# Patient Record
Sex: MALE | Race: WHITE | NOT HISPANIC OR LATINO | Employment: OTHER | URBAN - METROPOLITAN AREA
[De-identification: names, ages, dates, MRNs, and addresses within clinical notes are randomized per-mention and may not be internally consistent; named-entity substitution may affect disease eponyms.]

---

## 2021-04-23 ENCOUNTER — OFFICE VISIT (OUTPATIENT)
Dept: ORTHOPEDIC SURGERY | Facility: CLINIC | Age: 71
End: 2021-04-23

## 2021-04-23 VITALS
HEART RATE: 69 BPM | SYSTOLIC BLOOD PRESSURE: 170 MMHG | HEIGHT: 67 IN | DIASTOLIC BLOOD PRESSURE: 81 MMHG | WEIGHT: 160 LBS | BODY MASS INDEX: 25.11 KG/M2

## 2021-04-23 DIAGNOSIS — M75.81 TENDINITIS OF RIGHT ROTATOR CUFF: ICD-10-CM

## 2021-04-23 DIAGNOSIS — M75.51 SUBACROMIAL BURSITIS OF RIGHT SHOULDER JOINT: ICD-10-CM

## 2021-04-23 DIAGNOSIS — M25.511 RIGHT SHOULDER PAIN, UNSPECIFIED CHRONICITY: Primary | ICD-10-CM

## 2021-04-23 PROCEDURE — 73030 X-RAY EXAM OF SHOULDER: CPT | Performed by: ORTHOPAEDIC SURGERY

## 2021-04-23 PROCEDURE — 99203 OFFICE O/P NEW LOW 30 MIN: CPT | Performed by: ORTHOPAEDIC SURGERY

## 2021-04-23 RX ORDER — MELOXICAM 7.5 MG/1
7.5 TABLET ORAL DAILY
Qty: 30 TABLET | Refills: 5 | Status: SHIPPED | OUTPATIENT
Start: 2021-04-23 | End: 2021-06-16

## 2021-04-23 RX ORDER — ASPIRIN 81 MG/1
81 TABLET ORAL DAILY
COMMUNITY

## 2021-04-23 RX ORDER — TAMSULOSIN HYDROCHLORIDE 0.4 MG/1
CAPSULE ORAL
COMMUNITY
End: 2021-04-23

## 2021-04-23 RX ORDER — GABAPENTIN 300 MG/1
300 CAPSULE ORAL
COMMUNITY

## 2021-04-23 RX ORDER — HYDROCODONE BITARTRATE AND ACETAMINOPHEN 10; 325 MG/1; MG/1
TABLET ORAL EVERY 8 HOURS SCHEDULED
COMMUNITY
End: 2021-05-21
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
## 2021-04-29 ENCOUNTER — TELEPHONE (OUTPATIENT)
Dept: ORTHOPEDIC SURGERY | Facility: CLINIC | Age: 71
End: 2021-04-29

## 2021-04-29 NOTE — TELEPHONE ENCOUNTER
Caller: PATIENT    Relationship to patient: SELF    Best call back number: [phone]      Type of visit: MRI OF RIGHT SHOULDER      Additional notes: PT. STATES THAT HE HAS BEEN WAITING SINCE LAST Friday TO HAVE MRI OF RIGHT SHOULDER SCHEDULED, BUT HAS NOT HEARD ANYTHING.   PLEASE CALL TO ADVISE.

## 2021-04-29 NOTE — TELEPHONE ENCOUNTER
Called patient. Would like to schedule at [facility]. Transferred him to scheduling dept.

## 2021-05-03 ENCOUNTER — TELEPHONE (OUTPATIENT)
Dept: ORTHOPEDIC SURGERY | Facility: CLINIC | Age: 71
End: 2021-05-03

## 2021-05-03 NOTE — TELEPHONE ENCOUNTER
Caller: SHARON YANCEY    Relationship: SELF    Best call back number: [phone]    What orders are you requesting (i.e. lab or imaging): RT SHOULDER MRI    In what timeframe would the patient need to come in: 5-7-21    Where will you receive your lab/imaging services: POSSIBLY [facility]    Additional notes: PATIENT WOULD LIKE A CALL BACK TO DISCUSS HAVING RT SHOULDER MRI APPT. SET -UP AT [facility], PATIENT STATED THAT HE CAN GET IN THERE ON Friday 5-7-21. PATIENT STATED THAT IF DR. HARDWICK PREFERRED HIM TO GO TO [facility] 5-18-21 THAT HE WOULD KEEP THE APPT THERE.    [facility]: [phone]

## 2021-05-18 ENCOUNTER — HOSPITAL ENCOUNTER (OUTPATIENT)
Dept: MRI IMAGING | Facility: HOSPITAL | Age: 71
Discharge: HOME | End: 2021-05-18
Admitting: ORTHOPAEDIC SURGERY

## 2021-05-18 DIAGNOSIS — M75.81 TENDINITIS OF RIGHT ROTATOR CUFF: ICD-10-CM

## 2021-05-18 DIAGNOSIS — M25.511 RIGHT SHOULDER PAIN, UNSPECIFIED CHRONICITY: ICD-10-CM

## 2021-05-18 DIAGNOSIS — M75.51 SUBACROMIAL BURSITIS OF RIGHT SHOULDER JOINT: ICD-10-CM

## 2021-05-18 PROCEDURE — 73221 MRI JOINT UPR EXTREM W/O DYE: CPT

## 2021-05-21 ENCOUNTER — OFFICE VISIT (OUTPATIENT)
Dept: ORTHOPEDIC SURGERY | Facility: CLINIC | Age: 71
End: 2021-05-21

## 2021-05-21 VITALS — BODY MASS INDEX: 25.11 KG/M2 | HEIGHT: 67 IN | WEIGHT: 160 LBS

## 2021-05-21 DIAGNOSIS — M25.511 RIGHT SHOULDER PAIN, UNSPECIFIED CHRONICITY: Primary | ICD-10-CM

## 2021-05-21 DIAGNOSIS — S46.012D TRAUMATIC COMPLETE TEAR OF LEFT ROTATOR CUFF, SUBSEQUENT ENCOUNTER: ICD-10-CM

## 2021-05-21 PROCEDURE — 99213 OFFICE O/P EST LOW 20 MIN: CPT | Performed by: ORTHOPAEDIC SURGERY

## 2021-05-21 RX ORDER — HYDROCODONE BITARTRATE AND ACETAMINOPHEN 10; 325 MG/1; MG/1
TABLET ORAL 2 TIMES DAILY
COMMUNITY
End: 2021-06-25 | Stop reason: HOSPADM

## 2021-05-21 NOTE — PROGRESS NOTES
Subjective: Right shoulder pain     Patient ID: Mason Angel is a 70 y.o. male.    Chief Complaint:    History of Present Illness patient returns with results of the MRI was images report are personally reviewed shows a complete tear of the supraspinatus tendon of the right shoulder.  Is been in physical therapy for over 6 months which is not helping which is not unexpected.  Persistent having significant pain with limitations.  Patient also today wants to discuss persistent pain he is having his left side as result of a stroke sustained 5 years ago       Social History     Occupational History   • Not on file   Tobacco Use   • Smoking status: Never Smoker   • Smokeless tobacco: Never Used   Vaping Use   • Vaping Use: Never used   Substance and Sexual Activity   • Alcohol use: Yes   • Drug use: Never   • Sexual activity: Defer      Review of Systems   Constitutional: Negative for chills, diaphoresis, fever and unexpected weight change.   HENT: Negative for hearing loss, nosebleeds, sore throat and tinnitus.    Eyes: Negative for pain and visual disturbance.   Respiratory: Negative for cough, shortness of breath and wheezing.    Cardiovascular: Negative for chest pain and palpitations.   Gastrointestinal: Negative for abdominal pain, diarrhea, nausea and vomiting.   Endocrine: Negative for cold intolerance, heat intolerance and polydipsia.   Genitourinary: Negative for difficulty urinating, dysuria and hematuria.   Musculoskeletal: Positive for arthralgias.   Skin: Negative for rash and wound.   Allergic/Immunologic: Negative for environmental allergies.   Neurological: Negative for dizziness, syncope and numbness.   Hematological: Does not bruise/bleed easily.   Psychiatric/Behavioral: Negative for dysphoric mood and sleep disturbance. The patient is not nervous/anxious.    All other systems reviewed and are negative.        No past medical history on file.  No past surgical history on file.  Family History    Problem Relation Age of Onset   • Cancer Mother          Objective:  There were no vitals filed for this visit.      05/21/21  0953   Weight: 72.6 kg (160 lb)     Body mass index is 25.05 kg/m².        Ortho Exam   He is alert oriented x3.  Exam of the right upper extremity there is no motor or sensory deficit but there is pain and weakness with abduction extension at 90 degrees.  Positive Speed test.  Deltoid function is probably 3 out of 5 secondary to the pain.    Assessment:        1. Right shoulder pain, unspecified chronicity    2. Traumatic complete tear of left rotator cuff, subsequent encounter           Plan: Reviewed the results of the MRI with the patient.  Will refer to Dr. Montaño for surgical consultation for repair.  Also discussed with the patient at length residual pain in the left lower extremity as result of his stroke and inquired about possible injections.  He is on Lortab and gabapentin per his primary care physician to control the neurogenic pain and at this point I am not sure any injection to be of any help.  He does have an AFO for his left foot which she states he helps but if he wears it for long periods of time it causes some discomfort.  I did recommend that he contact his primary care physician regarding the ingested possibly the gabapentin to control the pain.  If he like to see a ankle specialist he has seen Dr. Schaefer in the past and I refer him back to Dr. Schaefer but are not sure what if anything else can be done for the ankle.  In any event he will see Dr. Montaño for surgical consultation regarding his right rotator cuff tear.  Answered all            Work Status:    XIAO query complete.    Orders:  No orders of the defined types were placed in this encounter.      Medications:  No orders of the defined types were placed in this encounter.      Followup:  Return if symptoms worsen or fail to improve.          Dictated utilizing Dragon dictation

## 2021-05-25 ENCOUNTER — OFFICE VISIT (OUTPATIENT)
Dept: ORTHOPEDIC SURGERY | Facility: CLINIC | Age: 71
End: 2021-05-25

## 2021-05-25 VITALS — HEIGHT: 67 IN | WEIGHT: 160.05 LBS | BODY MASS INDEX: 25.12 KG/M2

## 2021-05-25 DIAGNOSIS — M75.21 BICEPS TENDINITIS OF RIGHT UPPER EXTREMITY: ICD-10-CM

## 2021-05-25 DIAGNOSIS — M75.121 COMPLETE TEAR OF RIGHT ROTATOR CUFF, UNSPECIFIED WHETHER TRAUMATIC: Primary | ICD-10-CM

## 2021-05-25 DIAGNOSIS — M75.41 SUBACROMIAL IMPINGEMENT OF RIGHT SHOULDER: ICD-10-CM

## 2021-05-25 PROCEDURE — 99214 OFFICE O/P EST MOD 30 MIN: CPT | Performed by: ORTHOPAEDIC SURGERY
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
## 2021-05-26 RX ORDER — MELOXICAM 7.5 MG/1
15 TABLET ORAL ONCE
Status: CANCELLED | OUTPATIENT
Start: 2021-05-26 | End: 2021-05-26

## 2021-05-26 RX ORDER — PREGABALIN 150 MG/1
150 CAPSULE ORAL ONCE
Status: CANCELLED | OUTPATIENT
Start: 2021-05-26 | End: 2021-05-26

## 2021-05-26 RX ORDER — ACETAMINOPHEN 325 MG/1
1000 TABLET ORAL ONCE
Status: CANCELLED | OUTPATIENT
Start: 2021-05-26 | End: 2021-05-26

## 2021-06-01 ENCOUNTER — TRANSCRIBE ORDERS (OUTPATIENT)
Dept: ADMINISTRATIVE | Facility: HOSPITAL | Age: 71
End: 2021-06-01

## 2021-06-01 DIAGNOSIS — U07.1 COVID-19: Primary | ICD-10-CM

## 2021-06-01 PROBLEM — M75.41 SUBACROMIAL IMPINGEMENT OF RIGHT SHOULDER: Status: ACTIVE | Noted: 2021-06-01

## 2021-06-16 ENCOUNTER — PRE-ADMISSION TESTING (OUTPATIENT)
Dept: PREADMISSION TESTING | Facility: HOSPITAL | Age: 71
End: 2021-06-16

## 2021-06-16 VITALS
HEIGHT: 67 IN | DIASTOLIC BLOOD PRESSURE: 76 MMHG | WEIGHT: 160 LBS | BODY MASS INDEX: 25.11 KG/M2 | OXYGEN SATURATION: 96 % | RESPIRATION RATE: 16 BRPM | SYSTOLIC BLOOD PRESSURE: 150 MMHG | HEART RATE: 71 BPM

## 2021-06-16 DIAGNOSIS — M75.21 BICEPS TENDINITIS OF RIGHT UPPER EXTREMITY: ICD-10-CM

## 2021-06-16 DIAGNOSIS — M75.121 COMPLETE TEAR OF RIGHT ROTATOR CUFF, UNSPECIFIED WHETHER TRAUMATIC: ICD-10-CM

## 2021-06-16 DIAGNOSIS — M75.41 SUBACROMIAL IMPINGEMENT OF RIGHT SHOULDER: ICD-10-CM

## 2021-06-16 LAB
ANION GAP SERPL CALCULATED.3IONS-SCNC: 10.5 MMOL/L (ref 5–15)
APTT PPP: 25.8 SECONDS (ref 24.3–38.1)
BASOPHILS # BLD AUTO: 0.03 10*3/MM3 (ref 0–0.2)
BASOPHILS NFR BLD AUTO: 0.5 % (ref 0–1.5)
BUN SERPL-MCNC: 11 MG/DL (ref 8–23)
BUN/CREAT SERPL: 14.9 (ref 7–25)
CALCIUM SPEC-SCNC: 9.1 MG/DL (ref 8.6–10.5)
CHLORIDE SERPL-SCNC: 104 MMOL/L (ref 98–107)
CO2 SERPL-SCNC: 24.5 MMOL/L (ref 22–29)
CREAT SERPL-MCNC: 0.74 MG/DL (ref 0.76–1.27)
DEPRECATED RDW RBC AUTO: 46.9 FL (ref 37–54)
EOSINOPHIL # BLD AUTO: 0.21 10*3/MM3 (ref 0–0.4)
EOSINOPHIL NFR BLD AUTO: 3.2 % (ref 0.3–6.2)
ERYTHROCYTE [DISTWIDTH] IN BLOOD BY AUTOMATED COUNT: 12.3 % (ref 12.3–15.4)
GFR SERPL CREATININE-BSD FRML MDRD: 105 ML/MIN/1.73
GLUCOSE SERPL-MCNC: 115 MG/DL (ref 65–99)
HCT VFR BLD AUTO: 50.6 % (ref 37.5–51)
HGB BLD-MCNC: 16.5 G/DL (ref 13–17.7)
IMM GRANULOCYTES # BLD AUTO: 0.02 10*3/MM3 (ref 0–0.05)
IMM GRANULOCYTES NFR BLD AUTO: 0.3 % (ref 0–0.5)
INR PPP: 1.03 (ref 0.9–1.1)
LYMPHOCYTES # BLD AUTO: 1.27 10*3/MM3 (ref 0.7–3.1)
LYMPHOCYTES NFR BLD AUTO: 19.6 % (ref 19.6–45.3)
MCH RBC QN AUTO: 33.2 PG (ref 26.6–33)
MCHC RBC AUTO-ENTMCNC: 32.6 G/DL (ref 31.5–35.7)
MCV RBC AUTO: 101.8 FL (ref 79–97)
MONOCYTES # BLD AUTO: 0.58 10*3/MM3 (ref 0.1–0.9)
MONOCYTES NFR BLD AUTO: 9 % (ref 5–12)
NEUTROPHILS NFR BLD AUTO: 4.36 10*3/MM3 (ref 1.7–7)
NEUTROPHILS NFR BLD AUTO: 67.4 % (ref 42.7–76)
PLATELET # BLD AUTO: 198 10*3/MM3 (ref 140–450)
PMV BLD AUTO: 10 FL (ref 6–12)
POTASSIUM SERPL-SCNC: 4 MMOL/L (ref 3.5–5.2)
PROTHROMBIN TIME: 13.5 SECONDS (ref 12.1–15)
RBC # BLD AUTO: 4.97 10*6/MM3 (ref 4.14–5.8)
SODIUM SERPL-SCNC: 139 MMOL/L (ref 136–145)
WBC # BLD AUTO: 6.47 10*3/MM3 (ref 3.4–10.8)

## 2021-06-16 PROCEDURE — 85610 PROTHROMBIN TIME: CPT | Performed by: ORTHOPAEDIC SURGERY

## 2021-06-16 PROCEDURE — 85730 THROMBOPLASTIN TIME PARTIAL: CPT | Performed by: ORTHOPAEDIC SURGERY

## 2021-06-16 PROCEDURE — 93005 ELECTROCARDIOGRAM TRACING: CPT

## 2021-06-16 PROCEDURE — 85025 COMPLETE CBC W/AUTO DIFF WBC: CPT | Performed by: ORTHOPAEDIC SURGERY

## 2021-06-16 PROCEDURE — 93010 ELECTROCARDIOGRAM REPORT: CPT | Performed by: INTERNAL MEDICINE

## 2021-06-16 PROCEDURE — 36415 COLL VENOUS BLD VENIPUNCTURE: CPT

## 2021-06-16 PROCEDURE — 80048 BASIC METABOLIC PNL TOTAL CA: CPT | Performed by: ORTHOPAEDIC SURGERY

## 2021-06-16 RX ORDER — FEXOFENADINE HCL 180 MG/1
180 TABLET ORAL DAILY PRN
COMMUNITY

## 2021-06-16 RX ORDER — ATORVASTATIN CALCIUM 40 MG/1
40 TABLET, FILM COATED ORAL NIGHTLY
COMMUNITY

## 2021-06-16 NOTE — DISCHARGE INSTRUCTIONS
PRE-ADMISSION TESTING INSTRUCTIONS FOR ADULTS    Take these medications the morning of surgery with a small sip of water: nothing       No aspirin, advil, aleve, ibuprofen, naproxen, diet pills, decongestants, or herbal/vitamins for a week prior to surgery.    General Instructions:    • Do not eat solid food after midnight the night before surgery.  No gum, mints, or hard candy after midnight the night before surgery.  • You may drink clear liquids the day of surgery up until 2 hours before your arrival time.  • Clear liquids are liquids you can see through. Nothing RED in color.    Plain water    Sports drinks  Sodas     Gelatin (Jell-O)  Fruit juices without pulp such as white grape juice and apple juice  Popsicles that contain no fruit or yogurt  Tea or coffee (no cream or milk added)    • It is beneficial for you to have a clear drink that contains carbohydrates just before you leave your house and before your fasting time begins.  We suggest a 20 ounce bottle of Gatorade or Powerade for non-diabetic patients or a 20 ounce bottle of G2 or Powerade Zero for diabetic patients.     • Patients who avoid smoking, chewing tobacco and alcohol for 4 weeks prior to surgery have a reduced risk of post-operative complications.  If at all possible, quit smoking as many days before surgery as you can.    • Do not smoke, use chewing tobacco or drink alcohol the day of surgery    • Bring your C-PAP/ BI-PAP machine if you use one.  • Wear clean comfortable clothes and socks.  • Do not wear contact lenses, lotion, deodorant, or make-up.  Bring a case for your glasses if applicable. You may brush your teeth the morning of surgery.  • You may wear dentures/partials, do not put adhesive/glue on them.  • Bring crutches or walker if applicable.  • Leave all other jewelry and valuables at home.      Preventing a Surgical Site Infection:    • Shower the night before and on the morning of surgery using the chlorhexidine soap you were  given.  Use a clean washcloth with the soap.  Place clean sheets on your bed after showering the night before surgery. Do not use the CHG soap on your hair, face, or private areas. Wash your body gently for five (5) minutes. Do not scrub your skin.  Dry with a clean towel and dress in clean clothing.    • Do not shave the surgical area for 10 days-2 weeks prior to surgery  because the razor can irritate skin and make it easier to develop an infection.  • Make sure you, your family, and all healthcare providers clean their hands with soap and water or an alcohol based hand  before caring for you or your wound.      Day of surgery:    Your surgeon’s office will advise you of your arrival time for the day of surgery.    Upon arrival, a Pre-op nurse and Anesthesia provider will review your health history, obtain vital signs, and answer questions you may have.  The only belongings needed at this time will be your home medications and if applicable your C-PAP/BI-PAP machine.  If you are staying overnight your family can leave the rest of your belongings in the car and bring them to your room later.  A Pre-op nurse will start an IV and you may receive medication in preparation for surgery, including something to help you relax.  Your family will be able to see you in the Pre-op area.  While you are in surgery your family should notify the waiting room  if they leave the waiting room area and provide a contact phone number.    IF you have any questions, you can call the Pre-Admission Department at [phone] or your surgeon's office.  Notify your surgeon if  you become sick, have a fever, productive cough, or cannot be here the day of surgery    Please be aware that surgery does come with discomfort.  We want to make every effort to control your discomfort so please discuss any uncontrolled symptoms with your nurse.   Your doctor will most likely have prescribed pain medications.      If you are  going home after surgery, you will receive individualized written care instructions before being discharged.  A responsible adult (over the age of 18) must drive you to and from the hospital on the day of your surgery and stay with you for 24 hours after anesthesia.    If you are staying overnight following surgery, you will be transported to your hospital room following the recovery period.  [facility] has all private rooms.    You may receive a survey regarding the care you received. Your feedback is very important and will be used to collect the necessary data to help us to continue to provide excellent care.     Deductibles and co-payments are collected on the day of service. Please be prepared to pay the required co-pay, deductible or deposit on the day of service as defined by your plan.

## 2021-06-16 NOTE — PAT
Pt here for PAT visit.  Pre-op tests completed, chg soap given, and instructions reviewed.  Instructed clears until 2 hrs prior to arrival time, voiced understanding. Covid 6/23. Medical Clearance in Care Everywhere- Dr Sarkar. EDGAR instructions and handouts reviewed with pt

## 2021-06-17 ENCOUNTER — PREP FOR SURGERY (OUTPATIENT)
Dept: OTHER | Facility: HOSPITAL | Age: 71
End: 2021-06-17

## 2021-06-17 DIAGNOSIS — M75.121 COMPLETE TEAR OF RIGHT ROTATOR CUFF, UNSPECIFIED WHETHER TRAUMATIC: Primary | ICD-10-CM

## 2021-06-17 LAB — QT INTERVAL: 362 MS

## 2021-06-17 RX ORDER — CEFAZOLIN SODIUM 2 G/50ML
2 SOLUTION INTRAVENOUS ONCE
Status: CANCELLED | OUTPATIENT
Start: 2021-06-25

## 2021-06-23 ENCOUNTER — LAB (OUTPATIENT)
Dept: LAB | Facility: HOSPITAL | Age: 71
End: 2021-06-23

## 2021-06-23 DIAGNOSIS — U07.1 COVID-19: ICD-10-CM

## 2021-06-23 LAB — SARS-COV-2 RNA PNL SPEC NAA+PROBE: NOT DETECTED

## 2021-06-23 PROCEDURE — C9803 HOPD COVID-19 SPEC COLLECT: HCPCS

## 2021-06-23 PROCEDURE — 87635 SARS-COV-2 COVID-19 AMP PRB: CPT | Performed by: OBSTETRICS & GYNECOLOGY

## 2021-06-24 ENCOUNTER — ANESTHESIA EVENT (OUTPATIENT)
Dept: PERIOP | Facility: HOSPITAL | Age: 71
End: 2021-06-24

## 2021-06-25 ENCOUNTER — HOSPITAL ENCOUNTER (OUTPATIENT)
Facility: HOSPITAL | Age: 71
Setting detail: HOSPITAL OUTPATIENT SURGERY
Discharge: HOME | End: 2021-06-25
Attending: ORTHOPAEDIC SURGERY | Admitting: ORTHOPAEDIC SURGERY

## 2021-06-25 ENCOUNTER — ANESTHESIA (OUTPATIENT)
Dept: PERIOP | Facility: HOSPITAL | Age: 71
End: 2021-06-25

## 2021-06-25 VITALS
SYSTOLIC BLOOD PRESSURE: 126 MMHG | OXYGEN SATURATION: 99 % | TEMPERATURE: 97.3 F | HEIGHT: 67 IN | BODY MASS INDEX: 25.21 KG/M2 | DIASTOLIC BLOOD PRESSURE: 75 MMHG | RESPIRATION RATE: 15 BRPM | WEIGHT: 160.6 LBS | HEART RATE: 83 BPM

## 2021-06-25 DIAGNOSIS — M75.41 SUBACROMIAL IMPINGEMENT OF RIGHT SHOULDER: ICD-10-CM

## 2021-06-25 DIAGNOSIS — M75.21 BICEPS TENDINITIS OF RIGHT UPPER EXTREMITY: ICD-10-CM

## 2021-06-25 DIAGNOSIS — M75.121 COMPLETE TEAR OF RIGHT ROTATOR CUFF, UNSPECIFIED WHETHER TRAUMATIC: ICD-10-CM

## 2021-06-25 PROCEDURE — 23430 REPAIR BICEPS TENDON: CPT | Performed by: SPECIALIST/TECHNOLOGIST, OTHER

## 2021-06-25 PROCEDURE — 25010000002 PHENYLEPHRINE PER 1 ML: Performed by: NURSE ANESTHETIST, CERTIFIED REGISTERED

## 2021-06-25 PROCEDURE — C1713 ANCHOR/SCREW BN/BN,TIS/BN: HCPCS | Performed by: ORTHOPAEDIC SURGERY

## 2021-06-25 PROCEDURE — 29827 SHO ARTHRS SRG RT8TR CUF RPR: CPT | Performed by: ORTHOPAEDIC SURGERY

## 2021-06-25 PROCEDURE — 25010000002 DEXAMETHASONE PER 1 MG: Performed by: NURSE ANESTHETIST, CERTIFIED REGISTERED

## 2021-06-25 PROCEDURE — C1889 IMPLANT/INSERT DEVICE, NOC: HCPCS | Performed by: ORTHOPAEDIC SURGERY

## 2021-06-25 PROCEDURE — 25010000002 SUCCINYLCHOLINE PER 20 MG: Performed by: NURSE ANESTHETIST, CERTIFIED REGISTERED

## 2021-06-25 PROCEDURE — 23430 REPAIR BICEPS TENDON: CPT | Performed by: ORTHOPAEDIC SURGERY

## 2021-06-25 PROCEDURE — 25010000003 CEFAZOLIN SODIUM-DEXTROSE 2-3 GM-%(50ML) RECONSTITUTED SOLUTION: Performed by: ORTHOPAEDIC SURGERY

## 2021-06-25 PROCEDURE — 25010000002 PROPOFOL 10 MG/ML EMULSION: Performed by: NURSE ANESTHETIST, CERTIFIED REGISTERED

## 2021-06-25 PROCEDURE — 25010000002 ONDANSETRON PER 1 MG: Performed by: NURSE ANESTHETIST, CERTIFIED REGISTERED

## 2021-06-25 PROCEDURE — 29827 SHO ARTHRS SRG RT8TR CUF RPR: CPT | Performed by: SPECIALIST/TECHNOLOGIST, OTHER

## 2021-06-25 PROCEDURE — 25010000002 FENTANYL CITRATE (PF) 50 MCG/ML SOLUTION: Performed by: NURSE ANESTHETIST, CERTIFIED REGISTERED

## 2021-06-25 PROCEDURE — 25010000002 EPINEPHRINE PER 0.1 MG: Performed by: ORTHOPAEDIC SURGERY

## 2021-06-25 PROCEDURE — 76942 ECHO GUIDE FOR BIOPSY: CPT | Performed by: ORTHOPAEDIC SURGERY

## 2021-06-25 PROCEDURE — C1763 CONN TISS, NON-HUMAN: HCPCS | Performed by: ORTHOPAEDIC SURGERY

## 2021-06-25 DEVICE — BONE ANCHORS 3 WITH ARTHROSCOPIC DELIVERY SYSTEM ADVANCED
Type: IMPLANTABLE DEVICE | Site: SHOULDER | Status: FUNCTIONAL
Brand: BONE ANCHORS WITH ARTHROSCOPIC DELIVERY SYSTEM - ADVANCED

## 2021-06-25 DEVICE — 2.8MM Q-FIX ALL SUTURE ANCHOR
Type: IMPLANTABLE DEVICE | Site: SHOULDER | Status: FUNCTIONAL
Brand: Q-FIX

## 2021-06-25 DEVICE — IMPLANTABLE DEVICE
Type: IMPLANTABLE DEVICE | Site: SHOULDER | Status: FUNCTIONAL
Brand: BIOINDUCTIVE IMPLANT WITH ARTHROSCOPIC DELIVERY SYSTEM - MEDIUM

## 2021-06-25 DEVICE — HEALICOIL PK 5.5 MM SUTURE ANCHOR                                    WITH THREE ULTRABRAID NO.2 SUTURES                                    BLUE, BLUE-COBRAID, COBRAID-BLACK STERILE
Type: IMPLANTABLE DEVICE | Site: SHOULDER | Status: FUNCTIONAL
Brand: HEALICOIL

## 2021-06-25 DEVICE — ANCHR TNDN REGENETEN TISS/SFT EA/8: Type: IMPLANTABLE DEVICE | Site: SHOULDER | Status: FUNCTIONAL

## 2021-06-25 DEVICE — HEALICOIL KNOTLESS PK  NST
Type: IMPLANTABLE DEVICE | Site: SHOULDER | Status: FUNCTIONAL
Brand: HEALICOIL

## 2021-06-25 RX ORDER — FAMOTIDINE 10 MG/ML
20 INJECTION, SOLUTION INTRAVENOUS
Status: COMPLETED | OUTPATIENT
Start: 2021-06-25 | End: 2021-06-25

## 2021-06-25 RX ORDER — PROPOFOL 10 MG/ML
VIAL (ML) INTRAVENOUS AS NEEDED
Status: DISCONTINUED | OUTPATIENT
Start: 2021-06-25 | End: 2021-06-25 | Stop reason: SURG

## 2021-06-25 RX ORDER — MELOXICAM 7.5 MG/1
15 TABLET ORAL ONCE
Status: COMPLETED | OUTPATIENT
Start: 2021-06-25 | End: 2021-06-25

## 2021-06-25 RX ORDER — LIDOCAINE HYDROCHLORIDE 10 MG/ML
0.5 INJECTION, SOLUTION EPIDURAL; INFILTRATION; INTRACAUDAL; PERINEURAL ONCE AS NEEDED
Status: DISCONTINUED | OUTPATIENT
Start: 2021-06-25 | End: 2021-06-25 | Stop reason: HOSPADM

## 2021-06-25 RX ORDER — FENTANYL CITRATE 50 UG/ML
25 INJECTION, SOLUTION INTRAMUSCULAR; INTRAVENOUS
Status: DISCONTINUED | OUTPATIENT
Start: 2021-06-25 | End: 2021-06-25 | Stop reason: HOSPADM

## 2021-06-25 RX ORDER — SODIUM CHLORIDE 0.9 % (FLUSH) 0.9 %
10 SYRINGE (ML) INJECTION EVERY 12 HOURS SCHEDULED
Status: DISCONTINUED | OUTPATIENT
Start: 2021-06-25 | End: 2021-06-25 | Stop reason: HOSPADM

## 2021-06-25 RX ORDER — HYDROCODONE BITARTRATE AND ACETAMINOPHEN 7.5; 325 MG/1; MG/1
1 TABLET ORAL EVERY 4 HOURS PRN
Status: DISCONTINUED | OUTPATIENT
Start: 2021-06-25 | End: 2021-06-25 | Stop reason: HOSPADM

## 2021-06-25 RX ORDER — SENNA PLUS 8.6 MG/1
1 TABLET ORAL NIGHTLY
Qty: 20 TABLET | Refills: 0 | Status: SHIPPED | OUTPATIENT
Start: 2021-06-25 | End: 2021-09-30

## 2021-06-25 RX ORDER — SODIUM CHLORIDE, SODIUM LACTATE, POTASSIUM CHLORIDE, CALCIUM CHLORIDE 600; 310; 30; 20 MG/100ML; MG/100ML; MG/100ML; MG/100ML
9 INJECTION, SOLUTION INTRAVENOUS CONTINUOUS
Status: DISCONTINUED | OUTPATIENT
Start: 2021-06-25 | End: 2021-06-25 | Stop reason: HOSPADM

## 2021-06-25 RX ORDER — OXYCODONE HYDROCHLORIDE AND ACETAMINOPHEN 5; 325 MG/1; MG/1
1 TABLET ORAL EVERY 4 HOURS PRN
Qty: 42 TABLET | Refills: 0 | Status: SHIPPED | OUTPATIENT
Start: 2021-06-25 | End: 2021-07-02

## 2021-06-25 RX ORDER — SODIUM CHLORIDE 9 MG/ML
40 INJECTION, SOLUTION INTRAVENOUS AS NEEDED
Status: DISCONTINUED | OUTPATIENT
Start: 2021-06-25 | End: 2021-06-25 | Stop reason: HOSPADM

## 2021-06-25 RX ORDER — KETAMINE HYDROCHLORIDE 10 MG/ML
INJECTION INTRAMUSCULAR; INTRAVENOUS AS NEEDED
Status: DISCONTINUED | OUTPATIENT
Start: 2021-06-25 | End: 2021-06-25 | Stop reason: SURG

## 2021-06-25 RX ORDER — ONDANSETRON 2 MG/ML
4 INJECTION INTRAMUSCULAR; INTRAVENOUS ONCE AS NEEDED
Status: COMPLETED | OUTPATIENT
Start: 2021-06-25 | End: 2021-06-25

## 2021-06-25 RX ORDER — DEXAMETHASONE SODIUM PHOSPHATE 4 MG/ML
8 INJECTION, SOLUTION INTRA-ARTICULAR; INTRALESIONAL; INTRAMUSCULAR; INTRAVENOUS; SOFT TISSUE ONCE AS NEEDED
Status: COMPLETED | OUTPATIENT
Start: 2021-06-25 | End: 2021-06-25

## 2021-06-25 RX ORDER — LIDOCAINE HYDROCHLORIDE AND EPINEPHRINE 10; 10 MG/ML; UG/ML
INJECTION, SOLUTION INFILTRATION; PERINEURAL AS NEEDED
Status: DISCONTINUED | OUTPATIENT
Start: 2021-06-25 | End: 2021-06-25 | Stop reason: HOSPADM

## 2021-06-25 RX ORDER — ACETAMINOPHEN 500 MG
1000 TABLET ORAL ONCE
Status: COMPLETED | OUTPATIENT
Start: 2021-06-25 | End: 2021-06-25

## 2021-06-25 RX ORDER — BUPIVACAINE HYDROCHLORIDE 5 MG/ML
INJECTION, SOLUTION EPIDURAL; INTRACAUDAL
Status: COMPLETED | OUTPATIENT
Start: 2021-06-25 | End: 2021-06-25

## 2021-06-25 RX ORDER — CEFAZOLIN SODIUM 2 G/50ML
2 SOLUTION INTRAVENOUS ONCE
Status: COMPLETED | OUTPATIENT
Start: 2021-06-25 | End: 2021-06-25

## 2021-06-25 RX ORDER — ONDANSETRON 2 MG/ML
4 INJECTION INTRAMUSCULAR; INTRAVENOUS ONCE AS NEEDED
Status: DISCONTINUED | OUTPATIENT
Start: 2021-06-25 | End: 2021-06-25 | Stop reason: HOSPADM

## 2021-06-25 RX ORDER — ONDANSETRON 4 MG/1
4 TABLET, FILM COATED ORAL EVERY 8 HOURS PRN
Qty: 30 TABLET | Refills: 0 | Status: SHIPPED | OUTPATIENT
Start: 2021-06-25 | End: 2021-09-30

## 2021-06-25 RX ORDER — SODIUM CHLORIDE 0.9 % (FLUSH) 0.9 %
10 SYRINGE (ML) INJECTION AS NEEDED
Status: DISCONTINUED | OUTPATIENT
Start: 2021-06-25 | End: 2021-06-25 | Stop reason: HOSPADM

## 2021-06-25 RX ORDER — SODIUM CHLORIDE, SODIUM LACTATE, POTASSIUM CHLORIDE, CALCIUM CHLORIDE 600; 310; 30; 20 MG/100ML; MG/100ML; MG/100ML; MG/100ML
100 INJECTION, SOLUTION INTRAVENOUS CONTINUOUS
Status: DISCONTINUED | OUTPATIENT
Start: 2021-06-25 | End: 2021-06-25 | Stop reason: HOSPADM

## 2021-06-25 RX ORDER — MIDAZOLAM HYDROCHLORIDE 2 MG/2ML
0.5 INJECTION, SOLUTION INTRAMUSCULAR; INTRAVENOUS
Status: DISCONTINUED | OUTPATIENT
Start: 2021-06-25 | End: 2021-06-25 | Stop reason: HOSPADM

## 2021-06-25 RX ORDER — SUCCINYLCHOLINE CHLORIDE 20 MG/ML
INJECTION INTRAMUSCULAR; INTRAVENOUS AS NEEDED
Status: DISCONTINUED | OUTPATIENT
Start: 2021-06-25 | End: 2021-06-25 | Stop reason: SURG

## 2021-06-25 RX ORDER — EPHEDRINE SULFATE 50 MG/ML
INJECTION, SOLUTION INTRAVENOUS AS NEEDED
Status: DISCONTINUED | OUTPATIENT
Start: 2021-06-25 | End: 2021-06-25 | Stop reason: SURG

## 2021-06-25 RX ORDER — FENTANYL CITRATE 50 UG/ML
INJECTION, SOLUTION INTRAMUSCULAR; INTRAVENOUS AS NEEDED
Status: DISCONTINUED | OUTPATIENT
Start: 2021-06-25 | End: 2021-06-25 | Stop reason: SURG

## 2021-06-25 RX ORDER — DEXMEDETOMIDINE HYDROCHLORIDE 100 UG/ML
INJECTION, SOLUTION INTRAVENOUS AS NEEDED
Status: DISCONTINUED | OUTPATIENT
Start: 2021-06-25 | End: 2021-06-25 | Stop reason: SURG

## 2021-06-25 RX ORDER — HYDROCODONE BITARTRATE AND ACETAMINOPHEN 5; 325 MG/1; MG/1
1 TABLET ORAL ONCE AS NEEDED
Status: DISCONTINUED | OUTPATIENT
Start: 2021-06-25 | End: 2021-06-25 | Stop reason: HOSPADM

## 2021-06-25 RX ORDER — PREGABALIN 75 MG/1
150 CAPSULE ORAL ONCE
Status: COMPLETED | OUTPATIENT
Start: 2021-06-25 | End: 2021-06-25

## 2021-06-25 RX ADMIN — DEXAMETHASONE SODIUM PHOSPHATE 8 MG: 4 INJECTION, SOLUTION INTRAMUSCULAR; INTRAVENOUS at 09:44

## 2021-06-25 RX ADMIN — SUCCINYLCHOLINE CHLORIDE 100 MG: 20 INJECTION, SOLUTION INTRAMUSCULAR; INTRAVENOUS at 11:06

## 2021-06-25 RX ADMIN — KETAMINE HYDROCHLORIDE 10 MG: 10 INJECTION, SOLUTION INTRAMUSCULAR; INTRAVENOUS at 11:45

## 2021-06-25 RX ADMIN — EPHEDRINE SULFATE 5 MG: 50 INJECTION, SOLUTION INTRAVENOUS at 11:36

## 2021-06-25 RX ADMIN — BUPIVACAINE HYDROCHLORIDE 20 ML: 5 INJECTION, SOLUTION EPIDURAL; INTRACAUDAL; PERINEURAL at 10:20

## 2021-06-25 RX ADMIN — SODIUM CHLORIDE, POTASSIUM CHLORIDE, SODIUM LACTATE AND CALCIUM CHLORIDE 9 ML/HR: 600; 310; 30; 20 INJECTION, SOLUTION INTRAVENOUS at 07:44

## 2021-06-25 RX ADMIN — ONDANSETRON 4 MG: 2 INJECTION INTRAMUSCULAR; INTRAVENOUS at 09:44

## 2021-06-25 RX ADMIN — EPHEDRINE SULFATE 5 MG: 50 INJECTION, SOLUTION INTRAVENOUS at 11:17

## 2021-06-25 RX ADMIN — PHENYLEPHRINE HYDROCHLORIDE 50 MCG: 10 INJECTION, SOLUTION INTRAMUSCULAR; INTRAVENOUS; SUBCUTANEOUS at 11:46

## 2021-06-25 RX ADMIN — EPHEDRINE SULFATE 5 MG: 50 INJECTION, SOLUTION INTRAVENOUS at 11:46

## 2021-06-25 RX ADMIN — FAMOTIDINE 20 MG: 10 INJECTION INTRAVENOUS at 09:44

## 2021-06-25 RX ADMIN — PROPOFOL 150 MG: 10 INJECTION, EMULSION INTRAVENOUS at 11:05

## 2021-06-25 RX ADMIN — MELOXICAM 15 MG: 7.5 TABLET ORAL at 07:42

## 2021-06-25 RX ADMIN — PROPOFOL 50 MG: 10 INJECTION, EMULSION INTRAVENOUS at 11:06

## 2021-06-25 RX ADMIN — KETAMINE HYDROCHLORIDE 10 MG: 10 INJECTION, SOLUTION INTRAMUSCULAR; INTRAVENOUS at 12:06

## 2021-06-25 RX ADMIN — DEXMEDETOMIDINE 30 MCG: 100 INJECTION, SOLUTION, CONCENTRATE INTRAVENOUS at 10:20

## 2021-06-25 RX ADMIN — KETAMINE HYDROCHLORIDE 10 MG: 10 INJECTION, SOLUTION INTRAMUSCULAR; INTRAVENOUS at 11:28

## 2021-06-25 RX ADMIN — ACETAMINOPHEN 1000 MG: 500 TABLET, FILM COATED ORAL at 07:41

## 2021-06-25 RX ADMIN — PREGABALIN 150 MG: 75 CAPSULE ORAL at 07:42

## 2021-06-25 RX ADMIN — FENTANYL CITRATE 50 MCG: 50 INJECTION INTRAMUSCULAR; INTRAVENOUS at 11:04

## 2021-06-25 RX ADMIN — CEFAZOLIN SODIUM 2 G: 2 SOLUTION INTRAVENOUS at 11:17

## 2021-06-25 NOTE — ANESTHESIA PROCEDURE NOTES
Peripheral Block      Patient reassessed immediately prior to procedure    Patient location during procedure: pre-op  Start time: 6/25/2021 10:15 AM  Stop time: 6/25/2021 10:20 AM  Reason for block: at surgeon's request and post-op pain management  Performed by  CRNA: Sonja Bear CRNA  Preanesthetic Checklist  Completed: patient identified, IV checked, site marked, risks and benefits discussed, surgical consent, monitors and equipment checked, pre-op evaluation and timeout performed  Prep:  Pt Position: supine  Sterile barriers:cap, gloves, mask and washed/disinfected hands  Prep: ChloraPrep  Patient monitoring: blood pressure monitoring, continuous pulse oximetry and EKG  Procedure  Sedation:yes  Performed under: local infiltration  Guidance:ultrasound guided  ULTRASOUND INTERPRETATION. Using ultrasound guidance a 21 G gauge needle was placed in close proximity to the nerve, at which point, under ultrasound guidance anesthetic was injected in the area of the nerve and spread of the anesthesia was seen on ultrasound in close proximity thereto.  There were no abnormalities seen on ultrasound; a digital image was taken; and the patient tolerated the procedure with no complications. Images:still images obtained, printed/placed on chart  Loss of twitch: 0.5 mA  Laterality:right  Block Type:interscalene  Injection Technique:single-shot  Needle Type:echogenic  Needle Gauge:21 G  Resistance on Injection: none    Medications Used: bupivacaine PF (MARCAINE) injection 0.5%, 20 mL  Med admintered at 6/25/2021 10:20 AM      Medications  Comment:Lido for skin infiltration  30mcg precedex    Post Assessment  Injection Assessment: negative aspiration for heme, no paresthesia on injection and incremental injection  Patient Tolerance:comfortable throughout block  Complications:no

## 2021-06-25 NOTE — OP NOTE
Date of Operation:  6/25/2021     PREOPERATIVE DIAGNOSIS:  1. Right shoulder rotator cuff tear  2. Right shoulder biceps tendinopathy  3. Right shoulder subacromial bursitis    POSTOPERATIVE DIAGNOSIS:    1. Right shoulder rotator cuff tear  2. Right shoulder biceps tendinopathy  3. Right shoulder subacromial bursitis    PROCEDURE PERFORMED:   1. Right shoulder arthroscopic rotator cuff repair with bio inductive implant  2. Right shoulder open biceps tenodesis     SURGEON: Shalom Montaño MD     ASSISTANT:  Matthew Whitlock CSA-irrigation, retraction, closure, placement of dressings, held and positioned camera     ANESTHESIA: General endotracheal anesthesia with regional block.       ESTIMATED BLOOD LOSS:  minimal     URINE OUTPUT: Not recorded.       FLUIDS: Per anesthesia.       COMPLICATIONS: None.       SPECIMENS: None.       DRAINS: None.      IMPLANTS:  [employer] 2.8 mm Q fix anchor x1 for biceps tenodesis, 5.5 mm Healicoil anchor x1 and 5.0 mm knotless Healicoil anchor x1 for double row rotator cuff repair, medium [employer] bio inductive implant     ARTHROSCOPIC FINDINGS:   1.  Glenoid-grade 1/2 central chondral wear  2.  Humeral head-grade 1/2 chondral wear posterior superior humeral head  3.  Labrum-moderate degenerative fraying of superior, anterior, and posterior labrum  4.  Biceps tendon-significant intra-articular fraying as well as tenosynovitis in the groove portion of the biceps  5.  Rotator cuff-full-thickness crescent-shaped tear supraspinatus with retraction to the middle third of the humeral head  6.  Axillary pouch-free loose bodies  7.  Subacromial space-moderately thickened subacromial bursa     INDICATIONS FOR PROCEDURE: Patient is a pleasant 70 y.o. who has had significant limitation and use of right shoulder as well as associated pain with failure of conservative treatments. I discussed treatment options available to the patient and patient wished to proceed with surgical treatment. I  explained details of the procedure, as well as the risks, benefits, and alternatives as documented on history and physical, and the patient had all questions answered prior to signing the operative consent form. No guarantees were given in regard to results of the surgery.       DESCRIPTION OF PROCEDURE: The patient was seen, evaluated, and cleared for surgery by anesthesia. Admitted in the preoperative holding area. The operative site was marked, consent was reviewed, history and physical was updated, and preoperative labs were reviewed. A regional block was then placed per anesthesia. The patient was then taken to the operating room and placed in a supine position on a beachchair table. After successful intubation per anesthesia, facemask was placed securing head at this point time with the neck in normal anatomic position.  Patient was then elevated up into a seated position with neck maintained in normal anatomic alignment. All bony prominences were well-padded and patient was secured to the table with a waist strap. The right  upper extremity was then sterilely prepped and draped in a standard fashion.       A formal timeout was completed, including confirmation of History and Physical, operative consent, surgical site, patient identification number, and preoperative antibiotic administration.       Attention was then turned to creation of posterior portal with a 11 blade followed by insertion of blunt trocar and cannula.  Scope was inserted at this point time.  Anterior portal was then made in the rotator interval with spinal needle using outside in technique.  Cannula was then inserted over a trocar and diagnostic arthroscopic exam was carried out at this point in time with findings as noted above.     Attention was then turned to debridement of glenohumeral joint space including debridement of the superior, anterior, posterior labral fraying with combination of hand-held shaver and ablation wand.        Attention was then turned to release of the biceps tendon. Arthroscopic scissor was inserted through the anterior portal and used to release the biceps tendon at the superior labrum. The superior labrum was then debrided to a smooth stable edge with no residual prominence noted with use of a hand-held shaver as well as ablation wand at this time.     Attention was then turned to open biceps tenodesis.  4 cm longitudinal incision was made centered over the inferior border of the pectoralis major through skin only with 15 blade at this point time.  Once subcutaneous dissection was carried out to the inferior border the pectoralis major was bluntly elevated proximal lateral and retracted at this time.  Biceps tendon was identified at this point time and retrieved with the right angle.  Q fix guide was placed in the bicipital groove of the proximalmost portion identified under direct visualization.  Drill was passed in unicortical fashion followed by insertion of Q fix anchor which was secured into position with good stability on tension across the suture strands.  Biceps tendon was then whipstitched with a running locking stitch, using one suture strand from each pair.  The second strand was passed in simple fashion to the biceps tendon.  The musculotendinous junction was reapproximated to the inferior border of the pectoralis major.  Excess biceps tendon was resected and tenodesis was then completed with the tendon being tied down with 6 alternating half hitches ensuring good loop and knot security of the repair site.  Sutures were cut short at this point time.  Wound was then thoroughly irrigated with normal saline.      Attention was then returned to the subacromial space where the scope was inserted through the posterior portal, cannula inserted through the anterior portal and subacromial space was evaluated at this point in time.  Debridement of subacromial bursitis was completed with shaver as well as ablation  angie at this point time    Attention was then turned to rotator cuff repair.  Shaver was used to debride the greater tuberosity insertion site create a bleeding bony bed to enhance biologic healing potential.  5.5 mm Healicoil anchor x1 was placed at the articular margin in the central portion of the tear site.  Sutures were then passed using a first pass device in horizontal mattress fashion.  Suture pairs were then tied sequentially with a sliding Doran knot followed by 4 alternating half hitches ensuring good loop and knot security.  Suture strands were then brought to a lateral row 5.0 mm knotless Healicoil anchor, tap was first inserted for a  hole.  Sutures were passed through the eyelet of the anchor which was then impacted into position followed by tensioning and final screw insertion.  Sutures were locked into position at this point time, insertion handle removed, and sutures were then cut short at the anchor site.  Repair was assessed at this point time under arthroscopic visualization and noted to be stable on full passive range of motion.    Given the poor quality rotator cuff tissue, we elected to proceed with bio inductive implant for supplemental fixation.  [employer]en medium sized bio inductive implant was then inserted with insertion device from the lateral portal and laid over top of the rotator cuff repair site.  Soft tissue staples were placed anterior, central, and posterior along the medial margin as well as along the anterior central margin to secure the graft.  Insertion device was removed at this point time and bone staples were placed x 2 laterally.  2 additional soft tissue staples were used to secure the anterior and posterior margins of the graft to the underlying cuff tissue.  Graft was noted to be stable on arthroscopic exam with motion and acceptable position of the graft.    Fluid was evacuated with suction and arthroscopic instruments were removed at this point time.      Attention was then turned to closure the wounds with biceps tenodesis site being closed with 3-0 Vicryl for subcutaneous closure in inverted fashion followed by skin closure with 3-0 Monocryl and Steri-Strips.  Portal sites were closed with 3-0 nylon in interrupted fashion.  Wounds were dressed with Xeroform gauze, 4 x 4, Tegaderm, ABD pad, Medipore tape and patient was placed in a sling with abduction pillow to the left side.     At the end of the procedure, all lap, needle, and sponge counts were correct x2. The patient had brisk capillary refill to all digits of the left upper extremity. Compartments were soft and easily compressible at the end of the procedure.       DISPOSITION: The patient was extubated per anesthesia and taken to the recovery room in stable condition. Will follow up in office in 1 week for wound check. Results discussed immediately after procedure with family and all questions were answered at that time.       REHAB:  Patient will be placed on standard rotator cuff repair protocol, sling x4 weeks, start physical therapy at week 3.

## 2021-06-25 NOTE — BRIEF OP NOTE
SHOULDER ARTHROSCOPY WITH ROTATOR CUFF REPAIR, BICEPS TENDONESIS SUBPECTORALIS MINI OPEN REPAIR  Progress Note    Mason Angel  6/25/2021    Pre-op Diagnosis:   Complete tear of right rotator cuff, unspecified whether traumatic [M75.121]  Biceps tendinitis of right upper extremity [M75.21]  Subacromial impingement of right shoulder [M75.41]       Post-Op Diagnosis Codes:     * Complete tear of right rotator cuff, unspecified whether traumatic [M75.121]     * Biceps tendinitis of right upper extremity [M75.21]     * Subacromial impingement of right shoulder [M75.41]    Procedure/CPT® Codes:        Procedure(s):  RIGHT SHOULDER ARTHROSCOPY WITH ROTATOR CUFF REPAIR  BICEPS TENDONESIS     Surgeon(s):  Shalom Montaño MD    Anesthesia: General with Block    Staff:   Circulator: Sonja Jensen RN; Gely Garcia RN  Scrub Person: Rosa Davison  Assistant: Matthew Rose CSA  Assistant: Matthew Rose CSA      Estimated Blood Loss: minimal    Urine Voided: * No values recorded between 6/25/2021 10:54 AM and 6/25/2021  1:08 PM *    Specimens:                None          Drains: * No LDAs found *    Findings: see op report    Complications: none    Assistant: Matthew Rose CSA  was responsible for performing the following activities: Retraction, irrigation, held and positioned camera, closure, placement of dressing and their skilled assistance was necessary for the success of this case.    Shalom Montaño MD     Date: 6/25/2021  Time: 14:18 EDT

## 2021-06-25 NOTE — ANESTHESIA POSTPROCEDURE EVALUATION
Patient: Mason Angel    Procedure Summary     Date: 06/25/21 Room / Location:  [facility] /  [facility]    Anesthesia Start: 1054 Anesthesia Stop: 1311    Procedures:       SHOULDER ARTHROSCOPY WITH ROTATOR CUFF REPAIR (Right Shoulder)      BICEPS TENDONESIS SUBPECTORALIS MINI OPEN REPAIR (Right Arm Upper) Diagnosis:       Complete tear of right rotator cuff, unspecified whether traumatic      Biceps tendinitis of right upper extremity      Subacromial impingement of right shoulder      (Complete tear of right rotator cuff, unspecified whether traumatic [M75.121])      (Biceps tendinitis of right upper extremity [M75.21])      (Subacromial impingement of right shoulder [M75.41])    Surgeons: Shalom Montaño MD Provider: Sonja Bear CRNA    Anesthesia Type: general with block ASA Status: 2          Anesthesia Type: general with block    Vitals  Vitals Value Taken Time   /72 06/25/21 1344   Temp 97.3 °F (36.3 °C) 06/25/21 1315   Pulse 89 06/25/21 1345   Resp 18 06/25/21 1344   SpO2 92 % 06/25/21 1345   Vitals shown include unvalidated device data.        Post Anesthesia Care and Evaluation    Patient location during evaluation: PHASE II  Patient participation: complete - patient participated  Level of consciousness: awake  Pain score: 0  Pain management: adequate  Airway patency: patent  Anesthetic complications: No anesthetic complications  PONV Status: none  Cardiovascular status: acceptable  Respiratory status: acceptable  Hydration status: acceptable

## 2021-06-25 NOTE — ANESTHESIA PREPROCEDURE EVALUATION
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 No longer follows with neurology ) residual symptoms,     GI/Hepatic/Renal/Endo    (+)  GERD well controlled,      Musculoskeletal     (+) chronic pain (left foot and ankle. can fall because ankle will buckle.),   Abdominal    Substance History - negative use     OB/GYN negative ob/gyn ROS         Other - negative ROS       ROS/Med Hx Other: 0600 gatorade  0530 1c black coffee    ECG 12 Lead  Order: [id number]  Status:  Final result   Visible to patient:  No (not released) Next appt:  07/02/2021 at 09:15 AM in Orthopedic Surgery ([facility])  Component   Ref Range & Units 6/16/21 1010  QT Interval   ms 362     Narrative & Impression      HEART RATE= 75  bpm  RR Interval= 796  ms  WA Interval= 158  ms  P Horizontal Axis= 35  deg  P Front Axis= 39  deg  QRSD Interval= 89  ms  QT Interval= 362  ms  QRS Axis= -11  deg  T Wave Axis= 43  deg  - NORMAL ECG -  Sinus rhythm  NO PRIOR TRACING AVAILABLE FOR COMPARISON  Electronically Signed By: Paty Moralez ([facility]) 17-Jun-2021 17:54:21  Date and Time of Study: 2021-06-16 10:10:57    Specimen Collected: 06/16/21 10:10                          Anesthesia Plan    ASA 2     general with block       Anesthetic plan, all risks, benefits, and alternatives have been provided, discussed and informed consent has been obtained with: patient and spouse/significant other.  Use of blood products discussed with patient and  spouse/significant other .   Plan discussed with CRNA.       - - -
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
## 2021-07-02 ENCOUNTER — OFFICE VISIT (OUTPATIENT)
Dept: ORTHOPEDIC SURGERY | Facility: CLINIC | Age: 71
End: 2021-07-02

## 2021-07-02 VITALS — WEIGHT: 160.5 LBS | HEIGHT: 67 IN | BODY MASS INDEX: 25.19 KG/M2

## 2021-07-02 DIAGNOSIS — M75.21 BICEPS TENDINITIS OF RIGHT UPPER EXTREMITY: ICD-10-CM

## 2021-07-02 DIAGNOSIS — M75.121 COMPLETE TEAR OF RIGHT ROTATOR CUFF, UNSPECIFIED WHETHER TRAUMATIC: ICD-10-CM

## 2021-07-02 DIAGNOSIS — M75.41 SUBACROMIAL IMPINGEMENT OF RIGHT SHOULDER: ICD-10-CM

## 2021-07-02 DIAGNOSIS — Z98.890 STATUS POST ARTHROSCOPY OF SHOULDER: Primary | ICD-10-CM

## 2021-07-02 PROCEDURE — 99024 POSTOP FOLLOW-UP VISIT: CPT | Performed by: NURSE PRACTITIONER

## 2021-07-02 RX ORDER — OXYCODONE HYDROCHLORIDE AND ACETAMINOPHEN 5; 325 MG/1; MG/1
TABLET ORAL
Qty: 36 TABLET | Refills: 0 | Status: SHIPPED | OUTPATIENT
Start: 2021-07-02 | End: 2021-07-16

## 2021-07-02 NOTE — PROGRESS NOTES
CC: F/u s/p right shoulder arthroscopic rotator cuff repair with bio inductive implant, open biceps tenodesis DOS 2021    Interval History: Patient returns to clinic stating pain is doing fairly well, has been using sling as instructed, denies any numbness or tingling over right arm. No fevers, chills, or sweats, and no drainage from incisions noted.    Exam:   Right shoulder- incisions clean, dry, sutures in place   Positive sensation all distributions right hand and proximal lateral aspect arm   Cap refill < 3 seconds, radial pulse 2+   Positive deltoid firing   Flex/extend fingers/thumb/wrist with 4+/5 strength, positive thumbs up, okay sign, cross finger adduction and abduction against resistance     Impression: s/p right shoulder arthroscopic rotator cuff repair with bio inductive implant, open biceps tenodesis     Plan:  1. D/c sutures today and replace with steri-strips- may shower, no submerging wounds x 4 weeks  2. F/u in 3 wks with Dr. Montaño.   3. Will start PT at 3 wk isadora, utilizing standard rotator cuff repair protocol. Continue use of sling x4 weeks total. Work on finger and wrist ROM. May do gentle elbow ROM 2x/day while out of sling for showering or changing clothes.   4. All questions answered.      New Medications Ordered This Visit   Medications   • oxyCODONE-acetaminophen (PERCOCET) 5-325 MG per tablet     Si-2 tabs every 4-6 hours, prn moderate-severe pain     Dispense:  36 tablet     Refill:  0       Orders Placed This Encounter   Procedures   • Ambulatory Referral to Physical Therapy POST OP     Referral Priority:   Routine     Referral Type:   Physical Therapy     Referral Reason:   Specialty Services Required     Requested Specialty:   Physical Therapy     Number of Visits Requested:   1

## 2021-07-16 ENCOUNTER — TELEPHONE (OUTPATIENT)
Dept: ORTHOPEDIC SURGERY | Facility: CLINIC | Age: 71
End: 2021-07-16

## 2021-07-16 NOTE — TELEPHONE ENCOUNTER
Caller: SHARON YANCEY    Relationship: SELF    Best call back number: [phone]    What orders are you requesting (i.e. lab or imaging): REFERRAL ORDER      Additional notes: PT CALLED IN ABOUT REF ORDER TO PHYSICAL THERAPY, [facility] PHYSICAL THERAPY STATED THEY RECEIVED NO ORDER TO THEIR OFFICE, SHOWING REFERRAL IN PT CHART CREATED 7.2.21 - PHYSICAL THERAPY FAX#[phone] , PLEASE CONTACT PT WITH CONFIRMATION THAT THE REFERRAL HAS BEEN RESENT

## 2021-07-16 NOTE — TELEPHONE ENCOUNTER
Patient calling was curious if he should still be in pain 3 weeks from surgery he is s/p right shoulder arthroscopic rotator cuff repair with bio inductive implant, open biceps tenodesis DOS 06/25/2021- he reports that his pain management put him back on his long term maintenance medication of Hydrocodone 10-325mg because he felt it helps better than the oxycodone he was given at post op. He is doing his ROM exercises as instructed, pain is tolerable he was just concerned that it was still present. Explained to patient that this could be just post operative pain and he should proceed with PT this week and fwp with Dr. Montaño on 07.22.2021, he did ask about driving however he was advised due to his sling he should defer this until see by Dr. Montaño. Also advised he could call the office line [phone] if he has any further questions or concerns.    Do you have any further recommendations?

## 2021-07-22 ENCOUNTER — OFFICE VISIT (OUTPATIENT)
Dept: ORTHOPEDIC SURGERY | Facility: CLINIC | Age: 71
End: 2021-07-22

## 2021-07-22 VITALS — BODY MASS INDEX: 25.19 KG/M2 | HEIGHT: 67 IN | WEIGHT: 160.5 LBS

## 2021-07-22 DIAGNOSIS — Z98.890 STATUS POST ARTHROSCOPY OF SHOULDER: Primary | ICD-10-CM

## 2021-07-22 PROCEDURE — 99024 POSTOP FOLLOW-UP VISIT: CPT | Performed by: ORTHOPAEDIC SURGERY

## 2021-07-22 RX ORDER — MELOXICAM 7.5 MG/1
7.5 TABLET ORAL DAILY
Qty: 30 TABLET | Refills: 0 | Status: SHIPPED | OUTPATIENT
Start: 2021-07-22 | End: 2021-09-30

## 2021-07-22 RX ORDER — METHYLPREDNISOLONE 4 MG/1
TABLET ORAL
Qty: 1 EACH | Refills: 0 | Status: SHIPPED | OUTPATIENT
Start: 2021-07-22 | End: 2021-08-19

## 2021-07-22 NOTE — PROGRESS NOTES
CC: F/u s/p right shoulder rotator cuff repair and biceps tenodesis  DOS 6/25/2021    Interval History: Patient returns to clinic stating pain is doing fairly well, has been using sling as instructed, denies any numbness or tingling over right arm. No fevers, chills, or sweats, and no drainage from incisions noted. He has started into physical therapy. He reports significant pain. He started physical therapy on Monday 07/19/2021 and has had 2 visits so far. He is scheduled to attend physical therapy twice a week. He presents without his sling today. The patient ran out of oxycodone and has been taking leftover hydrocodone from a prior prescription until he receives his refill. He states the oxycodone works as well as the Percocet. He also takes gabapentin and aspirin.    Exam:   Right shoulder- incisions healing well   Tolerates FF- 100 degrees passively,   ER- 10 degrees   Tolerates gentle pendulum exercises in a 30-degree arc   Positive sensation all distributions right hand and proximal lateral aspect arm, positive deltoid firing   Cap refill < 3 seconds, radial pulse 2+   Positive deltoid firing in all 3 components.   Flex/extend fingers/thumb/wrist with 4+/5 strength, positive thumbs up, okay sign, cross finger adduction and abduction against resistance    REHAB:  Patient will be placed on standard rotator cuff repair protocol, sling x4 weeks, start physical therapy at week 3.     Impression: s/p right shoulder rotator cuff repair and biceps tenodesis     Plan:  1. Continue PT for work on ROM and progressing into strengthening per protocol  2. Discontinue sling  3. Instructed on passive ROM exercises to be done multiple times daily at home  4. We prescribed a Medrol Dosepak and meloxicam. He is to complete the Medrol Dosepak prior to starting the meloxicam.   5. F/u in 4 weeks to evaluate motion and progress with PT  6. All questions answered      New Medications Ordered This Visit   Medications   •  methylPREDNISolone (MEDROL) 4 MG dose pack     Sig: Take as directed on package instructions.     Dispense:  1 each     Refill:  0   • meloxicam (MOBIC) 7.5 MG tablet     Sig: Take 1 tablet by mouth Daily.     Dispense:  30 tablet     Refill:  0       No orders of the defined types were placed in this encounter.    Scribed for Shalom Montaño MD by Opal Emerson.  07/22/21   10:33 EDT    I have personally performed the services described in this document as scribed by the above individual, and it is both accurate and complete.  Shalom Montaño MD  7/22/2021  17:37 EDT

## 2021-08-19 ENCOUNTER — OFFICE VISIT (OUTPATIENT)
Dept: ORTHOPEDIC SURGERY | Facility: CLINIC | Age: 71
End: 2021-08-19

## 2021-08-19 VITALS — HEIGHT: 67 IN | WEIGHT: 160 LBS | BODY MASS INDEX: 25.11 KG/M2

## 2021-08-19 DIAGNOSIS — M75.121 COMPLETE TEAR OF RIGHT ROTATOR CUFF, UNSPECIFIED WHETHER TRAUMATIC: ICD-10-CM

## 2021-08-19 DIAGNOSIS — M75.21 BICEPS TENDINITIS OF RIGHT UPPER EXTREMITY: ICD-10-CM

## 2021-08-19 DIAGNOSIS — Z98.890 STATUS POST ARTHROSCOPY OF SHOULDER: Primary | ICD-10-CM

## 2021-08-19 PROCEDURE — 99024 POSTOP FOLLOW-UP VISIT: CPT | Performed by: ORTHOPAEDIC SURGERY

## 2021-08-19 NOTE — PROGRESS NOTES
CC: F/u s/p right shoulder rotator cuff repair and biceps tenodesis  DOS 6/25/2021    Interval History: Patient returns to clinic stating he has some soreness, denies any numbness or tingling over right arm. No fevers, chills, or sweats, and no drainage from incisions noted. He has started into physical therapy in which he experiences soreness. He notes his soreness is present for a couple of days following therapy. The patient localizes his pain mostly laterally and posterior in location. He describes soreness with reaching overhead for a cabinet door. The patient attends therapy twice per week. The patient states the oral steroid, prescribed at the last visit, was beneficial. He denies having any significant side effects with this medication.     Exam:    Right shoulder- incisions well-healed    Tenderness  located lateral and posterior               FF-  4/5    Active- 175 degrees               ER-  4/5    Active- to 30               IR  - to L4                 Belly press test- 4+/5               Positive sensation, 2+ radial pulse.     Positive sensation all distributions right hand and proximal lateral aspect arm, positive deltoid firing   Cap refill < 3 seconds, radial pulse 2+   Positive deltoid firing   Flex/extend fingers/thumb/wrist with 4+/5 strength, positive thumbs up, okay sign, cross finger adduction and abduction against resistance     Impression: s/p right shoulder rotator cuff repair and biceps tenodesis     Plan:  1. Continue PT for work on ROM and progressing into strengthening per protocol  2. I advised him to take it slow with lifting and raising his upper extremity.  3. The patient is pleased with his progress in physical therapy. I would like to see how the next 2 weeks of physical therapy goes, if he does not have much in the way of progress, I will prescribe a steroid.  4. I advised the patient he is able to lift 2 to 4 pounds, nothing more at this time.  5. I will follow up with the  patient in 6 weeks.       No orders of the defined types were placed in this encounter.      No orders of the defined types were placed in this encounter.      Scribed for Shalom Montaño MD by Sonja Cerda  8/19/2021  13:20 EDT

## 2021-09-01 ENCOUNTER — TELEPHONE (OUTPATIENT)
Dept: ORTHOPEDIC SURGERY | Facility: CLINIC | Age: 71
End: 2021-09-01

## 2021-09-01 RX ORDER — PREDNISONE 10 MG/1
TABLET ORAL
Qty: 39 TABLET | Refills: 0 | Status: SHIPPED | OUTPATIENT
Start: 2021-09-01 | End: 2021-09-30

## 2021-09-01 NOTE — TELEPHONE ENCOUNTER
Provider: DR DAN    Caller: SHARON YANCEY    Relationship to Patient: SELF    Pharmacy: [facility] ON [address]    Phone Number: [phone]    Reason for Call: PT HAD SHOULDER SURGERY W/ DR DAN IN June, SAID THAT DR DAN TOLD HIM IF HE WAS STILL HAVING PAIN THAT HE COULD PRESCRIBE A 12-DAY SUPPLY OF PREDNISONE AND A STRONGER DOSE. PT WOULD LIKE THAT RX SENT IN, SAID THAT HIS PHYSICAL THERAPIST ALSO THINKS IT WOULD BE HELPFUL. PLEASE CALL HIM BACK AT THE NUMBER ABOVE.

## 2021-09-30 ENCOUNTER — OFFICE VISIT (OUTPATIENT)
Dept: ORTHOPEDIC SURGERY | Facility: CLINIC | Age: 71
End: 2021-09-30

## 2021-09-30 VITALS — WEIGHT: 160 LBS | BODY MASS INDEX: 25.11 KG/M2 | HEIGHT: 67 IN

## 2021-09-30 DIAGNOSIS — M75.41 SUBACROMIAL IMPINGEMENT OF RIGHT SHOULDER: ICD-10-CM

## 2021-09-30 DIAGNOSIS — Z98.890 STATUS POST ARTHROSCOPY OF SHOULDER: Primary | ICD-10-CM

## 2021-09-30 PROCEDURE — 20610 DRAIN/INJ JOINT/BURSA W/O US: CPT | Performed by: ORTHOPAEDIC SURGERY

## 2021-09-30 RX ADMIN — LIDOCAINE HYDROCHLORIDE 4 ML: 10 INJECTION, SOLUTION EPIDURAL; INFILTRATION; INTRACAUDAL; PERINEURAL at 10:38

## 2021-09-30 RX ADMIN — TRIAMCINOLONE ACETONIDE 80 MG: 40 INJECTION, SUSPENSION INTRA-ARTICULAR; INTRAMUSCULAR at 10:38

## 2021-10-01 ENCOUNTER — TELEPHONE (OUTPATIENT)
Dept: ORTHOPEDIC SURGERY | Facility: CLINIC | Age: 71
End: 2021-10-01

## 2021-10-01 NOTE — TELEPHONE ENCOUNTER
Provider: DR. DAN  Caller: SHARON YANCEY  Relationship to Patient: SELF     Phone Number: [phone]  Reason for Call: PT FOR RT SHOULDER  When was the patient last seen: 9-30-21    PATIENT WOULD LIKE A CALL BACK TO DISCUSS ANTHEM DENIAL FOR RT SHOULDER PHYSICAL THERAPY. PATIENT ALSO PROVIDED ANTHEM INFORMATION BELOW.    REF # [id number]  MEMBER # [id number]
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
## 2021-10-07 ENCOUNTER — TREATMENT (OUTPATIENT)
Dept: PHYSICAL THERAPY | Facility: CLINIC | Age: 71
End: 2021-10-07

## 2021-10-07 DIAGNOSIS — R29.3 POOR POSTURE: ICD-10-CM

## 2021-10-07 DIAGNOSIS — M62.89 MUSCLE TIGHTNESS: ICD-10-CM

## 2021-10-07 DIAGNOSIS — M25.511 ACUTE PAIN OF RIGHT SHOULDER: Primary | ICD-10-CM

## 2021-10-07 DIAGNOSIS — Z98.890 S/P RIGHT ROTATOR CUFF REPAIR: ICD-10-CM

## 2021-10-07 DIAGNOSIS — M25.619 LIMITED RANGE OF MOTION (ROM) OF SHOULDER: ICD-10-CM

## 2021-10-07 PROCEDURE — 97110 THERAPEUTIC EXERCISES: CPT | Performed by: PHYSICAL THERAPIST

## 2021-10-07 PROCEDURE — 97162 PT EVAL MOD COMPLEX 30 MIN: CPT | Performed by: PHYSICAL THERAPIST

## 2021-10-07 PROCEDURE — 97035 APP MDLTY 1+ULTRASOUND EA 15: CPT | Performed by: PHYSICAL THERAPIST

## 2021-10-07 NOTE — PATIENT INSTRUCTIONS
Access Code: [email or web address]  URL: [email or web address]  Date: 10/07/2021  Prepared by: Rosalinda Darden    Exercises  Supine Shoulder Flexion Extension Full Range AROM - 1 x daily - 7 x weekly - 10 reps - 5 hold  Sidelying Shoulder Abduction Palm Forward - 1 x daily - 7 x weekly - 10 reps - 5 hold  Sidelying Shoulder ER with Towel and Dumbbell - 1 x daily - 7 x weekly - 10 reps - 5 hold  Shoulder External Rotation with Anchored Resistance - 1 x daily - 7 x weekly - 10 reps  Shoulder Internal Rotation with Resistance - 1 x daily - 7 x weekly - 10 reps  Standing Row with Anchored Resistance - 1 x daily - 7 x weekly - 10 reps  Standing Shoulder Internal Rotation Stretch with Towel - 1 x daily - 7 x weekly - 5 reps - 10 hold  Doorway Pec Stretch at 60 Elevation - 1 x daily - 7 x weekly - 5 reps - 10 hold  Seated Shoulder Flexion AAROM with Pulley Behind - 1 x daily - 7 x weekly  Seated Shoulder Abduction AAROM with Pulley Behind - 1 x daily - 7 x weekly  Seated Upper Trapezius Stretch - 1 x daily - 7 x weekly - 3 sets - 10 hold  Seated Levator Scapulae Stretch - 1 x daily - 7 x weekly - 3 sets - 10 hold

## 2021-10-20 ENCOUNTER — OFFICE VISIT (OUTPATIENT)
Dept: ORTHOPEDIC SURGERY | Facility: CLINIC | Age: 71
End: 2021-10-20

## 2021-10-20 VITALS
DIASTOLIC BLOOD PRESSURE: 82 MMHG | BODY MASS INDEX: 24.33 KG/M2 | SYSTOLIC BLOOD PRESSURE: 145 MMHG | HEIGHT: 67 IN | RESPIRATION RATE: 16 BRPM | HEART RATE: 73 BPM | WEIGHT: 155 LBS

## 2021-10-20 DIAGNOSIS — Z98.890 STATUS POST ARTHROSCOPY OF SHOULDER: ICD-10-CM

## 2021-10-20 DIAGNOSIS — M75.41 SUBACROMIAL IMPINGEMENT OF RIGHT SHOULDER: ICD-10-CM

## 2021-10-20 DIAGNOSIS — M25.562 LEFT KNEE PAIN, UNSPECIFIED CHRONICITY: Primary | ICD-10-CM

## 2021-10-20 DIAGNOSIS — M94.262 CHONDROMALACIA OF KNEE, LEFT: ICD-10-CM

## 2021-10-20 DIAGNOSIS — M17.12 PRIMARY OSTEOARTHRITIS OF LEFT KNEE: ICD-10-CM

## 2021-10-20 PROCEDURE — 73562 X-RAY EXAM OF KNEE 3: CPT | Performed by: ORTHOPAEDIC SURGERY

## 2021-10-20 PROCEDURE — 20610 DRAIN/INJ JOINT/BURSA W/O US: CPT | Performed by: ORTHOPAEDIC SURGERY

## 2021-10-20 PROCEDURE — 99214 OFFICE O/P EST MOD 30 MIN: CPT | Performed by: ORTHOPAEDIC SURGERY

## 2021-10-20 RX ADMIN — LIDOCAINE HYDROCHLORIDE 8 ML: 10 INJECTION, SOLUTION EPIDURAL; INFILTRATION; INTRACAUDAL; PERINEURAL at 08:55

## 2021-10-20 RX ADMIN — LIDOCAINE HYDROCHLORIDE 4 ML: 10 INJECTION, SOLUTION EPIDURAL; INFILTRATION; INTRACAUDAL; PERINEURAL at 08:54

## 2021-10-20 RX ADMIN — TRIAMCINOLONE ACETONIDE 80 MG: 40 INJECTION, SUSPENSION INTRA-ARTICULAR; INTRAMUSCULAR at 08:55

## 2021-10-20 RX ADMIN — TRIAMCINOLONE ACETONIDE 80 MG: 40 INJECTION, SUSPENSION INTRA-ARTICULAR; INTRAMUSCULAR at 08:54

## 2021-10-20 NOTE — PROGRESS NOTES
Subjective:     Patient ID: Mason Angel is a 70 y.o. male.    Chief Complaint:  Left knee pain, new issue  F/u s/p right shoulder rotator cuff repair and biceps tenodesis  DOS 6/25/2021  Right shoulder subacromial injection-9/30/2021  History of Present Illness  Mason Angel returns to the clinic today for follow-up status post right shoulder rotator cuff repair, biceps tenodesis, and evaluation of left knee pain, new issue. He received right shoulder subacromial injection treatment on 09/30/2021 that provided moderate relief of symptoms for approximately 1 week. He reports the majority of his residual pain is localized to the posterolateral aspect of the shoulder. His current pain level is a 6 out of 10 and reports his occasional peak pain level a 10 out of 10. The patient was seen once by Rosalinda Strauss PT, at [facility] physical therapy on 10/07/2021 and was informed that he would not be able to proceed with further treatment due to insurance disapproval.      He also reports to this clinic today for evaluation of left knee pain, with no specific injury,  that has been persistent since approximately 2014 after experiencing a stroke. The patient has experienced a few fall incidents that he believes are associated with weakness of his ankle. His pain is rated an 8 out of 10 that is localized primarily to the anterior aspect of the knee that he describes as achy in nature, accompanied with occasional sensations of popping.  He confirms mild tingling and numbness within the ankle and foot. The patient also reports weakness of the hamstring with occasional episodes of instability sensations. He has been provided with a carbon fiber brace for additional support but reports extreme discomfort with prolonged wear.         Social History     Occupational History   • Not on file   Tobacco Use   • Smoking status: Never Smoker   • Smokeless tobacco: Never Used   Vaping Use   • Vaping Use: Never used   Substance  "and Sexual Activity   • Alcohol use: Yes     Comment: a few a week   • Drug use: Never   • Sexual activity: Defer      Past Medical History:   Diagnosis Date   • GERD (gastroesophageal reflux disease)    • Hyperlipidemia    • Stroke (HCC)     3/2014     Past Surgical History:   Procedure Laterality Date   • APPENDECTOMY     • BICEPS TENDONESIS SUBPECTORALIS REPAIR Right 6/25/2021    Procedure: BICEPS TENDONESIS SUBPECTORALIS MINI OPEN REPAIR;  Surgeon: Shalom Montaño MD;  Location:  [facility];  Service: Orthopedics;  Laterality: Right;   • FOOT SURGERY Left     x2   • LAPAROSCOPIC CHOLECYSTECTOMY     • SHOULDER ARTHROSCOPY W/ ROTATOR CUFF REPAIR Right 6/25/2021    Procedure: SHOULDER ARTHROSCOPY WITH ROTATOR CUFF REPAIR;  Surgeon: Shalom Montaño MD;  Location:  [facility];  Service: Orthopedics;  Laterality: Right;  RIGHT SHOULDER ARTHROSCOPY WITH ROTATOR CUFF REPAIR       Family History   Problem Relation Age of Onset   • Cancer Mother          Review of Systems        Objective:  Vitals:    10/20/21 0801   BP: 145/82   Pulse: 73   Resp: 16   Weight: 70.3 kg (155 lb)   Height: 170.2 cm (67\")         10/20/21  0801   Weight: 70.3 kg (155 lb)     Body mass index is 24.28 kg/m².  Physical Exam    Vital signs reviewed.   General: No acute distress, alert and oriented  Eyes: conjunctiva clear; pupils equally round and reactive  ENT: external ears and nose atraumatic; oropharynx clear  CV: no peripheral edema  Resp: normal respiratory effort  Skin: no rashes or wounds; normal turgor  Psych: mood and affect appropriate; recent and remote memory intact          Ortho Exam     Exam:                            Right shoulder- incisions well-healed              Tenderness located primarily within the posterior and lateral aspect of the shoulder                  FF- Active- 155 degrees              Passive- 165 degrees              Strength- 4/5              ER- Active- 40              Strength- 4+/5              IR  " Strength- 4+/5                 Empty can test- Minimally positive.               Neer's sign- Positive.               Cuenca- Positive.                Positive sensation all distributions right hand and proximal lateral aspect arm, positive deltoid firing              Cap refill < 3 seconds, radial pulse 2+              Positive deltoid firing              Flex/extend fingers/thumb/wrist with 4+/5 strength, positive thumbs up, okay sign, cross finger adduction and abduction against resistance                Left Knee-   Active  degrees  4/5 on flexion  4/5 on extension  Maximal tenderness on the medial and lateral patellar facet.   No medial or lateral joint line pain  Emma's exam- Negative  Effusion- Mild    Grade 1A [place]  Anterior drawer- Negative   Posterior drawer- Negative   Stable opening on varus and valgus stress at 0 and 30  Active patellar compression test- Positive          Imaging:  Left Knee X-Ray  Indication: Pain    AP, Lateral, and [place] views    Findings:  No fracture  No bony lesion  Normal soft tissues  Moderate medial compartment joint space narrowing with minimal reactive osteophyte formation appreciated, moderate varus alignment appreciated    No prior studies were available for comparison.    Assessment:        1. Left knee pain, unspecified chronicity    2. Status post arthroscopy of shoulder    3. Subacromial impingement of right shoulder    4. Chondromalacia of knee, left    5. Primary osteoarthritis of left knee           Plan:  Large Joint Arthrocentesis: R subacromial bursa  Date/Time: 10/20/2021 8:54 AM  Consent given by: patient  Site marked: site marked  Timeout: Immediately prior to procedure a time out was called to verify the correct patient, procedure, equipment, support staff and site/side marked as required   Supporting Documentation  Indications: pain   Procedure Details  Location: shoulder - R subacromial bursa  Preparation: Patient was prepped and draped in  the usual sterile fashion  Needle size: 22 G  Approach: posterior  Medications administered: 4 mL lidocaine PF 1% 1 %; 80 mg triamcinolone acetonide 40 MG/ML  Patient tolerance: patient tolerated the procedure well with no immediate complications    Large Joint Arthrocentesis: L knee  Date/Time: 10/20/2021 8:55 AM  Consent given by: patient  Site marked: site marked  Timeout: Immediately prior to procedure a time out was called to verify the correct patient, procedure, equipment, support staff and site/side marked as required   Supporting Documentation  Indications: pain   Procedure Details  Location: knee - L knee  Preparation: Patient was prepped and draped in the usual sterile fashion  Needle size: 22 G  Approach: anterolateral  Medications administered: 8 mL lidocaine PF 1% 1 %; 80 mg triamcinolone acetonide 40 MG/ML  Patient tolerance: patient tolerated the procedure well with no immediate complications            Plan:  1. Discussed treatment options at length with patient at today's visit. We will proceed with injections to the right posterior subacromial shoulder given his limited improvement last time. The patient is also interested in proceeding with left knee injections at this time.   2. Patient would like to proceed with cortisone injection today to the right shoulder subacromial space from a posterior apporach. Recommended limited use of affected extremity for the next 24 hours to only essential activites other than work on general active and passive motion. Recommended supplementing with ice and soft tissue massage. Discussed with patient that they should see results in 5-7 days, if no improvement in 5-6 weeks I have asked them to call the office to review other options. Patient should call office immediately if they notice redness, warmth, fevers, chills, or residual numbness or tingling for greater than 6 hours after injection.     3. Patient would like to proceed with cortisone injection today to  the left knee. Recommended limited use of affected extremity for the next 24 hours to only essential activites other than work on general active and passive motion. Recommended supplementing with ice and soft tissue massage. Discussed with patient that they should see results in 5-7 days, if no improvement in 5-6 weeks I have asked them to call the office to review other options. Patient should call office immediately if they notice redness, warmth, fevers, chills, or residual numbness or tingling for greater than 6 hours after injection.   4. The patient will continue to work on at home exercises and strengthening as tolerated.    5.  I will plan to follow up with the patient in 6 weeks to check progress of both his right shoulder and left knee. If he is still not receiving a good response with the injection to the shoulder, then we may talk about MRI option at that time. In regard to his left knee, if he is not provided significant relief with cortisone injection treatment then we will proceed with looking into gel injections.   6. Follow up: 6 weeks     Mason Stanley was in agreement with plan and had all questions answered.     Orders:  Orders Placed This Encounter   Procedures   • Large Joint Arthrocentesis: R subacromial bursa   • Large Joint Arthrocentesis: L knee   • XR Knee 3 View Left       Medications:  No orders of the defined types were placed in this encounter.      Followup:  Return in about 6 weeks (around 12/1/2021).    Diagnoses and all orders for this visit:    1. Left knee pain, unspecified chronicity (Primary)  -     XR Knee 3 View Left    2. Status post arthroscopy of shoulder    3. Subacromial impingement of right shoulder    4. Chondromalacia of knee, left    5. Primary osteoarthritis of left knee    Other orders  -     Large Joint Arthrocentesis: R subacromial bursa  -     Large Joint Arthrocentesis: L knee          Dictated utilizing Dragon dictation   Transcribed from ambient dictation  for Shalom Montaño MD by Viv Lopez.  10/20/21   11:19 EDT    I have personally performed the services described in this document as transcribed by the above individual, and it is both accurate and complete.  Shalom Montaño MD  10/27/2021  22:38 EDT

## 2021-10-21 RX ORDER — LIDOCAINE HYDROCHLORIDE 10 MG/ML
4 INJECTION, SOLUTION EPIDURAL; INFILTRATION; INTRACAUDAL; PERINEURAL
Status: COMPLETED | OUTPATIENT
Start: 2021-09-30 | End: 2021-09-30

## 2021-10-21 RX ORDER — TRIAMCINOLONE ACETONIDE 40 MG/ML
80 INJECTION, SUSPENSION INTRA-ARTICULAR; INTRAMUSCULAR
Status: COMPLETED | OUTPATIENT
Start: 2021-09-30 | End: 2021-09-30

## 2021-10-27 RX ORDER — TRIAMCINOLONE ACETONIDE 40 MG/ML
80 INJECTION, SUSPENSION INTRA-ARTICULAR; INTRAMUSCULAR
Status: COMPLETED | OUTPATIENT
Start: 2021-10-20 | End: 2021-10-20

## 2021-10-27 RX ORDER — LIDOCAINE HYDROCHLORIDE 10 MG/ML
8 INJECTION, SOLUTION EPIDURAL; INFILTRATION; INTRACAUDAL; PERINEURAL
Status: COMPLETED | OUTPATIENT
Start: 2021-10-20 | End: 2021-10-20

## 2021-10-27 RX ORDER — LIDOCAINE HYDROCHLORIDE 10 MG/ML
4 INJECTION, SOLUTION EPIDURAL; INFILTRATION; INTRACAUDAL; PERINEURAL
Status: COMPLETED | OUTPATIENT
Start: 2021-10-20 | End: 2021-10-20

## 2021-12-02 ENCOUNTER — OFFICE VISIT (OUTPATIENT)
Dept: ORTHOPEDIC SURGERY | Facility: CLINIC | Age: 71
End: 2021-12-02

## 2021-12-02 VITALS — BODY MASS INDEX: 24.33 KG/M2 | HEIGHT: 67 IN | WEIGHT: 155 LBS

## 2021-12-02 DIAGNOSIS — R29.898 SHOULDER WEAKNESS: ICD-10-CM

## 2021-12-02 DIAGNOSIS — Z98.890 STATUS POST ARTHROSCOPY OF SHOULDER: Primary | ICD-10-CM

## 2021-12-02 DIAGNOSIS — M17.12 PRIMARY OSTEOARTHRITIS OF LEFT KNEE: ICD-10-CM

## 2021-12-02 DIAGNOSIS — M75.41 SUBACROMIAL IMPINGEMENT OF RIGHT SHOULDER: ICD-10-CM

## 2021-12-02 PROCEDURE — 99213 OFFICE O/P EST LOW 20 MIN: CPT | Performed by: ORTHOPAEDIC SURGERY

## 2021-12-02 RX ORDER — CLOBETASOL PROPIONATE 0.46 MG/ML
SOLUTION TOPICAL AS NEEDED
COMMUNITY
Start: 2021-11-05

## 2021-12-02 RX ORDER — TRIAMCINOLONE ACETONIDE 1 MG/G
1 CREAM TOPICAL AS NEEDED
COMMUNITY
Start: 2021-11-05

## 2021-12-02 NOTE — PROGRESS NOTES
Subjective:     Patient ID: Mason Angel is a 70 y.o. male.    Chief Complaint:  F/u s/p right shoulder rotator cuff repair and biceps tenodesis  DOS 6/25/2021  Right shoulder subacromial injection-10/20/2021    Follow-up left knee pain, DJD  Last injection-left knee-cortisone-10/20/2021  History of Present Illness  Mason Angel returns to clinic today for evaluation of right shoulder pain and left knee pain. He complains of pain primarily posterolateral, occasionally sharp, and up to a 7 to 8 out of 10 pain level. Activities that make it worse are reaching overhead, lifting, pushing, and pulling. He also notes sleeping on his right side, which he tries to avoid, worsens his right shoulder pain. The patient has had 2 previous right subacromial cortisone injections.     He notes his left knee still hurts, and states he only had relief from the cortisone injection for 3 to 4  days.  The patient is agreeable to trying the gel viscosupplementation to help relieve his pain. He has tenderness in the medial and patellofemoral compartment, worse with ambulation. The patient rates his pain as an 8 to 9 out of 10. He does have some associated swelling.   Social History     Occupational History   • Not on file   Tobacco Use   • Smoking status: Never Smoker   • Smokeless tobacco: Never Used   Vaping Use   • Vaping Use: Never used   Substance and Sexual Activity   • Alcohol use: Yes     Comment: a few a week   • Drug use: Never   • Sexual activity: Defer      Past Medical History:   Diagnosis Date   • GERD (gastroesophageal reflux disease)    • Hyperlipidemia    • Stroke (HCC)     3/2014     Past Surgical History:   Procedure Laterality Date   • APPENDECTOMY     • BICEPS TENDONESIS SUBPECTORALIS REPAIR Right 6/25/2021    Procedure: BICEPS TENDONESIS SUBPECTORALIS MINI OPEN REPAIR;  Surgeon: Shalom Montaño MD;  [facility];  Service: Orthopedics;  Laterality: Right;   • FOOT SURGERY Left     x2   •  "LAPAROSCOPIC CHOLECYSTECTOMY     • SHOULDER ARTHROSCOPY W/ ROTATOR CUFF REPAIR Right 6/25/2021    Procedure: SHOULDER ARTHROSCOPY WITH ROTATOR CUFF REPAIR;  Surgeon: Shalom Montaño MD;  Location: [facility];  Service: Orthopedics;  Laterality: Right;  RIGHT SHOULDER ARTHROSCOPY WITH ROTATOR CUFF REPAIR       Family History   Problem Relation Age of Onset   • Cancer Mother          Review of Systems   Musculoskeletal: Positive for arthralgias and myalgias.           Objective:  Vitals:    12/02/21 1007   Weight: 70.3 kg (155 lb)   Height: 170.2 cm (67\")         12/02/21  1007   Weight: 70.3 kg (155 lb)     Body mass index is 24.28 kg/m².  General: No acute distress.  Resp: normal respiratory effort  Skin: no rashes or wounds; normal turgor  Psych: mood and affect appropriate; recent and remote memory intact          Ortho Exam     Right shoulder-  Maximal tenderness is located posterolateral subacromial space  Moderate pain into the posterior scapula  FF-   Active- to 160   Strength- 4-/5  ER-      Active- to 50  Strength- 4+/5    Belly Press-  4+/5  Empty can test- Minimally positive  Neer's sign- Positive  Cuenca- Positive  Cross arm test- Negative  Tenderness over AC joint- Negative     Left Knee-  ROM 2 to 125 degrees  Strength 4+/5    Maximal tenderness medial and patellofemoral compartment  Moderate tenderness medial joint line  Stable opening on varus and valgus stress at 0 and 30  Active patellar compression test- Positive  Emma- Moderately positive     Positive sensation light touch all distributions symmetric to contralateral side  2+ dorsalis pedis pulse    Imaging:  None today  Assessment:        1. Status post arthroscopy of shoulder    2. Subacromial impingement of right shoulder    3. Primary osteoarthritis of left knee    4. Shoulder weakness           Plan:          1. Discussed treatment options at length with patient at today's visit.    2. At this point in time, he is having persistent " and significant residual pain to his right posterolateral shoulder with associated weakness, particularly on forward flexion. He has failed greater than 6 weeks of physical therapy and no additional therapy would be approved by his insurance company. He has failed two subacromial injections and notes persistent pain with weakness. At this point in time, an MR arthrogram is warranted to evaluate for recurrent rotator cuff tear or failure of healing from prior repair.  3. In regards to his left knee, he does have moderate osteoarthritis and has failed intra-articular cortisone injection, activity modification, over-the-counter anti-inflammatory medications. We will submit for viscosupplement injection for the left knee at this time, and will be in touch with him to follow up for that pending approval from insurance. All questions answered today.      Mason Angel was in agreement with plan and had all questions answered.     Orders:  Orders Placed This Encounter   Procedures   • MRI shoulder right arthrogram   • FL Arthrogram Shoulder Right   • Visco Treatment       Medications:  No orders of the defined types were placed in this encounter.      Followup:  No follow-ups on file.    Diagnoses and all orders for this visit:    1. Status post arthroscopy of shoulder (Primary)  -     Cancel: FL Contrast Injection CT / MRI; Future  -     MRI shoulder right arthrogram; Future  -     FL Arthrogram Shoulder Right; Future    2. Subacromial impingement of right shoulder  -     Cancel: FL Contrast Injection CT / MRI; Future  -     MRI shoulder right arthrogram; Future  -     FL Arthrogram Shoulder Right; Future    3. Primary osteoarthritis of left knee  -     Visco Treatment; Future    4. Shoulder weakness  -     Cancel: [place] Contrast Injection CT / MRI; Future  -     MRI shoulder right arthrogram; Future  -     FL Arthrogram Shoulder Right; Future      Transcribed from ambient dictation for Shalom Montaño MD by Fide  Charles.  12/02/21   13:51 EST    Patient verbalized consent to the visit recording.  I have personally performed the services described in this document as transcribed by the above individual, and it is both accurate and complete.  Shalom Montaño MD  12/15/2021  22:24 EST        Dictated utilizing Dragon dictation

## 2021-12-29 ENCOUNTER — HOSPITAL ENCOUNTER (OUTPATIENT)
Dept: GENERAL RADIOLOGY | Facility: HOSPITAL | Age: 71
End: 2021-12-29

## 2021-12-29 ENCOUNTER — HOSPITAL ENCOUNTER (OUTPATIENT)
Dept: MRI IMAGING | Facility: HOSPITAL | Age: 71
End: 2021-12-29

## 2022-01-26 ENCOUNTER — CLINICAL SUPPORT (OUTPATIENT)
Dept: ORTHOPEDIC SURGERY | Facility: CLINIC | Age: 72
End: 2022-01-26

## 2022-01-26 VITALS — BODY MASS INDEX: 24.33 KG/M2 | HEIGHT: 67 IN | WEIGHT: 155 LBS

## 2022-01-26 DIAGNOSIS — M17.12 PRIMARY OSTEOARTHRITIS OF LEFT KNEE: Primary | ICD-10-CM

## 2022-01-26 PROCEDURE — 20610 DRAIN/INJ JOINT/BURSA W/O US: CPT | Performed by: NURSE PRACTITIONER

## 2022-01-26 NOTE — PROGRESS NOTES
Large Joint Arthrocentesis: L knee  Date/Time: 1/26/2022 1:05 PM  Consent given by: patient  Site marked: site marked  Timeout: Immediately prior to procedure a time out was called to verify the correct patient, procedure, equipment, support staff and site/side marked as required   Supporting Documentation  Indications: pain   Procedure Details  Location: knee - L knee  Preparation: Patient was prepped and draped in the usual sterile fashion  Needle gauge: 21-1-1/2.  Approach: superior  Medications administered: 60 mg Sodium Hyaluronate 60 MG/3ML  Patient tolerance: patient tolerated the procedure well with no immediate complications      Patient presents to clinic today for left knee viscosupplement injections.  This is the single injection of the series.  I explained details of injections as well as risks, benefits and alternatives with the patient today, had all questions answered, wished to proceed with injections. Patient was instructed to watch for signs or symptoms of infection including redness, swelling, warmth to the touch, or significant increased pain and to contact our office immediately if any of these issues were noted.

## 2022-02-02 ENCOUNTER — HOSPITAL ENCOUNTER (OUTPATIENT)
Dept: MRI IMAGING | Facility: HOSPITAL | Age: 72
Discharge: HOME | End: 2022-02-02

## 2022-02-02 ENCOUNTER — HOSPITAL ENCOUNTER (OUTPATIENT)
Dept: GENERAL RADIOLOGY | Facility: HOSPITAL | Age: 72
Discharge: HOME | End: 2022-02-02

## 2022-02-02 DIAGNOSIS — M75.41 SUBACROMIAL IMPINGEMENT OF RIGHT SHOULDER: ICD-10-CM

## 2022-02-02 DIAGNOSIS — R29.898 SHOULDER WEAKNESS: ICD-10-CM

## 2022-02-02 DIAGNOSIS — Z98.890 STATUS POST ARTHROSCOPY OF SHOULDER: ICD-10-CM

## 2022-02-02 PROCEDURE — 0 GADOBENATE DIMEGLUMINE 529 MG/ML SOLUTION: Performed by: ORTHOPAEDIC SURGERY

## 2022-02-02 PROCEDURE — 0 LIDOCAINE 1 % SOLUTION: Performed by: ORTHOPAEDIC SURGERY

## 2022-02-02 PROCEDURE — 73040 CONTRAST X-RAY OF SHOULDER: CPT

## 2022-02-02 PROCEDURE — A9577 INJ MULTIHANCE: HCPCS | Performed by: ORTHOPAEDIC SURGERY

## 2022-02-02 PROCEDURE — 25010000002 IOPAMIDOL 61 % SOLUTION: Performed by: ORTHOPAEDIC SURGERY

## 2022-02-02 PROCEDURE — 73222 MRI JOINT UPR EXTREM W/DYE: CPT

## 2022-02-02 RX ORDER — LIDOCAINE HYDROCHLORIDE 10 MG/ML
2 INJECTION, SOLUTION INFILTRATION; PERINEURAL ONCE
Status: COMPLETED | OUTPATIENT
Start: 2022-02-02 | End: 2022-02-02

## 2022-02-02 RX ADMIN — LIDOCAINE HYDROCHLORIDE 2 ML: 10 INJECTION, SOLUTION INFILTRATION; PERINEURAL at 14:36

## 2022-02-02 RX ADMIN — GADOBENATE DIMEGLUMINE 1 ML: 529 INJECTION, SOLUTION INTRAVENOUS at 15:08

## 2022-02-02 RX ADMIN — IOPAMIDOL 2 ML: 612 INJECTION, SOLUTION INTRAVENOUS at 14:36

## 2022-02-07 ENCOUNTER — OFFICE VISIT (OUTPATIENT)
Dept: ORTHOPEDIC SURGERY | Facility: CLINIC | Age: 72
End: 2022-02-07

## 2022-02-07 VITALS — WEIGHT: 155 LBS | HEIGHT: 67 IN | BODY MASS INDEX: 24.33 KG/M2

## 2022-02-07 DIAGNOSIS — M19.019 AC JOINT ARTHROPATHY: ICD-10-CM

## 2022-02-07 DIAGNOSIS — M75.21 BICEPS TENDINITIS OF RIGHT UPPER EXTREMITY: ICD-10-CM

## 2022-02-07 DIAGNOSIS — M75.121 COMPLETE TEAR OF RIGHT ROTATOR CUFF, UNSPECIFIED WHETHER TRAUMATIC: ICD-10-CM

## 2022-02-07 DIAGNOSIS — M25.511 RIGHT SHOULDER PAIN, UNSPECIFIED CHRONICITY: ICD-10-CM

## 2022-02-07 DIAGNOSIS — Z98.890 STATUS POST ARTHROSCOPY OF SHOULDER: Primary | ICD-10-CM

## 2022-02-07 PROCEDURE — 20610 DRAIN/INJ JOINT/BURSA W/O US: CPT | Performed by: ORTHOPAEDIC SURGERY

## 2022-02-07 PROCEDURE — 99214 OFFICE O/P EST MOD 30 MIN: CPT | Performed by: ORTHOPAEDIC SURGERY

## 2022-02-07 RX ADMIN — LIDOCAINE HYDROCHLORIDE 4 ML: 10 INJECTION, SOLUTION EPIDURAL; INFILTRATION; INTRACAUDAL; PERINEURAL at 11:47

## 2022-02-07 RX ADMIN — TRIAMCINOLONE ACETONIDE 80 MG: 40 INJECTION, SUSPENSION INTRA-ARTICULAR; INTRAMUSCULAR at 11:47

## 2022-02-07 NOTE — PROGRESS NOTES
The patient has consented to being recorded using [employer].  Subjective:     Patient ID: Mason Angel is a 71 y.o. male.    Chief Complaint:  F/u s/p right shoulder rotator cuff repair and biceps tenodesis  DOS 6/25/2021  Right shoulder subacromial injection-10/20/2021    Follow-up MRI results, right shoulder  History of Present Illness  Mason Angel returns to clinic today for evaluation of right shoulder pain.    The patient reports that he has been experiencing constant pain in the right shoulder for the last 1 to 2 months. He describes the pain as sharp and stabbing in nature, exacerbated with motion crossbody. He localized primarily to the anterior and lateral aspect of his right shoulder. He is experiencing superior pain primarily with overhead activities, exacerbated with lifting and resistance. The patient rates his pain at this point in time as a 7 to 9 out of 10 at it's worst, he is having constant aching pain exacerbated at night. He's noted a mild improvement with continued home exercises and prior physical therapy. He has noted some moderate improvement with prior subacromial injection back in 10/2021.    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    Of note, he notes minimal improvement with recent viscosupplementaion injection 2 weeks ago, performed by Raysa.        Social History     Occupational History   • Not on file   Tobacco Use   • Smoking status: Never Smoker   • Smokeless tobacco: Never Used   Vaping Use   • Vaping Use: Never used   Substance and Sexual Activity   • Alcohol use: Yes     Comment: a few a week   • Drug use: Never   • Sexual activity: Defer      Past Medical History:   Diagnosis Date   • GERD (gastroesophageal reflux disease)    • Hyperlipidemia    • Stroke ([facility])     3/2014     Past Surgical History:   Procedure Laterality Date   • APPENDECTOMY     • BICEPS TENDONESIS SUBPECTORALIS  "REPAIR Right 6/25/2021    Procedure: BICEPS TENDONESIS SUBPECTORALIS MINI OPEN REPAIR;  Surgeon: Shalom Montaño MD;  Location:  [facility];  Service: Orthopedics;  Laterality: Right;   • FOOT SURGERY Left     x2   • LAPAROSCOPIC CHOLECYSTECTOMY     • SHOULDER ARTHROSCOPY W/ ROTATOR CUFF REPAIR Right 6/25/2021    Procedure: SHOULDER ARTHROSCOPY WITH ROTATOR CUFF REPAIR;  Surgeon: Shalom Montaño MD;  Location:  [facility];  Service: Orthopedics;  Laterality: Right;  RIGHT SHOULDER ARTHROSCOPY WITH ROTATOR CUFF REPAIR       Family History   Problem Relation Age of Onset   • Cancer Mother          Review of Systems   Constitutional: Negative for chills, diaphoresis, fever and unexpected weight change.   HENT: Negative for hearing loss, nosebleeds, sore throat and tinnitus.    Eyes: Negative for pain and visual disturbance.   Respiratory: Negative for cough, shortness of breath and wheezing.    Cardiovascular: Negative for chest pain and palpitations.   Gastrointestinal: Negative for abdominal pain, diarrhea, nausea and vomiting.   Endocrine: Negative for cold intolerance, heat intolerance and polydipsia.   Genitourinary: Negative for difficulty urinating, dysuria and hematuria.   Musculoskeletal: Positive for arthralgias and myalgias. Negative for joint swelling.   Skin: Negative for rash and wound.   Allergic/Immunologic: Negative for environmental allergies.   Neurological: Negative for dizziness, syncope and numbness.   Hematological: Does not bruise/bleed easily.   Psychiatric/Behavioral: Negative for dysphoric mood and sleep disturbance. The patient is not nervous/anxious.            Objective:  Vitals:    02/07/22 1058   Weight: 70.3 kg (155 lb)   Height: 170.2 cm (67\")         02/07/22  1058   Weight: 70.3 kg (155 lb)     Body mass index is 24.28 kg/m².  Physical Exam    Vital signs reviewed.   General: No acute distress, alert and oriented  Eyes: conjunctiva clear; pupils equally round and reactive  ENT: " external ears and nose atraumatic; oropharynx clear  CV: no peripheral edema  Resp: normal respiratory effort  Skin: no rashes or wounds; normal turgor  Psych: mood and affect appropriate; recent and remote memory intact          Ortho Exam       Right shoulder-  Maximal tenderness is located primarily to the lateral subacromial space  Mild pain into the posterior scapula  FF-       Active- to 155  Strength- 4-/5  ER-      Active- to 45  Strength- 4+/5     Belly Press-  4+/5  Empty can test-moderately positive  Neer's sign- Positive  Cuenca- Positive  Cross arm test- Negative  Tenderness over AC joint- Negative     Imaging:  MR arthrogram right shoulder  IMPRESSION:  1. Evidence of previous rotator cuff repair and biceps tendon descends since the prior exam.  2. Recurrent tear in the supraspinatus tendon as described above. There is approximately 2.3 cm of tendon retraction, and there are mild atrophic changes in the muscle.  3. Status post biceps q.d. basis since the prior exam. Blunting and attenuation the superior labrum may be postsurgical.  4. Lateral downsloping of the acromion and degenerative changes in the acromioclavicular joint. Spurring along the inferior margin of the acromioclavicular joint appears reduced from the prior exam and this may be secondary to interval surgery.    Review results from MR arthrogram right shoulder with findings as noted above including review of imaging indicate recurrent tear of the supraspinatus with 2 cm of retraction and mild atrophy noted on sagittal imaging of less than 50% appreciated, moderate degenerative change in the AC joint.  Mild reactive osteophyte formation appreciated.    Assessment:        1. Status post arthroscopy of shoulder    2. Complete tear of right rotator cuff, unspecified whether traumatic    3. Right shoulder pain, unspecified chronicity    4. Biceps tendinitis of right upper extremity    5. AC joint arthropathy           Plan:  Large Joint  Arthrocentesis: R subacromial bursa  Date/Time: 2/7/2022 11:47 AM  Consent given by: patient  Site marked: site marked  Timeout: Immediately prior to procedure a time out was called to verify the correct patient, procedure, equipment, support staff and site/side marked as required   Supporting Documentation  Indications: pain   Procedure Details  Location: shoulder - R subacromial bursa  Preparation: Patient was prepped and draped in the usual sterile fashion  Needle size: 22 G  Approach: lateral  Medications administered: 4 mL lidocaine PF 1% 1 %; 80 mg triamcinolone acetonide 40 MG/ML  Patient tolerance: patient tolerated the procedure well with no immediate complications                1. Discussed treatment options at length with patient at today's visit. I reviewed with him option for revision rotator cuff repair surgery, reverse shoulder arthroplasty, and subacromial injection.   2. He declines any surgical treatment at this point in time. I advised and he did understand the risk for further delayed treatment and repeat injections on ability to try to get this to heal if he did decide to proceed with rotator cuff repair revision in the future.   3. He would like to proceed with subacromial injection today.   4. Patient would like to proceed with cortisone injection today to the right shoulder subacromial space.. Recommended limited use of affected extremity for the next 24 hours to only essential activites other than work on general active and passive motion. Recommended supplementing with ice and soft tissue massage. Discussed with patient that they should see results in 5-7 days, if no improvement in 5-6 weeks I have asked them to call the office to review other options. Patient should call office immediately if they notice redness, warmth, fevers, chills, or residual numbness or tingling for greater than 6 hours after injection.   5. Continue working home exercises.  6. Follow up: as-needed based on results  from this injection. I advised him it could take 4 to 6 weeks for this to show progress and we would proceed from there.       Mason Angel was in agreement with plan and had all questions answered.     Orders:  Orders Placed This Encounter   Procedures   • Large Joint Arthrocentesis: R subacromial bursa       Medications:  No orders of the defined types were placed in this encounter.      Followup:  Return if symptoms worsen or fail to improve.    Diagnoses and all orders for this visit:    1. Status post arthroscopy of shoulder (Primary)    2. Complete tear of right rotator cuff, unspecified whether traumatic    3. Right shoulder pain, unspecified chronicity    4. Biceps tendinitis of right upper extremity    5. AC joint arthropathy    Other orders  -     Large Joint Arthrocentesis: R subacromial bursa          Dictated utilizing Dragon dictation     Transcribed from ambient dictation for Shalom Montaño MD by Senthil Vallaadres.  02/07/22   14:56 EST    Patient verbalized consent to the visit recording.  I have personally performed the services described in this document as transcribed by the above individual, and it is both accurate and complete.  Shalom Montaño MD  2/14/2022  21:23 EST

## 2022-02-14 RX ORDER — TRIAMCINOLONE ACETONIDE 40 MG/ML
80 INJECTION, SUSPENSION INTRA-ARTICULAR; INTRAMUSCULAR
Status: COMPLETED | OUTPATIENT
Start: 2022-02-07 | End: 2022-02-07

## 2022-02-14 RX ORDER — LIDOCAINE HYDROCHLORIDE 10 MG/ML
4 INJECTION, SOLUTION EPIDURAL; INFILTRATION; INTRACAUDAL; PERINEURAL
Status: COMPLETED | OUTPATIENT
Start: 2022-02-07 | End: 2022-02-07

## 2022-04-27 ENCOUNTER — OFFICE VISIT (OUTPATIENT)
Dept: ORTHOPEDIC SURGERY | Facility: CLINIC | Age: 72
End: 2022-04-27

## 2022-04-27 VITALS — BODY MASS INDEX: 24.33 KG/M2 | HEIGHT: 67 IN | WEIGHT: 155 LBS

## 2022-04-27 DIAGNOSIS — M19.011 PRIMARY OSTEOARTHRITIS OF RIGHT SHOULDER: ICD-10-CM

## 2022-04-27 DIAGNOSIS — Z98.890 STATUS POST ARTHROSCOPY OF SHOULDER: Primary | ICD-10-CM

## 2022-04-27 DIAGNOSIS — M75.121 COMPLETE TEAR OF RIGHT ROTATOR CUFF, UNSPECIFIED WHETHER TRAUMATIC: ICD-10-CM

## 2022-04-27 DIAGNOSIS — M25.511 RIGHT SHOULDER PAIN, UNSPECIFIED CHRONICITY: ICD-10-CM

## 2022-04-27 PROCEDURE — 99213 OFFICE O/P EST LOW 20 MIN: CPT | Performed by: ORTHOPAEDIC SURGERY

## 2022-04-28 ENCOUNTER — TELEPHONE (OUTPATIENT)
Dept: ORTHOPEDIC SURGERY | Facility: CLINIC | Age: 72
End: 2022-04-28

## 2022-04-28 NOTE — TELEPHONE ENCOUNTER
Caller: Mason Angel    Relationship: Self    Best call back number: [phone]    What form or medical record are you requesting: OFFICE NOTES AND IMAGING     Who is requesting this form or medical record from you: DR DANIEL NORIEGA     How would you like to receive the form or medical records (pick-up, mail, fax): FAX  If fax, what is the fax number: [phone]      Additional notes: PATIENT STATED DR DAN ADVISED HIM TO GET A SECOND OPINION.  PATIENT WANTS TO SEE DR DANIEL NORIEGA FOR [facility].  DR NORIEGA NEEDS RECORDS FAXED TO HIM

## 2022-05-02 ENCOUNTER — PROCEDURE VISIT (OUTPATIENT)
Dept: SPORTS MEDICINE | Facility: CLINIC | Age: 72
End: 2022-05-02

## 2022-05-02 VITALS
OXYGEN SATURATION: 97 % | DIASTOLIC BLOOD PRESSURE: 70 MMHG | RESPIRATION RATE: 16 BRPM | TEMPERATURE: 95.4 F | HEART RATE: 78 BPM | WEIGHT: 155 LBS | HEIGHT: 67 IN | BODY MASS INDEX: 24.33 KG/M2 | SYSTOLIC BLOOD PRESSURE: 130 MMHG

## 2022-05-02 DIAGNOSIS — M19.011 PRIMARY OSTEOARTHRITIS, RIGHT SHOULDER: ICD-10-CM

## 2022-05-02 DIAGNOSIS — M25.511 CHRONIC RIGHT SHOULDER PAIN: Primary | ICD-10-CM

## 2022-05-02 DIAGNOSIS — G89.29 CHRONIC RIGHT SHOULDER PAIN: Primary | ICD-10-CM

## 2022-05-02 PROCEDURE — 20611 DRAIN/INJ JOINT/BURSA W/US: CPT | Performed by: FAMILY MEDICINE

## 2022-05-02 RX ORDER — TRIAMCINOLONE ACETONIDE 40 MG/ML
80 INJECTION, SUSPENSION INTRA-ARTICULAR; INTRAMUSCULAR
Status: DISCONTINUED | OUTPATIENT
Start: 2022-05-02 | End: 2022-05-02 | Stop reason: HOSPADM

## 2022-05-02 RX ADMIN — TRIAMCINOLONE ACETONIDE 80 MG: 40 INJECTION, SUSPENSION INTRA-ARTICULAR; INTRAMUSCULAR at 08:56

## 2022-05-02 NOTE — PROGRESS NOTES
"- Large Joint Arthrocentesis: R glenohumeral on 5/2/2022 8:56 AM  Indications: pain  Details: 22 G (3.5\") needle, ultrasound-guided posterior approach  Medications: 80 mg triamcinolone acetonide 40 MG/ML  Outcome: tolerated well, no immediate complications  Procedure, treatment alternatives, risks and benefits explained, specific risks discussed. Consent was given by the patient. Immediately prior to procedure a time out was called to verify the correct patient, procedure, equipment, support staff and site/side marked as required. Patient was prepped and draped in the usual sterile fashion.           "

## 2022-06-15 ENCOUNTER — OFFICE VISIT (OUTPATIENT)
Dept: ORTHOPEDIC SURGERY | Facility: CLINIC | Age: 72
End: 2022-06-15

## 2022-06-15 VITALS — HEIGHT: 67 IN | BODY MASS INDEX: 24.33 KG/M2 | WEIGHT: 155 LBS

## 2022-06-15 DIAGNOSIS — M75.121 COMPLETE TEAR OF RIGHT ROTATOR CUFF, UNSPECIFIED WHETHER TRAUMATIC: ICD-10-CM

## 2022-06-15 DIAGNOSIS — M17.12 PRIMARY OSTEOARTHRITIS OF LEFT KNEE: ICD-10-CM

## 2022-06-15 DIAGNOSIS — Z98.890 STATUS POST ARTHROSCOPY OF SHOULDER: Primary | ICD-10-CM

## 2022-06-15 PROCEDURE — 20610 DRAIN/INJ JOINT/BURSA W/O US: CPT | Performed by: ORTHOPAEDIC SURGERY

## 2022-06-15 PROCEDURE — 99213 OFFICE O/P EST LOW 20 MIN: CPT | Performed by: ORTHOPAEDIC SURGERY

## 2022-06-15 RX ADMIN — TRIAMCINOLONE ACETONIDE 80 MG: 40 INJECTION, SUSPENSION INTRA-ARTICULAR; INTRAMUSCULAR at 11:54

## 2022-06-15 RX ADMIN — LIDOCAINE HYDROCHLORIDE 8 ML: 10 INJECTION, SOLUTION EPIDURAL; INFILTRATION; INTRACAUDAL; PERINEURAL at 11:54

## 2022-07-04 RX ORDER — LIDOCAINE HYDROCHLORIDE 10 MG/ML
8 INJECTION, SOLUTION EPIDURAL; INFILTRATION; INTRACAUDAL; PERINEURAL
Status: COMPLETED | OUTPATIENT
Start: 2022-06-15 | End: 2022-06-15

## 2022-07-04 RX ORDER — TRIAMCINOLONE ACETONIDE 40 MG/ML
80 INJECTION, SUSPENSION INTRA-ARTICULAR; INTRAMUSCULAR
Status: COMPLETED | OUTPATIENT
Start: 2022-06-15 | End: 2022-06-15

## 2022-07-25 ENCOUNTER — OFFICE VISIT (OUTPATIENT)
Dept: ORTHOPEDIC SURGERY | Facility: CLINIC | Age: 72
End: 2022-07-25

## 2022-07-25 VITALS — HEIGHT: 67 IN | BODY MASS INDEX: 24.33 KG/M2 | WEIGHT: 155 LBS

## 2022-07-25 DIAGNOSIS — M75.121 COMPLETE TEAR OF RIGHT ROTATOR CUFF, UNSPECIFIED WHETHER TRAUMATIC: ICD-10-CM

## 2022-07-25 DIAGNOSIS — M17.12 PRIMARY OSTEOARTHRITIS OF LEFT KNEE: ICD-10-CM

## 2022-07-25 DIAGNOSIS — M19.011 PRIMARY OSTEOARTHRITIS OF RIGHT SHOULDER: ICD-10-CM

## 2022-07-25 DIAGNOSIS — Z98.890 STATUS POST ARTHROSCOPY OF SHOULDER: Primary | ICD-10-CM

## 2022-07-25 PROCEDURE — 99214 OFFICE O/P EST MOD 30 MIN: CPT | Performed by: ORTHOPAEDIC SURGERY

## 2022-07-25 NOTE — PROGRESS NOTES
Subjective:     Patient ID: Mason Angel is a 71 y.o. male.    Chief Complaint:  Follow-up right shoulder pain, status post right shoulder rotator cuff repair and biceps tenodesis-6/25/2021  Recurrent supraspinatus tear with 2 cm retraction and atrophy  Plan last visit-continue home exercises, observation status post ultrasound-guided injection    History of Present Illness  Mason Angel returns to clinic today for evaluation of right shoulder pain.    The patient complains of persistent pain and weakness to his neck and right shoulder. He has been experiencing worsening pain for approximately 2 weeks. He rates his pain as a 10 out of 10. He localizes his pain to the anterolateral aspect of his right shoulder, without radiation down his right upper extremity. He denies numbness or tingling. He had an ultrasound-guided injection performed on 05/02/2022.    The patient denies a history of heart issues, blood clots, or diabetes.     He has a history of a nerve block.     The patient received a cortisone injection to his left knee in 06/2022, by LUANA Chung, with significant improvement.     Social History     Occupational History   • Not on file   Tobacco Use   • Smoking status: Never Smoker   • Smokeless tobacco: Never Used   Vaping Use   • Vaping Use: Never used   Substance and Sexual Activity   • Alcohol use: Yes     Comment: a few a week   • Drug use: Never   • Sexual activity: Defer      Past Medical History:   Diagnosis Date   • GERD (gastroesophageal reflux disease)    • Hyperlipidemia    • Stroke (HCC)     3/2014     Past Surgical History:   Procedure Laterality Date   • APPENDECTOMY     • BICEPS TENDONESIS SUBPECTORALIS REPAIR Right 6/25/2021    Procedure: BICEPS TENDONESIS SUBPECTORALIS MINI OPEN REPAIR;  Surgeon: Shalom Montaño MD;  Location: [facility];  Service: Orthopedics;  Laterality: Right;   • FOOT SURGERY Left     x2   • LAPAROSCOPIC CHOLECYSTECTOMY     • SHOULDER ARTHROSCOPY W/  "ROTATOR CUFF REPAIR Right 6/25/2021    Procedure: SHOULDER ARTHROSCOPY WITH ROTATOR CUFF REPAIR;  Surgeon: Shalom Montaño MD;  [facility];  Service: Orthopedics;  Laterality: Right;  RIGHT SHOULDER ARTHROSCOPY WITH ROTATOR CUFF REPAIR       Family History   Problem Relation Age of Onset   • Cancer Mother          Review of Systems        Objective:  Vitals:    07/25/22 1113   Weight: 70.3 kg (155 lb)   Height: 170.2 cm (67.01\")         07/25/22  1113   Weight: 70.3 kg (155 lb)     Body mass index is 24.27 kg/m².  Physical Exam    Vital signs reviewed.   General: No acute distress, alert and oriented  Eyes: conjunctiva clear; pupils equally round and reactive  ENT: external ears and nose atraumatic; oropharynx clear  CV: no peripheral edema  Resp: normal respiratory effort  Skin: no rashes or wounds; normal turgor  Psych: mood and affect appropriate; recent and remote memory intact          Ortho Exam     Right shoulder-  All prior incisions well-healed.  FF-Active-150 degrees  Passive-160 degrees  Strength-4-/5  ER-Active-45 degrees  Strength-4/5  Belly press test strength-4+/5  Empty can test-Positive  Neer sign-Positive  Cuenca-Positive  Cross arm test-Positive  Tenderness over AC joint-Positive    Imaging:  None today  Assessment:        1. Status post arthroscopy of shoulder    2. Complete tear of right rotator cuff, unspecified whether traumatic    3. Primary osteoarthritis of right shoulder    4. Primary osteoarthritis of left knee           Plan:          1. Discussed treatment options at length with patient at today's visit.   2. At this point in time, given his persistence of pain and weakness to his right shoulder, we discussed options for injection, which has only given him short term limited relief, attempted revision rotator cuff repair, and reverse shoulder arthroplasty. After reviewing risks, benefits, alternatives to each of these options, he wished to proceed with right shoulder " scope, revision rotator cuff repair, and all associated procedures.  3. Plan will be for right shoulder arthroscopy, rotator cuff debridement versus repair, subacromial decompression, right distal clavicle excision and all associated procedures.  I reviewed risks benefits and alternatives the procedure with risks including not limited to neurovascular damage, bleeding, infection, chronic pain, re-tear rotator cuff, failure of healing rotator cuff, loss of motion, weakness, stiffness, instability, biceps sag, DVT, pulmonary embolus, death, stroke, complex regional pain syndrome, myocardial infarction, need for additional procedures. He understood all these had all questions answered.  Patient verbally consented to proceed with surgery.  No guarantees were given regarding results of surgery.  We will have patient medically optimized by primary care physician and proceed with surgery at next available date.  4. The patient denies a history of heart issues or diabetes.  5. I did order a viscosupplementation injection for the left knee given his end stage arthritis and prior significant improvement with this.      Mason Angel was in agreement with plan and had all questions answered.     Orders:  Orders Placed This Encounter   Procedures   • Visco Treatment       Medications:  No orders of the defined types were placed in this encounter.      Followup:  No follow-ups on file.    Diagnoses and all orders for this visit:    1. Status post arthroscopy of shoulder (Primary)    2. Complete tear of right rotator cuff, unspecified whether traumatic    3. Primary osteoarthritis of right shoulder    4. Primary osteoarthritis of left knee  -     Visco Treatment; Future          Dictated utilizing Dragon dictation     Transcribed from ambient dictation for Shalom Montaño MD by RENÉE JOSHI.  07/25/22   14:22 EDT    Patient verbalized consent to the visit recording.  I have personally performed the services described in  this document as transcribed by the above individual, and it is both accurate and complete.  Shalom Montaño MD  7/27/2022  19:13 EDT

## 2022-07-27 RX ORDER — MELOXICAM 7.5 MG/1
15 TABLET ORAL ONCE
Status: CANCELLED | OUTPATIENT
Start: 2022-07-27 | End: 2022-07-27

## 2022-07-27 RX ORDER — PREGABALIN 150 MG/1
150 CAPSULE ORAL ONCE
Status: CANCELLED | OUTPATIENT
Start: 2022-07-27 | End: 2022-07-27

## 2022-07-27 RX ORDER — ACETAMINOPHEN 325 MG/1
1000 TABLET ORAL ONCE
Status: CANCELLED | OUTPATIENT
Start: 2022-07-27 | End: 2022-07-27

## 2022-08-25 ENCOUNTER — PRE-ADMISSION TESTING (OUTPATIENT)
Dept: PREADMISSION TESTING | Facility: HOSPITAL | Age: 72
End: 2022-08-25

## 2022-08-25 VITALS
HEIGHT: 67 IN | HEART RATE: 75 BPM | SYSTOLIC BLOOD PRESSURE: 150 MMHG | RESPIRATION RATE: 16 BRPM | BODY MASS INDEX: 24.34 KG/M2 | OXYGEN SATURATION: 97 % | DIASTOLIC BLOOD PRESSURE: 83 MMHG | WEIGHT: 155.1 LBS

## 2022-08-25 DIAGNOSIS — M75.121 COMPLETE TEAR OF RIGHT ROTATOR CUFF, UNSPECIFIED WHETHER TRAUMATIC: ICD-10-CM

## 2022-08-25 LAB
ANION GAP SERPL CALCULATED.3IONS-SCNC: 9.4 MMOL/L (ref 5–15)
APTT PPP: 26.3 SECONDS (ref 24.3–38.1)
BASOPHILS # BLD AUTO: 0.03 10*3/MM3 (ref 0–0.2)
BASOPHILS NFR BLD AUTO: 0.4 % (ref 0–1.5)
BUN SERPL-MCNC: 16 MG/DL (ref 8–23)
BUN/CREAT SERPL: 21.3 (ref 7–25)
CALCIUM SPEC-SCNC: 8.8 MG/DL (ref 8.6–10.5)
CHLORIDE SERPL-SCNC: 105 MMOL/L (ref 98–107)
CO2 SERPL-SCNC: 25.6 MMOL/L (ref 22–29)
CREAT SERPL-MCNC: 0.75 MG/DL (ref 0.76–1.27)
DEPRECATED RDW RBC AUTO: 45.7 FL (ref 37–54)
EGFRCR SERPLBLD CKD-EPI 2021: 97.7 ML/MIN/1.73
EOSINOPHIL # BLD AUTO: 0.29 10*3/MM3 (ref 0–0.4)
EOSINOPHIL NFR BLD AUTO: 3.5 % (ref 0.3–6.2)
ERYTHROCYTE [DISTWIDTH] IN BLOOD BY AUTOMATED COUNT: 12.4 % (ref 12.3–15.4)
GLUCOSE SERPL-MCNC: 103 MG/DL (ref 65–99)
HBA1C MFR BLD: 5.5 % (ref 4.8–5.6)
HCT VFR BLD AUTO: 47.3 % (ref 37.5–51)
HGB BLD-MCNC: 16 G/DL (ref 13–17.7)
IMM GRANULOCYTES # BLD AUTO: 0.04 10*3/MM3 (ref 0–0.05)
IMM GRANULOCYTES NFR BLD AUTO: 0.5 % (ref 0–0.5)
INR PPP: 0.94 (ref 0.9–1.1)
LYMPHOCYTES # BLD AUTO: 2.07 10*3/MM3 (ref 0.7–3.1)
LYMPHOCYTES NFR BLD AUTO: 25.1 % (ref 19.6–45.3)
MCH RBC QN AUTO: 33.7 PG (ref 26.6–33)
MCHC RBC AUTO-ENTMCNC: 33.8 G/DL (ref 31.5–35.7)
MCV RBC AUTO: 99.6 FL (ref 79–97)
MONOCYTES # BLD AUTO: 0.65 10*3/MM3 (ref 0.1–0.9)
MONOCYTES NFR BLD AUTO: 7.9 % (ref 5–12)
NEUTROPHILS NFR BLD AUTO: 5.17 10*3/MM3 (ref 1.7–7)
NEUTROPHILS NFR BLD AUTO: 62.6 % (ref 42.7–76)
NRBC BLD AUTO-RTO: 0 /100 WBC (ref 0–0.2)
PLATELET # BLD AUTO: 231 10*3/MM3 (ref 140–450)
PMV BLD AUTO: 9.6 FL (ref 6–12)
POTASSIUM SERPL-SCNC: 3.9 MMOL/L (ref 3.5–5.2)
PROTHROMBIN TIME: 12.6 SECONDS (ref 12.1–15)
QT INTERVAL: 364 MS
RBC # BLD AUTO: 4.75 10*6/MM3 (ref 4.14–5.8)
SODIUM SERPL-SCNC: 140 MMOL/L (ref 136–145)
WBC NRBC COR # BLD: 8.25 10*3/MM3 (ref 3.4–10.8)

## 2022-08-25 PROCEDURE — 85610 PROTHROMBIN TIME: CPT | Performed by: ORTHOPAEDIC SURGERY

## 2022-08-25 PROCEDURE — 83036 HEMOGLOBIN GLYCOSYLATED A1C: CPT | Performed by: ORTHOPAEDIC SURGERY

## 2022-08-25 PROCEDURE — 93010 ELECTROCARDIOGRAM REPORT: CPT | Performed by: INTERNAL MEDICINE

## 2022-08-25 PROCEDURE — 80048 BASIC METABOLIC PNL TOTAL CA: CPT | Performed by: ORTHOPAEDIC SURGERY

## 2022-08-25 PROCEDURE — 93005 ELECTROCARDIOGRAM TRACING: CPT

## 2022-08-25 PROCEDURE — 36415 COLL VENOUS BLD VENIPUNCTURE: CPT

## 2022-08-25 PROCEDURE — 85025 COMPLETE CBC W/AUTO DIFF WBC: CPT | Performed by: ORTHOPAEDIC SURGERY

## 2022-08-25 PROCEDURE — 85730 THROMBOPLASTIN TIME PARTIAL: CPT | Performed by: ORTHOPAEDIC SURGERY

## 2022-08-25 RX ORDER — HYDROCODONE BITARTRATE AND ACETAMINOPHEN 10; 325 MG/1; MG/1
1 TABLET ORAL EVERY 8 HOURS PRN
COMMUNITY
End: 2022-09-09 | Stop reason: HOSPADM

## 2022-08-25 NOTE — PAT
Pt here for PAT visit.  Pre-op tests completed, chg soap given, and instructions reviewed.  Instructed clears until 2 hrs prior to arrival time, voiced understanding. Seeing PCP for medical clearance.

## 2022-08-25 NOTE — DISCHARGE INSTRUCTIONS
PRE-ADMISSION TESTING INSTRUCTIONS FOR ADULTS    Take these medications the morning of surgery with a small sip of water:  ok to take hydrocodone if needed      Do not take any insulin or diabetes medications the morning of surgery.      No aspirin, advil, aleve, ibuprofen, naproxen, diet pills, decongestants, or herbal/vitamins for a week prior to surgery.   Tylenol/Acetaminophen is okay to take if needed.    General Instructions:    DO NOT EAT SOLID FOOD AFTER MIDNIGHT THE NIGHT BEFORE SURGERY. No gum, mints, or hard candy after midnight the night before surgery.  You may drink clear liquids the day of surgery up until 2 hours before your arrival time.  Clear liquids are liquids you can see through. Nothing RED in color.    Plain water    Sports drinks  Sodas     Gelatin (Jell-O)  Fruit juices without pulp such as white grape juice and apple juice  Popsicles that contain no fruit or yogurt  Tea or coffee (no cream or milk added)    It is beneficial for you to have a clear drink that contains carbohydrates just before you leave your house and before your fasting time begins.  We suggest a 20 ounce bottle of Gatorade or Powerade for non-diabetic patients or a 20 ounce bottle of G2 or Powerade Zero for diabetic patients.     Patients who avoid smoking, chewing tobacco and alcohol for 4 weeks prior to surgery have a reduced risk of post-operative complications.  If at all possible, quit smoking as many days before surgery as you can.    Do not smoke, use chewing tobacco or drink alcohol the day of surgery    Bring your C-PAP/ BI-PAP machine if you use one.  Wear clean comfortable clothes and socks.  Do not wear contact lenses, lotion, deodorant, or make-up.  Bring a case for your glasses if applicable. You may brush your teeth the morning of surgery.  You may wear dentures/partials, do not put adhesive/glue on them.  Leave all other jewelry and valuables at home.      Preventing a Surgical Site Infection:    Shower  the night before and on the morning of surgery using the chlorhexidine soap you were given.  Use a clean washcloth with the soap.  Place clean sheets on your bed after showering the night before surgery. Do not use the CHG soap on your hair, face, or private areas. Wash your body gently for five (5) minutes. Do not scrub your skin.  Dry with a clean towel and dress in clean clothing.  Do not shave the surgical area for 10 days-2 weeks prior to surgery  because the razor can irritate skin and make it easier to develop an infection.  Make sure you, your family, and all healthcare providers clean their hands with soap and water or an alcohol based hand  before caring for you or your wound.      Day of surgery:    Your surgeon’s office will advise you of your arrival time for the day of surgery.    Upon arrival, a Pre-op nurse and Anesthesia provider will review your health history, obtain vital signs, and answer questions you may have.  The only belongings needed at this time will be your home medications and if applicable your C-PAP/BI-PAP machine.  If you are staying overnight your family can leave the rest of your belongings in the car and bring them to your room later.  A Pre-op nurse will start an IV and you may receive medication in preparation for surgery, including something to help you relax.  Your family will be able to see you in the Pre-op area.  While you are in surgery your family should notify the waiting room  if they leave the waiting room area and provide a contact phone number.    IF you have any questions, you can call the Pre-Admission Department at [phone] or your surgeon's office.  Notify your surgeon if  you become sick, have a fever, productive cough, or cannot be here the day of surgery    Please be aware that surgery does come with discomfort.  We want to make every effort to control your discomfort so please discuss any uncontrolled symptoms with your nurse.    Your doctor will most likely have prescribed pain medications.      If you are going home after surgery, you will receive individualized written care instructions before being discharged.  A responsible adult (over the age of 18) must drive you to and from the hospital on the day of your surgery and stay with you for 24 hours after anesthesia.    If you are staying overnight following surgery, you will be transported to your hospital room following the recovery period.  [facility] has all private rooms.    You may receive a survey regarding the care you received. Your feedback is very important and will be used to collect the necessary data to help us to continue to provide excellent care.     Deductibles and co-payments are collected on the day of service. Please be prepared to pay the required co-pay, deductible or deposit on the day of service as defined by your plan.

## 2022-09-07 ENCOUNTER — APPOINTMENT (OUTPATIENT)
Dept: LAB | Facility: HOSPITAL | Age: 72
End: 2022-09-07

## 2022-09-08 ENCOUNTER — ANESTHESIA EVENT (OUTPATIENT)
Dept: PERIOP | Facility: HOSPITAL | Age: 72
End: 2022-09-08

## 2022-09-09 ENCOUNTER — HOSPITAL ENCOUNTER (OUTPATIENT)
Facility: HOSPITAL | Age: 72
Setting detail: HOSPITAL OUTPATIENT SURGERY
Discharge: HOME | End: 2022-09-09
Attending: ORTHOPAEDIC SURGERY | Admitting: ORTHOPAEDIC SURGERY

## 2022-09-09 ENCOUNTER — ANESTHESIA (OUTPATIENT)
Dept: PERIOP | Facility: HOSPITAL | Age: 72
End: 2022-09-09

## 2022-09-09 VITALS
TEMPERATURE: 97.8 F | BODY MASS INDEX: 23.84 KG/M2 | WEIGHT: 152.2 LBS | SYSTOLIC BLOOD PRESSURE: 119 MMHG | HEART RATE: 71 BPM | RESPIRATION RATE: 16 BRPM | DIASTOLIC BLOOD PRESSURE: 72 MMHG | OXYGEN SATURATION: 93 %

## 2022-09-09 DIAGNOSIS — M75.121 COMPLETE TEAR OF RIGHT ROTATOR CUFF, UNSPECIFIED WHETHER TRAUMATIC: ICD-10-CM

## 2022-09-09 PROCEDURE — C1713 ANCHOR/SCREW BN/BN,TIS/BN: HCPCS | Performed by: ORTHOPAEDIC SURGERY

## 2022-09-09 PROCEDURE — 29827 SHO ARTHRS SRG RT8TR CUF RPR: CPT | Performed by: ORTHOPAEDIC SURGERY

## 2022-09-09 PROCEDURE — 76942 ECHO GUIDE FOR BIOPSY: CPT | Performed by: ORTHOPAEDIC SURGERY

## 2022-09-09 PROCEDURE — 29824 SHO ARTHRS SRG DSTL CLAVICLC: CPT | Performed by: ORTHOPAEDIC SURGERY

## 2022-09-09 PROCEDURE — 29823 SHO ARTHRS SRG XTNSV DBRDMT: CPT | Performed by: SPECIALIST/TECHNOLOGIST, OTHER

## 2022-09-09 PROCEDURE — 25010000002 PROPOFOL 200 MG/20ML EMULSION: Performed by: NURSE ANESTHETIST, CERTIFIED REGISTERED

## 2022-09-09 PROCEDURE — 29827 SHO ARTHRS SRG RT8TR CUF RPR: CPT | Performed by: SPECIALIST/TECHNOLOGIST, OTHER

## 2022-09-09 PROCEDURE — 25010000002 MIDAZOLAM PER 1MG: Performed by: NURSE ANESTHETIST, CERTIFIED REGISTERED

## 2022-09-09 PROCEDURE — 0 CEFAZOLIN SODIUM-DEXTROSE 2-3 GM-%(50ML) RECONSTITUTED SOLUTION: Performed by: ORTHOPAEDIC SURGERY

## 2022-09-09 PROCEDURE — C1763 CONN TISS, NON-HUMAN: HCPCS | Performed by: ORTHOPAEDIC SURGERY

## 2022-09-09 PROCEDURE — 25010000002 DEXAMETHASONE PER 1 MG: Performed by: NURSE ANESTHETIST, CERTIFIED REGISTERED

## 2022-09-09 PROCEDURE — 25010000002 ONDANSETRON PER 1 MG: Performed by: NURSE ANESTHETIST, CERTIFIED REGISTERED

## 2022-09-09 PROCEDURE — 25010000002 EPINEPHRINE PER 0.1 MG: Performed by: ORTHOPAEDIC SURGERY

## 2022-09-09 PROCEDURE — 29824 SHO ARTHRS SRG DSTL CLAVICLC: CPT | Performed by: SPECIALIST/TECHNOLOGIST, OTHER

## 2022-09-09 PROCEDURE — 29823 SHO ARTHRS SRG XTNSV DBRDMT: CPT | Performed by: ORTHOPAEDIC SURGERY

## 2022-09-09 DEVICE — HEALICOIL PK 5.5 MM SUTURE ANCHOR                                    WITH THREE ULTRABRAID NO.2 SUTURES                                    BLUE, BLUE-COBRAID, COBRAID-BLACK STERILE
Type: IMPLANTABLE DEVICE | Site: SHOULDER | Status: FUNCTIONAL
Brand: HEALICOIL

## 2022-09-09 DEVICE — ANCHR TNDN REGENETEN TISS/SFT EA/8: Type: IMPLANTABLE DEVICE | Site: SHOULDER | Status: FUNCTIONAL

## 2022-09-09 DEVICE — ULTRATAPE 2MM BLUE 38 PKG OF 6: Type: IMPLANTABLE DEVICE | Site: SHOULDER | Status: FUNCTIONAL

## 2022-09-09 DEVICE — ULTRATAPE SUTURE COBRAID BLUE, 6                                    PER BOX
Type: IMPLANTABLE DEVICE | Site: SHOULDER | Status: FUNCTIONAL
Brand: ULTRATAPE

## 2022-09-09 RX ORDER — ONDANSETRON 2 MG/ML
4 INJECTION INTRAMUSCULAR; INTRAVENOUS ONCE AS NEEDED
Status: DISCONTINUED | OUTPATIENT
Start: 2022-09-09 | End: 2022-09-09 | Stop reason: HOSPADM

## 2022-09-09 RX ORDER — SODIUM CHLORIDE 0.9 % (FLUSH) 0.9 %
10 SYRINGE (ML) INJECTION AS NEEDED
Status: DISCONTINUED | OUTPATIENT
Start: 2022-09-09 | End: 2022-09-09 | Stop reason: HOSPADM

## 2022-09-09 RX ORDER — PREGABALIN 75 MG/1
150 CAPSULE ORAL ONCE
Status: COMPLETED | OUTPATIENT
Start: 2022-09-09 | End: 2022-09-09

## 2022-09-09 RX ORDER — DEXMEDETOMIDINE HYDROCHLORIDE 100 UG/ML
INJECTION, SOLUTION INTRAVENOUS AS NEEDED
Status: DISCONTINUED | OUTPATIENT
Start: 2022-09-09 | End: 2022-09-09 | Stop reason: SURG

## 2022-09-09 RX ORDER — SODIUM CHLORIDE, SODIUM LACTATE, POTASSIUM CHLORIDE, CALCIUM CHLORIDE 600; 310; 30; 20 MG/100ML; MG/100ML; MG/100ML; MG/100ML
9 INJECTION, SOLUTION INTRAVENOUS CONTINUOUS PRN
Status: DISCONTINUED | OUTPATIENT
Start: 2022-09-09 | End: 2022-09-09 | Stop reason: HOSPADM

## 2022-09-09 RX ORDER — CEFAZOLIN SODIUM 2 G/50ML
2 SOLUTION INTRAVENOUS ONCE
Status: COMPLETED | OUTPATIENT
Start: 2022-09-09 | End: 2022-09-09

## 2022-09-09 RX ORDER — OXYCODONE HYDROCHLORIDE AND ACETAMINOPHEN 5; 325 MG/1; MG/1
1 TABLET ORAL EVERY 4 HOURS PRN
Qty: 42 TABLET | Refills: 0 | Status: SHIPPED | OUTPATIENT
Start: 2022-09-09 | End: 2022-10-10 | Stop reason: SDUPTHER

## 2022-09-09 RX ORDER — SODIUM CHLORIDE 0.9 % (FLUSH) 0.9 %
10 SYRINGE (ML) INJECTION EVERY 12 HOURS SCHEDULED
Status: DISCONTINUED | OUTPATIENT
Start: 2022-09-09 | End: 2022-09-09 | Stop reason: HOSPADM

## 2022-09-09 RX ORDER — FAMOTIDINE 10 MG/ML
20 INJECTION, SOLUTION INTRAVENOUS
Status: COMPLETED | OUTPATIENT
Start: 2022-09-09 | End: 2022-09-09

## 2022-09-09 RX ORDER — LIDOCAINE HYDROCHLORIDE 20 MG/ML
INJECTION, SOLUTION INFILTRATION; PERINEURAL AS NEEDED
Status: DISCONTINUED | OUTPATIENT
Start: 2022-09-09 | End: 2022-09-09 | Stop reason: SURG

## 2022-09-09 RX ORDER — LIDOCAINE HYDROCHLORIDE AND EPINEPHRINE BITARTRATE 20; .01 MG/ML; MG/ML
INJECTION, SOLUTION SUBCUTANEOUS AS NEEDED
Status: DISCONTINUED | OUTPATIENT
Start: 2022-09-09 | End: 2022-09-09 | Stop reason: HOSPADM

## 2022-09-09 RX ORDER — MAGNESIUM HYDROXIDE 1200 MG/15ML
LIQUID ORAL AS NEEDED
Status: DISCONTINUED | OUTPATIENT
Start: 2022-09-09 | End: 2022-09-09 | Stop reason: HOSPADM

## 2022-09-09 RX ORDER — SODIUM CHLORIDE, SODIUM LACTATE, POTASSIUM CHLORIDE, CALCIUM CHLORIDE 600; 310; 30; 20 MG/100ML; MG/100ML; MG/100ML; MG/100ML
100 INJECTION, SOLUTION INTRAVENOUS CONTINUOUS
Status: DISCONTINUED | OUTPATIENT
Start: 2022-09-09 | End: 2022-09-09 | Stop reason: HOSPADM

## 2022-09-09 RX ORDER — BUPIVACAINE HYDROCHLORIDE 5 MG/ML
INJECTION, SOLUTION EPIDURAL; INTRACAUDAL
Status: COMPLETED | OUTPATIENT
Start: 2022-09-09 | End: 2022-09-09

## 2022-09-09 RX ORDER — MELOXICAM 7.5 MG/1
15 TABLET ORAL ONCE
Status: COMPLETED | OUTPATIENT
Start: 2022-09-09 | End: 2022-09-09

## 2022-09-09 RX ORDER — MIDAZOLAM HYDROCHLORIDE 2 MG/2ML
0.5 INJECTION, SOLUTION INTRAMUSCULAR; INTRAVENOUS
Status: DISCONTINUED | OUTPATIENT
Start: 2022-09-09 | End: 2022-09-09 | Stop reason: HOSPADM

## 2022-09-09 RX ORDER — ACETAMINOPHEN 500 MG
1000 TABLET ORAL ONCE
Status: COMPLETED | OUTPATIENT
Start: 2022-09-09 | End: 2022-09-09

## 2022-09-09 RX ORDER — SENNA PLUS 8.6 MG/1
1 TABLET ORAL NIGHTLY
Qty: 20 TABLET | Refills: 0 | Status: SHIPPED | OUTPATIENT
Start: 2022-09-09 | End: 2022-09-16

## 2022-09-09 RX ORDER — ONDANSETRON 4 MG/1
4 TABLET, FILM COATED ORAL EVERY 8 HOURS PRN
Qty: 30 TABLET | Refills: 0 | Status: SHIPPED | OUTPATIENT
Start: 2022-09-09 | End: 2022-09-16

## 2022-09-09 RX ORDER — SODIUM CHLORIDE 9 MG/ML
40 INJECTION, SOLUTION INTRAVENOUS AS NEEDED
Status: DISCONTINUED | OUTPATIENT
Start: 2022-09-09 | End: 2022-09-09 | Stop reason: HOSPADM

## 2022-09-09 RX ORDER — ONDANSETRON 2 MG/ML
4 INJECTION INTRAMUSCULAR; INTRAVENOUS ONCE AS NEEDED
Status: COMPLETED | OUTPATIENT
Start: 2022-09-09 | End: 2022-09-09

## 2022-09-09 RX ORDER — DEXAMETHASONE SODIUM PHOSPHATE 4 MG/ML
4 INJECTION, SOLUTION INTRA-ARTICULAR; INTRALESIONAL; INTRAMUSCULAR; INTRAVENOUS; SOFT TISSUE ONCE AS NEEDED
Status: COMPLETED | OUTPATIENT
Start: 2022-09-09 | End: 2022-09-09

## 2022-09-09 RX ORDER — PROPOFOL 10 MG/ML
INJECTION, EMULSION INTRAVENOUS AS NEEDED
Status: DISCONTINUED | OUTPATIENT
Start: 2022-09-09 | End: 2022-09-09 | Stop reason: SURG

## 2022-09-09 RX ORDER — LIDOCAINE HYDROCHLORIDE 10 MG/ML
0.5 INJECTION, SOLUTION EPIDURAL; INFILTRATION; INTRACAUDAL; PERINEURAL ONCE AS NEEDED
Status: DISCONTINUED | OUTPATIENT
Start: 2022-09-09 | End: 2022-09-09 | Stop reason: HOSPADM

## 2022-09-09 RX ADMIN — LIDOCAINE HYDROCHLORIDE 1 MG: 20 INJECTION, SOLUTION INFILTRATION; PERINEURAL at 08:54

## 2022-09-09 RX ADMIN — BUPIVACAINE HYDROCHLORIDE 15 ML: 5 INJECTION, SOLUTION EPIDURAL; INTRACAUDAL; PERINEURAL at 08:54

## 2022-09-09 RX ADMIN — PREGABALIN 150 MG: 75 CAPSULE ORAL at 07:42

## 2022-09-09 RX ADMIN — ACETAMINOPHEN 1000 MG: 500 TABLET, FILM COATED ORAL at 07:42

## 2022-09-09 RX ADMIN — MELOXICAM 15 MG: 7.5 TABLET ORAL at 07:42

## 2022-09-09 RX ADMIN — ONDANSETRON 4 MG: 2 INJECTION INTRAMUSCULAR; INTRAVENOUS at 07:42

## 2022-09-09 RX ADMIN — SODIUM CHLORIDE, POTASSIUM CHLORIDE, SODIUM LACTATE AND CALCIUM CHLORIDE 9 ML/HR: 600; 310; 30; 20 INJECTION, SOLUTION INTRAVENOUS at 07:43

## 2022-09-09 RX ADMIN — DEXMEDETOMIDINE 20 MCG: 100 INJECTION, SOLUTION, CONCENTRATE INTRAVENOUS at 08:54

## 2022-09-09 RX ADMIN — DEXAMETHASONE SODIUM PHOSPHATE 4 MG: 4 INJECTION, SOLUTION INTRAMUSCULAR; INTRAVENOUS at 07:43

## 2022-09-09 RX ADMIN — CEFAZOLIN SODIUM 2 G: 2 SOLUTION INTRAVENOUS at 10:28

## 2022-09-09 RX ADMIN — FAMOTIDINE 20 MG: 10 INJECTION, SOLUTION INTRAVENOUS at 07:43

## 2022-09-09 RX ADMIN — PROPOFOL 200 MG: 10 INJECTION, EMULSION INTRAVENOUS at 10:18

## 2022-09-09 RX ADMIN — LIDOCAINE HYDROCHLORIDE 100 MG: 20 INJECTION, SOLUTION INFILTRATION; PERINEURAL at 10:13

## 2022-09-09 RX ADMIN — MIDAZOLAM HYDROCHLORIDE 0.5 MG: 1 INJECTION, SOLUTION INTRAMUSCULAR; INTRAVENOUS at 08:48

## 2022-09-09 NOTE — BRIEF OP NOTE
SHOULDER ARTHROSCOPY WITH ROTATOR CUFF REPAIR COMPLEX  Progress Note    Mason Angel  9/9/2022    Pre-op Diagnosis:   Complete tear of right rotator cuff, unspecified whether traumatic [M75.121]       Post-Op Diagnosis Codes:     * Complete tear of right rotator cuff, unspecified whether traumatic [M75.121]    Procedure/CPT® Codes:         Procedure(s):  SHOULDER ARTHROSCOPY WITH ROTATOR CUFF,  distal clavicle excision    Surgeon(s):  Shalom Montaño MD    Anesthesia: General with Block    Staff:   Circulator: Ellen Robert RN; Anita Villalba RN  Scrub Person: Evonne Patten  Assistant: Dennis Sagastume CSA  Other: Raysa Hernández  Assistant: Dennis Sagastume CSA      Estimated Blood Loss: minimal    Urine Voided: * No values recorded between 9/9/2022 10:00 AM and 9/9/2022 12:20 PM *    Specimens:                None          Drains: * No LDAs found *    Findings: rotator cuff tear, AC DJD        Complications: none    Assistant: Dennis Sagastume CSA  was responsible for performing the following activities: Retraction, Irrigation and Closing and their skilled assistance was necessary for the success of this case.    Shalom Montaño MD     Date: 9/9/2022  Time: 12:25 EDT

## 2022-09-09 NOTE — ANESTHESIA PREPROCEDURE EVALUATION
Anesthesia Evaluation     Patient summary reviewed and Nursing notes reviewed   no history of anesthetic complications:  NPO Solid Status: > 8 hours  NPO Liquid Status: > 4 hours           Airway   Mallampati: II  TM distance: >3 FB  Neck ROM: full  No difficulty expected  Dental      Pulmonary - negative pulmonary ROS    breath sounds clear to auscultation  Cardiovascular   Exercise tolerance: good (4-7 METS)    ECG reviewed  PT is on anticoagulation therapy  Rhythm: regular  Rate: normal    (+) hyperlipidemia,     ROS comment: Narrative & Impression      HEART RATE= 79  bpm  RR Interval= 760  ms  IA Interval= 152  ms  P Horizontal Axis= 19  deg  P Front Axis= 34  deg  QRSD Interval= 91  ms  QT Interval= 364  ms  QRS Axis= -15  deg  T Wave Axis= 23  deg  - OTHERWISE NORMAL ECG -  Sinus rhythm  Borderline left axis deviation  When compared with ECG of 16-Jun-2021 10:10:57,  No significant change  Electronically Signed By: Blake Saldivar ([facility]) 25-Aug-2022 12:21:26  Date and Time of Study: 2022-08-25 09:17:50    Specimen Collected: 08/25/22 09:17 Last Resulted: 08/25/22 12:21            Neuro/Psych  (+) CVA (2014, left sided weakness ) residual symptoms, weakness (left side ), numbness (left foot ),    GI/Hepatic/Renal/Endo    (+)  GERD (pt states prn meds based on what he eats ),      Musculoskeletal     Abdominal    Substance History   (+) alcohol use (1-2 beers per day),      OB/GYN          Other   arthritis,                      Anesthesia Plan    ASA 2     general with block     intravenous induction     Anesthetic plan, risks, benefits, and alternatives have been provided, discussed and informed consent has been obtained with: patient.    Use of blood products discussed with patient  Consented to blood products.       CODE STATUS:

## 2022-09-09 NOTE — ANESTHESIA POSTPROCEDURE EVALUATION
Patient: Mason Angel    Procedure Summary     Date: 09/09/22 Room / Location:  [facility] 2 / [facility]    Anesthesia Start: 1001 Anesthesia Stop: 1227    Procedure: SHOULDER ARTHROSCOPY WITH ROTATOR CUFF,  distal clavicle excision (Right Shoulder) Diagnosis:       Complete tear of right rotator cuff, unspecified whether traumatic      (Complete tear of right rotator cuff, unspecified whether traumatic [M75.121])    Surgeons: Shalom Montaño MD Provider: Renetta Camara CRNA    Anesthesia Type: general with block ASA Status: 2          Anesthesia Type: general with block    Vitals  Vitals Value Taken Time   /70 09/09/22 1310   Temp 98 °F (36.7 °C) 09/09/22 1223   Pulse 80 09/09/22 1311   Resp 20 09/09/22 1310   SpO2 92 % 09/09/22 1311   Vitals shown include unvalidated device data.        Post Anesthesia Care and Evaluation      Comments: Discharged prior to anesthesia evaluation

## 2022-09-09 NOTE — ANESTHESIA PROCEDURE NOTES
Airway  Urgency: elective    Date/Time: 9/9/2022 10:19 AM  End Time:9/9/2022 10:19 AM  Airway not difficult    General Information and Staff    Patient location during procedure: OR  CRNA/CAA: Renetta Camara CRNA    Indications and Patient Condition    Preoxygenated: yes  MILS maintained throughout  Mask difficulty assessment: 0 - not attempted    Final Airway Details  Final airway type: supraglottic airway      Successful airway: unique  Size 4    Number of attempts at approach: 1  Assessment: lips, teeth, and gum same as pre-op and atraumatic intubation

## 2022-09-09 NOTE — ANESTHESIA PROCEDURE NOTES
Peripheral Block      Patient reassessed immediately prior to procedure    Patient location during procedure: pre-op  Stop time: 9/9/2022 8:54 AM  Reason for block: procedure for pain, at surgeon's request and post-op pain management  Performed by  JOSELIN/CAA: Renetta Camara CRNA  Preanesthetic Checklist  Completed: patient identified, IV checked, site marked, risks and benefits discussed, surgical consent, monitors and equipment checked, pre-op evaluation and timeout performed  Prep:  Pt Position: supine  Sterile barriers:cap, gloves, mask and washed/disinfected hands  Prep: ChloraPrep  Patient monitoring: blood pressure monitoring, continuous pulse oximetry and EKG  Procedure    Sedation: yes  Performed under: local infiltration  Guidance:ultrasound guided    ULTRASOUND INTERPRETATION. Using ultrasound guidance a gauge needle was placed in close proximity to the brachial plexus nerve, at which point, under ultrasound guidance anesthetic was injected in the area of the nerve and spread of the anesthesia was seen on ultrasound in close proximity thereto.  There were no abnormalities seen on ultrasound; a digital image was taken; and the patient tolerated the procedure with no complications. Images:still images obtained, printed/placed on chart    Laterality:right  Block Type:interscalene  Injection Technique:single-shot  Needle Type:echogenic  Needle Gauge:21 G  Resistance on Injection: none    Medications Used: bupivacaine PF (MARCAINE) injection 0.5%, 15 mL  Med administered at 9/9/2022 8:54 AM      Medications  Comment:precedex 20mcg added to block    Post Assessment  Injection Assessment: negative aspiration for heme, no paresthesia on injection and incremental injection  Patient Tolerance:comfortable throughout block  Complications:no  Additional Notes  Lidocaine 2% 1ml skin infiltration

## 2022-09-09 NOTE — H&P
History & Physical       Patient: Mason Angel    YOB: 1950    Medical Record Number: [id number]    Surgeon:  Dr. Shalom Montaño    Chief Complaints: Right shoulder rotator cuff recurrent tear, AC joint arthropathy      History of Present Illness: 71 y.o. male presents with recurrent right shoulder rotator cuff tear and AC joint arthropathy. Onset of symptoms was approximately 1 year ago and has been progressively worsening despite more conservative treatment measures.  Symptoms are associated with ability to move, lift, push, pull, and perform activities of daily living with affected extremity. Symptoms are aggravated by overhead motion, lifting, and pushing as well as ROM necessary for activities of daily living.   Symptoms improve with rest, ice and elevation only minimally.      Allergies:   Allergies   Allergen Reactions   • Pollen Extract Itching       Medications:   Home Medications:  No current facility-administered medications on file prior to encounter.     Current Outpatient Medications on File Prior to Encounter   Medication Sig   • aspirin 81 MG EC tablet Take 81 mg by mouth Daily.   • atorvastatin (LIPITOR) 40 MG tablet Take 40 mg by mouth Every Night.   • clobetasol (TEMOVATE) 0.05 % external solution Apply  topically to the appropriate area as directed As Needed (psoriasis).   • fexofenadine (ALLEGRA) 180 MG tablet Take 180 mg by mouth Daily As Needed (allergies).   • gabapentin (NEURONTIN) 300 MG capsule Take 300 mg by mouth every night at bedtime.   • triamcinolone (KENALOG) 0.1 % cream Apply 1 application topically to the appropriate area as directed As Needed (psoriasis).     Current Medications:  Scheduled Meds:acetaminophen, 1,000 mg, Oral, Once  ceFAZolin, 2 g, Intravenous, Once  famotidine, 20 mg, Intravenous, 60 Min Pre-Op  meloxicam, 15 mg, Oral, Once  pregabalin, 150 mg, Oral, Once  sodium chloride, 10 mL, Intravenous, Q12H      Continuous Infusions:lactated ringers,  9 mL/hr      PRN Meds:.dexamethasone  •  lactated ringers  •  lidocaine PF 1%  •  midazolam  •  ondansetron  •  sodium chloride  •  sodium chloride    I have reviewed the patient's medical history in detail and updated the computerized patient record.  Review and summarization of old records include:    Past Medical History:   Diagnosis Date   • GERD (gastroesophageal reflux disease)    • Hyperlipidemia    • Stroke (HCC) 03/2014    affected left side, limited strength left arm        Past Surgical History:   Procedure Laterality Date   • APPENDECTOMY     • BICEPS TENDONESIS SUBPECTORALIS REPAIR Right 06/25/2021    Procedure: BICEPS TENDONESIS SUBPECTORALIS MINI OPEN REPAIR;  Surgeon: Shalom Montaño MD;  Location: [facility] OR;  Service: Orthopedics;  Laterality: Right;   • FOOT SURGERY Left     x2 crush injury w/nerve damage   • LAPAROSCOPIC CHOLECYSTECTOMY     • NASAL SEPTUM SURGERY     • SHOULDER ARTHROSCOPY W/ ROTATOR CUFF REPAIR Right 06/25/2021    Procedure: SHOULDER ARTHROSCOPY WITH ROTATOR CUFF REPAIR;  Surgeon: Shalom Montaño MD;  Location:  [facility] OR;  Service: Orthopedics;  Laterality: Right;  RIGHT SHOULDER ARTHROSCOPY WITH ROTATOR CUFF REPAIR        Social History     Occupational History   • Not on file   Tobacco Use   • Smoking status: Never Smoker   • Smokeless tobacco: Never Used   Vaping Use   • Vaping Use: Never used   Substance and Sexual Activity   • Alcohol use: Yes     Comment: 3-4 sometimes a week   • Drug use: Never   • Sexual activity: Defer      Social History     Social History Narrative   • Not on file        Family History   Problem Relation Age of Onset   • Ovarian cancer Mother    • COPD Father    • Malig Hyperthermia Neg Hx        ROS: 14 point review of systems was performed and was negative except for documented findings in HPI and today's encounter.     Allergies:   Allergies   Allergen Reactions   • Pollen Extract Itching     Constitutional:  Denies fever, shaking or chills    Eyes:  Denies change in visual acuity   HENT:  Denies nasal congestion or sore throat   Respiratory:  Denies cough or shortness of breath   Cardiovascular:  Denies chest pain or severe LE edema   GI:  Denies abdominal pain, nausea, vomiting, bloody stools or diarrhea   Musculoskeletal:  Denies numbness tingling or loss of motor function except as outlined above in history of present illness.  : Denies painful urination or hematuria  Integument:  Denies rash, lesion or ulceration   Neurologic:  Denies headache or focal weakness  Endocrine:  Denies lymphadenopathy  Psych:  Denies confusion or change in mental status   Hem:  Denies active bleeding    Physical Exam: 71 y.o. male  There is no height or weight on file to calculate BMI.  There were no vitals filed for this visit.  Vital signs reviewed.   General Appearance:    Alert, cooperative, in no acute distress                  Eyes: conjunctiva clear  ENT: external ears and nose atraumatic  CV: no peripheral edema  Resp: normal respiratory effort  Skin: no rashes or wounds; normal turgor  Psych: mood and affect appropriate  Lymph: no nodes appreciated  Neuro: gross sensation intact  Vascular:  Palpable peripheral pulse in noted extremity  Musculoskeletal Extremities: Right shoulder-  All prior incisions well-healed.  FF-Active-150 degrees  Passive-160 degrees  Strength-4-/5  ER-Active-45 degrees  Strength-4/5  Belly press test strength-4+/5  Empty can test-Positive  Neer sign-Positive  Cuenca-Positive  Cross arm test-Positive  Tenderness over AC joint-Positive       Debilities/Disabilities Identified: None      Diagnostic Tests:  No visits with results within 2 Week(s) from this visit.   Latest known visit with results is:   Pre-Admission Testing on 08/25/2022   Component Date Value Ref Range Status   • PTT 08/25/2022 26.3  24.3 - 38.1 seconds Final   • Protime 08/25/2022 12.6  12.1 - 15.0 Seconds Final   • INR 08/25/2022 0.94  0.90 - 1.10 Final   • Glucose  08/25/2022 103 (A) 65 - 99 mg/dL Final   • BUN 08/25/2022 16  8 - 23 mg/dL Final   • Creatinine 08/25/2022 0.75 (A) 0.76 - 1.27 mg/dL Final   • Sodium 08/25/2022 140  136 - 145 mmol/L Final   • Potassium 08/25/2022 3.9  3.5 - 5.2 mmol/L Final   • Chloride 08/25/2022 105  98 - 107 mmol/L Final   • CO2 08/25/2022 25.6  22.0 - 29.0 mmol/L Final   • Calcium 08/25/2022 8.8  8.6 - 10.5 mg/dL Final   • BUN/Creatinine Ratio 08/25/2022 21.3  7.0 - 25.0 Final   • Anion Gap 08/25/2022 9.4  5.0 - 15.0 mmol/L Final   • eGFR 08/25/2022 97.7  >60.0 mL/min/1.73 Final    National Kidney Foundation and American Society of Nephrology (ASN) Task Force recommended calculation based on the Chronic Kidney Disease Epidemiology Collaboration (CKD-EPI) equation refit without adjustment for race.   • Hemoglobin A1C 08/25/2022 5.50  4.80 - 5.60 % Final   • QT Interval 08/25/2022 364  ms Final   • WBC 08/25/2022 8.25  3.40 - 10.80 10*3/mm3 Final   • RBC 08/25/2022 4.75  4.14 - 5.80 10*6/mm3 Final   • Hemoglobin 08/25/2022 16.0  13.0 - 17.7 g/dL Final   • Hematocrit 08/25/2022 47.3  37.5 - 51.0 % Final   • MCV 08/25/2022 99.6 (A) 79.0 - 97.0 fL Final   • MCH 08/25/2022 33.7 (A) 26.6 - 33.0 pg Final   • MCHC 08/25/2022 33.8  31.5 - 35.7 g/dL Final   • RDW 08/25/2022 12.4  12.3 - 15.4 % Final   • RDW-SD 08/25/2022 45.7  37.0 - 54.0 fl Final   • MPV 08/25/2022 9.6  6.0 - 12.0 fL Final   • Platelets 08/25/2022 231  140 - 450 10*3/mm3 Final   • Neutrophil % 08/25/2022 62.6  42.7 - 76.0 % Final   • Lymphocyte % 08/25/2022 25.1  19.6 - 45.3 % Final   • Monocyte % 08/25/2022 7.9  5.0 - 12.0 % Final   • Eosinophil % 08/25/2022 3.5  0.3 - 6.2 % Final   • Basophil % 08/25/2022 0.4  0.0 - 1.5 % Final   • Immature Grans % 08/25/2022 0.5  0.0 - 0.5 % Final   • Neutrophils, Absolute 08/25/2022 5.17  1.70 - 7.00 10*3/mm3 Final   • Lymphocytes, Absolute 08/25/2022 2.07  0.70 - 3.10 10*3/mm3 Final   • Monocytes, Absolute 08/25/2022 0.65  0.10 - 0.90 10*3/mm3  Final   • Eosinophils, Absolute 08/25/2022 0.29  0.00 - 0.40 10*3/mm3 Final   • Basophils, Absolute 08/25/2022 0.03  0.00 - 0.20 10*3/mm3 Final   • Immature Grans, Absolute 08/25/2022 0.04  0.00 - 0.05 10*3/mm3 Final   • nRBC 08/25/2022 0.0  0.0 - 0.2 /100 WBC Final     No results found.    MR arthrogram right shoulder  IMPRESSION:  1. Evidence of previous rotator cuff repair and biceps tendon descends since the prior exam.  2. Recurrent tear in the supraspinatus tendon as described above. There is approximately 2.3 cm of tendon retraction, and there are mild atrophic changes in the muscle.  3. Status post biceps q.d. basis since the prior exam. Blunting and attenuation the superior labrum may be postsurgical.  4. Lateral downsloping of the acromion and degenerative changes in the acromioclavicular joint. Spurring along the inferior margin of the acromioclavicular joint appears reduced from the prior exam and this may be secondary to interval surgery.           Signer Name: Teresa Florez MD   Signed: 2/2/2022 4:20 PM   Workstation Name: [id number]    [facility]      Assessment:  Patient Active Problem List   Diagnosis   • Complete tear of right rotator cuff   • Biceps tendinitis of right upper extremity   • Subacromial impingement of right shoulder   • Status post arthroscopy of shoulder   • Chondromalacia of knee, left       Plan:   1. Patient wishes to proceed with right shoulder arthroscopy, rotator cuff debridement versus repair, subacromial decompression, right distal clavicle excision and all associated procedures.  I reviewed risks benefits and alternatives the procedure with risks including not limited to neurovascular damage, bleeding, infection, chronic pain, re-tear rotator cuff, failure of healing rotator cuff, loss of motion, weakness, stiffness, instability, biceps sag, DVT, pulmonary embolus, death, stroke, complex regional pain syndrome, myocardial infarction, need for  additional procedures. He understood all these had all questions answered.  Patient consented to proceed with surgery.  No guarantees were given regarding results of surgery.   2. The patient denies a history of heart issues or diabetes           Mason Angel was given the opportunity to ask and have all questions answered today.  The patient voiced understanding of the risks, benefits, and alternative forms of treatment that were discussed and the patient consents to proceed with surgery.     Discharge Plan: to home in  Care of family/friend    Shalom Montaño MD      Dragon transcription disclaimer     Much of this encounter note is an electronic transcription/translation of spoken language to printed text. The electronic translation of spoken language may permit erroneous, or at times, nonsensical words or phrases to be inadvertently transcribed. Although I have reviewed the note for such errors, some may still exist.

## 2022-09-13 NOTE — OP NOTE
Date of Operation:  9/9/2022     PREOPERATIVE DIAGNOSIS:  1. Right shoulder recurrent rotator cuff tear  2. Right shoulder subacromial bursitis  3. Right shoulder AC joint arthropathy    POSTOPERATIVE DIAGNOSIS:    1. Right shoulder recurrent rotator cuff tear  2. Right shoulder subacromial bursitis  3. Right shoulder glenohumeral labral fraying  4. Right shoulder AC joint arthropathy    PROCEDURE PERFORMED:   1. Right shoulder arthroscopic rotator cuff repair with bio inductive implant  2. Right shoulder arthroscopy with limited debridement of glenohumeral and subacromial space  3. Right shoulder arthroscopic distal clavicle excision     SURGEON: Shalom Montaño MD     ASSISTANT: Assistant: Dennis Sagastume CSA was responsible for performing the following activities: Retraction, Irrigation, Closing and Held/Positioned Camera and their skilled assistance was necessary for the success of this case.       ANESTHESIA: General endotracheal anesthesia with regional block.       ESTIMATED BLOOD LOSS:  minimal     URINE OUTPUT: Not recorded.       FLUIDS: Per anesthesia.       COMPLICATIONS: None.       SPECIMENS: None.       DRAINS: None.     IMPLANTS:  [employer] 5.5 mm Healicoil anchor x1, medium Regenten bio inductive implant     ARTHROSCOPIC FINDINGS:   1.  Glenoid-grade 1 chondral wear of the glenoid   2.  Humeral head-grade 1/2 chondral wear superior humeral head  3.  Labrum-moderate degenerative fraying of anterior and superior labrum  4.  Biceps tendon-absent from joint space  5.  Rotator cuff-recurrent anterior based rotator cuff tear involving supraspinatus  6.  Axillary pouch-free loose bodies  7.  Subacromial space-moderately thickened subacromial bursa, significant degenerative change to AC joint     INDICATIONS FOR PROCEDURE: Patient is a pleasant 71 y.o. who has had significant limitation and use of right shoulder as well as associated pain with failure of conservative treatments. I discussed treatment  options available to the patient and patient wished to proceed with surgical treatment. I explained details of the procedure, as well as the risks, benefits, and alternatives as documented on history and physical, and the patient had all questions answered prior to signing the operative consent form. No guarantees were given in regard to results of the surgery.       DESCRIPTION OF PROCEDURE: The patient was seen, evaluated, and cleared for surgery by anesthesia. Admitted in the preoperative holding area. The operative site was marked, consent was reviewed, history and physical was updated, and preoperative labs were reviewed. A regional block was then placed per anesthesia. The patient was then taken to the operating room and placed in a supine position on a beachchair table. After successful intubation per anesthesia, facemask was placed securing head at this point time with the neck in normal anatomic position.  Patient was then elevated up into a seated position with neck maintained in normal anatomic alignment. All bony prominences were well-padded and patient was secured to the table with a waist strap. The right  upper extremity was then sterilely prepped and draped in a standard fashion.       A formal timeout was completed, including confirmation of History and Physical, operative consent, surgical site, patient identification number, and preoperative antibiotic administration.       Attention was then turned to creation of posterior portal with a 11 blade followed by insertion of blunt trocar and cannula.  Scope was inserted at this point time.  Anterior portal was then made in the rotator interval with spinal needle using outside in technique.  Cannula was then inserted over a trocar and diagnostic arthroscopic exam was carried out at this point in time with findings as noted above.     Attention was then turned to debridement of glenohumeral joint space including debridement of the superior and anterior,  labral fraying with combination of hand-held shaver and ablation wand.    Minimal debridement was also carried out this point in time of glenoid and humeral head chondral wear primarily with ablation wand on low setting at this time.     Attention was then turned to debridement from the articular side of the region of recurrent rotator cuff tear with suture from prior repair noted and being retrieved with a grasper at this point in time.     Attention was then turned to the subacromial space where the scope was inserted through the posterior portal, cannula inserted through the anterior portal and subacromial space was evaluated at this point in time.  Debridement of subacromial bursitis was completed with shaver as well as ablation wand at this point time    The site of the recurrent rotator cuff tear was once again identified at this point in time and 1 remaining suture was extracted with a grasper at this point.  Rotator cuff tissue was then gently debrided and remaining tissue was assessed for reducibility with a cuff grasper.  Primary reduction was noted to be of the anterior flap both lateral and posterior.  Thus we elected to proceed with initial repair with ultra tape suture x2 in side to side fashion, passing with an Acu pass device and tied with a sliding Doran knot followed by 4 alternating half inches ensuring good loop and knot security.  Extraneous tape was cut short at this time.    In order to provide the appropriate reduction to the bone, we elected to proceed with placement of a 5.5 mm anchor at this time.  Greater tuberosity insertion site was gently debrided with shaver to create a bleeding bony bed and enhance biologic healing potential.  5.5 mm anchor was then tapped and placed at the articular margin and sutures were passed and simple fashion at this time with a first-pass device.  Sutures were then tied down in pairs with sliding Doran knot followed by 4 Regenten half inches ensuring good  loop and knot security.  Sutures were cut short at this time.  Repair was assessed under arthroscopic visualization with full motion noted to be stable.    Given poor quality rotator cuff tissue, would like to proceed with placement of bio inductive implant at this time. Regenten medium sized bio inductive implant was then inserted with insertion device from the lateral portal and laid over top of the residual defect site of the rotator cuff tear.  Soft tissue staples were placed anterior, central, and posterior along the medial margin as well as along the anterior central margin to secure the graft.  Insertion device was removed at this point time and bone staples were placed x 2 laterally.  2 additional soft tissue staples were used to secure the anterior and posterior margins of the graft to the underlying cuff tissue.  Graft was noted to be stable on arthroscopic exam with motion and acceptable position of the graft.    Attention was then turned to arthroscopic distal clavicle excision given significant osteophytes which were projecting inferiorly into the region of the rotator cuff tear.  Ablation wand was used to debride inferior and anterior soft tissues to allow for complete visualization of the distal clavicle where advanced degenerative changes were noted including loss of articular cartilage as well as osteophytes.  Bur was then inserted through an anterior approach with visualization from a posterior approach using both a 30° and 70° scope.  Resection of approximately 5 mm of the distal clavicle was then carried out this point time in line with the AC joint.  Minimal resection of the distal acromion and inferior acromion was completed as well.  Once resection was completed inferior directed pressure was placed in the distal clavicle and there is no evidence of residual impingement at the acromioclavicular joint.  Posterior and superior acromioclavicular  ligaments and capsule were noted to be preserved  following resection    Fluid was evacuated with suction and arthroscopic instruments were removed at this point time.     Attention was then turned to closure the wounds with 3-0 nylon in interrupted fashion.  Wounds were dressed with Xeroform gauze, 4 x 4, Tegaderm, ABD pad, Medipore tape and patient was placed in a sling with abduction pillow to the left side.     At the end of the procedure, all lap, needle, and sponge counts were correct x2. The patient had brisk capillary refill to all digits of the left upper extremity. Compartments were soft and easily compressible at the end of the procedure.       DISPOSITION: The patient was extubated per anesthesia and taken to the recovery room in stable condition. Will follow up in office in 1 week for wound check. Results discussed immediately after procedure with family and all questions were answered at that time.       REHAB:  Will place patient on standard rotator cuff repair protocol, sling x4 weeks, begin physical therapy at week 3.  Avoid crossarm activities given distal clavicle excision.

## 2022-09-16 ENCOUNTER — OFFICE VISIT (OUTPATIENT)
Dept: ORTHOPEDIC SURGERY | Facility: CLINIC | Age: 72
End: 2022-09-16

## 2022-09-16 VITALS — HEIGHT: 67 IN | WEIGHT: 152 LBS | BODY MASS INDEX: 23.86 KG/M2

## 2022-09-16 DIAGNOSIS — Z98.890 STATUS POST ARTHROSCOPY OF SHOULDER: Primary | ICD-10-CM

## 2022-09-16 PROCEDURE — 99024 POSTOP FOLLOW-UP VISIT: CPT | Performed by: NURSE PRACTITIONER

## 2022-09-16 NOTE — PROGRESS NOTES
CC: F/u s/p right shoulder arthroscopy rotator cuff repair with bio inductive implant, limited debridement of glenohumeral subacromial space, distal clavicle excision, DOS 09/09/2022    Interval History: Patient returns to clinic stating pain is doing fairly well, has been using sling as instructed, denies any numbness or tingling over right arm. No fevers, chills, or sweats, and no drainage from incisions noted.    Exam:   Right shoulder- incisions clean, dry, sutures in place   Positive sensation all distributions right hand and proximal lateral aspect arm   Cap refill < 3 seconds, radial pulse 2+   Positive deltoid firing   Flex/extend fingers/thumb/wrist with 4+/5 strength, positive thumbs up, okay sign, cross finger adduction and abduction against resistance     Impression: s/p right shoulder  arthroscopy rotator cuff repair with bio inductive implant, limited debridement of glenohumeral subacromial space, distal clavicle excision     Plan:  1. D/c sutures today and replace with steri-strips- may shower, no submerging wounds x 4 weeks  2. F/u in 3 wks with Dr. Montaño.   3. Will start PT at 3 wk isadora. Continue use of sling x 4 weeks. Work on finger and wrist ROM. May do gentle elbow ROM 2x/day while out of sling for showering or changing clothes.   4. All questions answered      No orders of the defined types were placed in this encounter.      Orders Placed This Encounter   Procedures   • Ambulatory Referral to Physical Therapy     Referral Priority:   Routine     Referral Type:   Physical Therapy     Referral Reason:   Specialty Services Required     Requested Specialty:   Physical Therapy     Number of Visits Requested:   1

## 2022-10-03 ENCOUNTER — TREATMENT (OUTPATIENT)
Dept: PHYSICAL THERAPY | Facility: CLINIC | Age: 72
End: 2022-10-03

## 2022-10-03 DIAGNOSIS — M25.511 ACUTE PAIN OF RIGHT SHOULDER: ICD-10-CM

## 2022-10-03 DIAGNOSIS — M25.619 LIMITED RANGE OF MOTION (ROM) OF SHOULDER: ICD-10-CM

## 2022-10-03 DIAGNOSIS — M62.89 MUSCLE TIGHTNESS: ICD-10-CM

## 2022-10-03 DIAGNOSIS — Z74.1 SELF-CARE DEFICIT FOR DRESSING AND GROOMING: ICD-10-CM

## 2022-10-03 DIAGNOSIS — Z98.890 S/P RIGHT ROTATOR CUFF REPAIR: Primary | ICD-10-CM

## 2022-10-03 DIAGNOSIS — R29.3 POOR POSTURE: ICD-10-CM

## 2022-10-03 PROCEDURE — G0283 ELEC STIM OTHER THAN WOUND: HCPCS | Performed by: PHYSICAL THERAPIST

## 2022-10-03 PROCEDURE — 97110 THERAPEUTIC EXERCISES: CPT | Performed by: PHYSICAL THERAPIST

## 2022-10-03 PROCEDURE — 97140 MANUAL THERAPY 1/> REGIONS: CPT | Performed by: PHYSICAL THERAPIST

## 2022-10-03 PROCEDURE — 97161 PT EVAL LOW COMPLEX 20 MIN: CPT | Performed by: PHYSICAL THERAPIST
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
## 2022-10-05 ENCOUNTER — TREATMENT (OUTPATIENT)
Dept: PHYSICAL THERAPY | Facility: CLINIC | Age: 72
End: 2022-10-05

## 2022-10-05 DIAGNOSIS — Z74.1 SELF-CARE DEFICIT FOR DRESSING AND GROOMING: ICD-10-CM

## 2022-10-05 DIAGNOSIS — M62.89 MUSCLE TIGHTNESS: ICD-10-CM

## 2022-10-05 DIAGNOSIS — M25.511 ACUTE PAIN OF RIGHT SHOULDER: ICD-10-CM

## 2022-10-05 DIAGNOSIS — R29.3 POOR POSTURE: ICD-10-CM

## 2022-10-05 DIAGNOSIS — M25.619 LIMITED RANGE OF MOTION (ROM) OF SHOULDER: ICD-10-CM

## 2022-10-05 DIAGNOSIS — Z98.890 S/P RIGHT ROTATOR CUFF REPAIR: Primary | ICD-10-CM

## 2022-10-05 PROCEDURE — G0283 ELEC STIM OTHER THAN WOUND: HCPCS | Performed by: PHYSICAL THERAPIST

## 2022-10-05 PROCEDURE — 97110 THERAPEUTIC EXERCISES: CPT | Performed by: PHYSICAL THERAPIST

## 2022-10-05 NOTE — PROGRESS NOTES
Physical Therapy Daily Treatment Note      Patient: Mason Angel   : 1950  Referring practitioner: LUANA Azevedo  Date of Initial Visit: Type: THERAPY  Noted: 10/3/2022  Today's Date: 10/5/2022  Patient seen for 2 sessions         Mason Angel reports: R shoulder pain 6/10 today.              Objective   See Exercise, Manual, and Modality Logs for complete treatment.       Assessment/Plan  Pt able to improve PROM of R shoulder today; flexion up to 125 degrees and ER ~15 degrees past neutral. Added supine ER stretch with pillow under elbow for proper alignment. Pt at 3 weeks post op and has follow up with surgeon next Monday. Plan to progress to AAROM with wall slide, cane exercises and pulleys next session at 4 week isadora pending pt's follow up with surgeon.        Progress per Plan of Care and Progress strengthening /stabilization /functional activity           Timed:  Manual Therapy:    10     mins  33586;  Therapeutic Exercise:    20     mins  26629;     Neuromuscular Dewey:    -    mins  89969;    Therapeutic Activity:     -     mins  38301;     Gait Training:      -     mins  54335;     Ultrasound:     -     mins  39888;      Untimed:  Electrical Stimulation:    15     mins  04859 ( );  Mechanical Traction:    -     mins  25701;   Dry needling:      -     mins  /    Timed Treatment:   30   mins (decreased units billed to account for divided time)  Total Treatment:     45   mins  Rosalinda Cline PT  Physical Therapist    License #: [id number]

## 2022-10-10 ENCOUNTER — OFFICE VISIT (OUTPATIENT)
Dept: ORTHOPEDIC SURGERY | Facility: CLINIC | Age: 72
End: 2022-10-10

## 2022-10-10 VITALS — WEIGHT: 152 LBS | HEIGHT: 67 IN | BODY MASS INDEX: 23.86 KG/M2

## 2022-10-10 DIAGNOSIS — Z98.890 STATUS POST ARTHROSCOPY OF SHOULDER: Primary | ICD-10-CM

## 2022-10-10 DIAGNOSIS — M75.121 COMPLETE TEAR OF RIGHT ROTATOR CUFF, UNSPECIFIED WHETHER TRAUMATIC: ICD-10-CM

## 2022-10-10 PROCEDURE — 99024 POSTOP FOLLOW-UP VISIT: CPT | Performed by: ORTHOPAEDIC SURGERY

## 2022-10-10 RX ORDER — OXYCODONE HYDROCHLORIDE AND ACETAMINOPHEN 5; 325 MG/1; MG/1
1 TABLET ORAL EVERY 4 HOURS PRN
Qty: 42 TABLET | Refills: 0 | Status: SHIPPED | OUTPATIENT
Start: 2022-10-10 | End: 2022-11-07

## 2022-10-10 RX ORDER — METHYLPREDNISOLONE 4 MG/1
TABLET ORAL
Qty: 1 EACH | Refills: 0 | Status: SHIPPED | OUTPATIENT
Start: 2022-10-10 | End: 2022-11-07

## 2022-10-10 NOTE — PROGRESS NOTES
CC: F/u s/p right shoulder rotator cuff repair and distal clavicle excision  DOS 9/9/2022    Interval History: Patient returns to clinic stating pain is doing fairly well, has been using sling as instructed, denies any numbness or tingling over right arm. No fevers, chills, or sweats, and no drainage from incisions noted. He has started into physical therapy.    Exam:   Right shoulder- incisions healing well   Tolerates FF- 95,   ER- 20   Positive sensation all distributions right hand and proximal lateral aspect arm, positive deltoid firing   Cap refill < 3 seconds, radial pulse 2+   Positive deltoid firing   Flex/extend fingers/thumb/wrist with 4+/5 strength, positive thumbs up, okay sign, cross finger adduction and abduction against resistance     REHAB:  Will place patient on standard rotator cuff repair protocol, sling x4 weeks, begin physical therapy at week 3.  Avoid crossarm activities given distal clavicle excision.    Impression: s/p right shoulder rotator cuff repair and distal clavicle excision     Plan:  1. Continue PT for work on ROM and progressing into strengthening per protocol  2. Discontinue sling  3. Instructed on passive ROM exercises to be done multiple times daily at home  4. F/u in 4 weeks to evaluate motion and progress with PT  5. All questions answered      No orders of the defined types were placed in this encounter.      No orders of the defined types were placed in this encounter.

## 2022-10-11 ENCOUNTER — TREATMENT (OUTPATIENT)
Dept: PHYSICAL THERAPY | Facility: CLINIC | Age: 72
End: 2022-10-11

## 2022-10-11 DIAGNOSIS — M25.511 ACUTE PAIN OF RIGHT SHOULDER: ICD-10-CM

## 2022-10-11 DIAGNOSIS — M25.619 LIMITED RANGE OF MOTION (ROM) OF SHOULDER: ICD-10-CM

## 2022-10-11 DIAGNOSIS — R29.3 POOR POSTURE: ICD-10-CM

## 2022-10-11 DIAGNOSIS — Z98.890 S/P RIGHT ROTATOR CUFF REPAIR: Primary | ICD-10-CM

## 2022-10-11 DIAGNOSIS — M62.89 MUSCLE TIGHTNESS: ICD-10-CM

## 2022-10-11 PROCEDURE — 97110 THERAPEUTIC EXERCISES: CPT | Performed by: PHYSICAL THERAPIST

## 2022-10-11 PROCEDURE — 97140 MANUAL THERAPY 1/> REGIONS: CPT | Performed by: PHYSICAL THERAPIST

## 2022-10-11 PROCEDURE — G0283 ELEC STIM OTHER THAN WOUND: HCPCS | Performed by: PHYSICAL THERAPIST

## 2022-10-11 NOTE — PROGRESS NOTES
Physical Therapy Daily Treatment Note      Patient: Mason Angel   : 1950  Referring practitioner: LUANA Azevedo  Date of Initial Visit: Type: THERAPY  Noted: 10/3/2022  Today's Date: 10/11/2022  Patient seen for 3 sessions         Mason Angel reports: MD released him from brace and able to drive; increased pain 7-8/10 since out of brace.       Objective   See Exercise, Manual, and Modality Logs for complete treatment.       Assessment/Plan  Initiation of AAROM this date per protocol and good follow up appt with MD. Pt released from sling however noted increased pain and soreness without support of sling today. Updated HEP to add new AAROM exercises with cane and wall slides. Assess pt's response to initiation of AAROM next visit. Pt educated to ice as need with increased activity out of sling.       Progress per Plan of Care and Progress strengthening /stabilization /functional activity         Timed:  Manual Therapy:    12     mins  01370;  Therapeutic Exercise:    28     mins  66453;   (decreased units billed to account for divided time)  Neuromuscular Dewey:    -    mins  17607;    Therapeutic Activity:     -     mins  51855;     Gait Training:      -     mins  12125;     Ultrasound:     -     mins  92303;      Untimed:  Electrical Stimulation:    15     mins  51047 ( );  Mechanical Traction:    -     mins  74489;   Dry needling:      -     mins  18212/    Timed Treatment:   40   mins   Total Treatment:     55   mins    Rosalinda Clien PT  Physical Therapist    License #: [id number]

## 2022-10-13 ENCOUNTER — TREATMENT (OUTPATIENT)
Dept: PHYSICAL THERAPY | Facility: CLINIC | Age: 72
End: 2022-10-13

## 2022-10-13 DIAGNOSIS — R29.3 POOR POSTURE: ICD-10-CM

## 2022-10-13 DIAGNOSIS — Z98.890 STATUS POST ARTHROSCOPY OF SHOULDER: ICD-10-CM

## 2022-10-13 DIAGNOSIS — Z98.890 S/P RIGHT ROTATOR CUFF REPAIR: Primary | ICD-10-CM

## 2022-10-13 DIAGNOSIS — M25.511 ACUTE PAIN OF RIGHT SHOULDER: ICD-10-CM

## 2022-10-13 DIAGNOSIS — M62.89 MUSCLE TIGHTNESS: ICD-10-CM

## 2022-10-13 DIAGNOSIS — M25.619 LIMITED RANGE OF MOTION (ROM) OF SHOULDER: ICD-10-CM

## 2022-10-13 PROCEDURE — 97110 THERAPEUTIC EXERCISES: CPT | Performed by: PHYSICAL THERAPIST

## 2022-10-13 PROCEDURE — G0283 ELEC STIM OTHER THAN WOUND: HCPCS | Performed by: PHYSICAL THERAPIST

## 2022-10-13 PROCEDURE — 97140 MANUAL THERAPY 1/> REGIONS: CPT | Performed by: PHYSICAL THERAPIST

## 2022-10-13 NOTE — PROGRESS NOTES
Physical Therapy Daily Treatment Note    Patient: Mason Angel   : 1950  Referring practitioner: LUANA Azevedo  Date of Initial Visit: Type: THERAPY  Noted: 10/3/2022  Today's Date: 10/13/2022  Patient seen for 4 sessions       Visit Diagnoses:    ICD-10-CM ICD-9-CM   1. S/P right rotator cuff repair  Z98.890 V45.89   2. Acute pain of right shoulder  M25.511 719.41   3. Limited range of motion (ROM) of shoulder  M25.619 719.51   4. Muscle tightness  M62.89 728.9   5. Poor posture  R29.3 781.92       Mason Angel reports: it is better than it was the other day.      Subjective       Objective   See Exercise, Manual, and Modality Logs for complete treatment.       Assessment & Plan     Assessment    Assessment details: Pt is tolerating treatment well with decreasing pain and soreness since his last visit.  Did not change or add any additional exercises today due to having new exercises last visit, except added wall slides vs flex.  Pt was able to complete all AAROM exercises with minimal cueing and discomfort today.  Will continue to see pt 3x/week for stretching, strengthening and modalities PRN for pain per protocol.             Progress per Plan of Care      Timed:         Manual Therapy:  12       mins  26250;     Therapeutic Exercise:   32      mins  08231;     Neuromuscular Dewey:        mins  51855;    Therapeutic Activity:          mins  22551;     Gait Training:           mins  85791;     Ultrasound:          mins  02261;    Ionto                                   mins   86921  Self Care                            mins   99579  Canalith Repos         mins 54998      Un-Timed:  Electrical Stimulation:   15      mins  22733 ( );  Dry Needling          mins self-pay  Traction          mins 46489      Timed Treatment:  44    mins   Total Treatment:   65     mins    Renetta Baldwin, PT  [place] License: [id number]

## 2022-10-18 ENCOUNTER — TREATMENT (OUTPATIENT)
Dept: PHYSICAL THERAPY | Facility: CLINIC | Age: 72
End: 2022-10-18

## 2022-10-18 DIAGNOSIS — M25.619 LIMITED RANGE OF MOTION (ROM) OF SHOULDER: ICD-10-CM

## 2022-10-18 DIAGNOSIS — M62.89 MUSCLE TIGHTNESS: ICD-10-CM

## 2022-10-18 DIAGNOSIS — M25.511 ACUTE PAIN OF RIGHT SHOULDER: ICD-10-CM

## 2022-10-18 DIAGNOSIS — Z98.890 S/P RIGHT ROTATOR CUFF REPAIR: Primary | ICD-10-CM

## 2022-10-18 PROCEDURE — 97110 THERAPEUTIC EXERCISES: CPT | Performed by: PHYSICAL THERAPIST

## 2022-10-18 PROCEDURE — 97140 MANUAL THERAPY 1/> REGIONS: CPT | Performed by: PHYSICAL THERAPIST

## 2022-10-18 PROCEDURE — G0283 ELEC STIM OTHER THAN WOUND: HCPCS | Performed by: PHYSICAL THERAPIST

## 2022-10-18 NOTE — PROGRESS NOTES
Physical Therapy Daily Treatment Note      Patient: Mason Angel   : 1950  Referring practitioner: LUANA Azevedo  Date of Initial Visit: Type: THERAPY  Noted: 10/3/2022  Today's Date: 10/18/2022  Patient seen for 5 sessions         Mason Angel reports: increased pain the past few days 7/10 at start of session.       Objective   See Exercise, Manual, and Modality Logs for complete treatment.    R shoulder PROM  Flexion 125 degrees   degrees  ER 72 degrees      Assessment/Plan  Pt is 6 weeks post op R RTC repair; next follow up with MD 22. Continued PROM and AAROM this date; completed to pt's tolerance. Continue to progress per protocol.        Progress per Plan of Care and Progress strengthening /stabilization /functional activity           Timed:  Manual Therapy:    15     mins  55840;  Therapeutic Exercise:    12     mins  24697;     Neuromuscular Dewey:    -    mins  40687;    Therapeutic Activity:     -     mins  80431;     Gait Training:      -     mins  62774;     Ultrasound:     -     mins  47028;      Untimed:  Electrical Stimulation:    15     mins  10796 ( );  Mechanical Traction:    -     mins  52363;   Dry needling:      -     mins  01398/    Timed Treatment:   27   mins   Total Treatment:     45   mins    Rosalinda Cline PT  Physical Therapist    License #: [id number]

## 2022-10-20 ENCOUNTER — TREATMENT (OUTPATIENT)
Dept: PHYSICAL THERAPY | Facility: CLINIC | Age: 72
End: 2022-10-20

## 2022-10-20 DIAGNOSIS — Z98.890 S/P RIGHT ROTATOR CUFF REPAIR: Primary | ICD-10-CM

## 2022-10-20 DIAGNOSIS — M62.89 MUSCLE TIGHTNESS: ICD-10-CM

## 2022-10-20 DIAGNOSIS — M25.511 ACUTE PAIN OF RIGHT SHOULDER: ICD-10-CM

## 2022-10-20 DIAGNOSIS — R29.3 POOR POSTURE: ICD-10-CM

## 2022-10-20 DIAGNOSIS — M25.619 LIMITED RANGE OF MOTION (ROM) OF SHOULDER: ICD-10-CM

## 2022-10-20 PROCEDURE — 97110 THERAPEUTIC EXERCISES: CPT | Performed by: PHYSICAL THERAPIST

## 2022-10-20 PROCEDURE — G0283 ELEC STIM OTHER THAN WOUND: HCPCS | Performed by: PHYSICAL THERAPIST

## 2022-10-20 PROCEDURE — 97140 MANUAL THERAPY 1/> REGIONS: CPT | Performed by: PHYSICAL THERAPIST

## 2022-10-20 NOTE — PROGRESS NOTES
Physical Therapy Daily Treatment Note      Patient: Mason Angel   : 1950  Referring practitioner: LUANA Azevedo  Date of Initial Visit: Type: THERAPY  Noted: 10/3/2022  Today's Date: 10/20/2022  Patient seen for 6 sessions         Mason Angel reports: improving pain since last session; 5/10 pain in R shoulder today.       Objective   See Exercise, Manual, and Modality Logs for complete treatment.       Assessment/Plan  Added bicep, tricep, IR and shoulder ext strengthening per protocol, cues for UT relaxation with strengthening exercises; pt is 6 weeks s/p shoulder surgery tomorrow. Plan to assess response to addition of strengthening exercises prior to updating HEP. Improving PROM to pt's tolerance with soft end feel; limited by pain; joint mobs hypomobile posterior and inferior.        Progress per Plan of Care and Progress strengthening /stabilization /functional activity           Timed:  Manual Therapy:    15     mins  89025;  Therapeutic Exercise:    18     mins  06479;     Neuromuscular Dewey:    -    mins  19253;    Therapeutic Activity:     -     mins  32717;     Gait Training:      -     mins  61225;     Ultrasound:     -     mins  67139;      Untimed:  Electrical Stimulation:    15     mins  88153 ( );  Mechanical Traction:    -     mins  92668;   Dry needling:      -     mins  04154/    Timed Treatment:   33   mins   Total Treatment:     50   mins  Rosalinda Cline PT  Physical Therapist    License #: [id number]

## 2022-10-26 ENCOUNTER — TREATMENT (OUTPATIENT)
Dept: PHYSICAL THERAPY | Facility: CLINIC | Age: 72
End: 2022-10-26

## 2022-10-26 DIAGNOSIS — M62.89 MUSCLE TIGHTNESS: ICD-10-CM

## 2022-10-26 DIAGNOSIS — Z98.890 S/P RIGHT ROTATOR CUFF REPAIR: Primary | ICD-10-CM

## 2022-10-26 DIAGNOSIS — M25.619 LIMITED RANGE OF MOTION (ROM) OF SHOULDER: ICD-10-CM

## 2022-10-26 DIAGNOSIS — R29.3 POOR POSTURE: ICD-10-CM

## 2022-10-26 DIAGNOSIS — M25.511 ACUTE PAIN OF RIGHT SHOULDER: ICD-10-CM

## 2022-10-26 PROCEDURE — 97110 THERAPEUTIC EXERCISES: CPT | Performed by: PHYSICAL THERAPIST

## 2022-10-26 PROCEDURE — G0283 ELEC STIM OTHER THAN WOUND: HCPCS | Performed by: PHYSICAL THERAPIST

## 2022-10-26 PROCEDURE — 97140 MANUAL THERAPY 1/> REGIONS: CPT | Performed by: PHYSICAL THERAPIST

## 2022-10-26 NOTE — PROGRESS NOTES
Physical Therapy Daily Treatment Note      Patient: Mason Angel   : 1950  Referring practitioner: LUANA Azevedo  Date of Initial Visit: Type: THERAPY  Noted: 10/3/2022  Today's Date: 10/26/2022  Patient seen for 7 sessions         Mason Angel reports: he has good days and bad days; using his arm more with functional ax.        Objective   See Exercise, Manual, and Modality Logs for complete treatment.       Assessment/Plan  Continued AAROM stretches today; continued resisted scapular strengthening along with bicep/tricep strengthening added submaximal isometrics today. Updated and issued HEP; did not add isometrics to HEP yet.       Progress per Plan of Care and Progress strengthening /stabilization /functional activity           Timed:  Manual Therapy:    15     mins  81205;  Therapeutic Exercise:    20     mins  48901;     Neuromuscular Dewey:    -    mins  62608;    Therapeutic Activity:     -     mins  93096;     Gait Training:      -     mins  86627;     Ultrasound:     -     mins  95266;      Untimed:  Electrical Stimulation:    15     mins  88259 ( );  Mechanical Traction:    -     mins  01872;   Dry needling:      -     mins  30054/63551    Timed Treatment:   35   mins   Total Treatment:     55   mins    Rosalinda Cline PT  Physical Therapist  License #: [id number]

## 2022-10-28 ENCOUNTER — TREATMENT (OUTPATIENT)
Dept: PHYSICAL THERAPY | Facility: CLINIC | Age: 72
End: 2022-10-28

## 2022-10-28 DIAGNOSIS — M25.511 ACUTE PAIN OF RIGHT SHOULDER: ICD-10-CM

## 2022-10-28 DIAGNOSIS — M62.89 MUSCLE TIGHTNESS: ICD-10-CM

## 2022-10-28 DIAGNOSIS — R29.3 POOR POSTURE: ICD-10-CM

## 2022-10-28 DIAGNOSIS — Z98.890 S/P RIGHT ROTATOR CUFF REPAIR: Primary | ICD-10-CM

## 2022-10-28 DIAGNOSIS — M25.619 LIMITED RANGE OF MOTION (ROM) OF SHOULDER: ICD-10-CM

## 2022-10-28 PROCEDURE — 97110 THERAPEUTIC EXERCISES: CPT | Performed by: PHYSICAL THERAPIST

## 2022-10-28 PROCEDURE — G0283 ELEC STIM OTHER THAN WOUND: HCPCS | Performed by: PHYSICAL THERAPIST

## 2022-10-28 PROCEDURE — 97140 MANUAL THERAPY 1/> REGIONS: CPT | Performed by: PHYSICAL THERAPIST

## 2022-10-28 NOTE — PROGRESS NOTES
Physical Therapy Daily Treatment Note      Patient: Mason Angel   : 1950  Referring practitioner: LUANA Azevedo  Date of Initial Visit: Type: THERAPY  Noted: 10/3/2022  Today's Date: 10/28/2022  Patient seen for 8 sessions         Mason Angel reports: soreness after last session; pain up to 8/10 today.         Objective   See Exercise, Manual, and Modality Logs for complete treatment.       Assessment/Plan  Continues to progress ROM of R shoulder with PROM and AAROM exercises; soreness after addition of isometrics last session. Reviewed isometrics today and educated on sub maximal muscle contraction to improve tolerance and decrease soreness with strengthening. Continued resistance exercises per protocol with no increase in reps today due to soreness. Cues for shoulder positioning/posture with decreased UT activation throughout exercises.        Progress per Plan of Care and Progress strengthening /stabilization /functional activity per protocol.           Timed:  Manual Therapy:    15     mins  34065;  Therapeutic Exercise:    30     mins  25004;   (decrease units billed to account for divided time)  Neuromuscular Dewey:    -    mins  83552;    Therapeutic Activity:     -     mins  63028;     Gait Training:      -     mins  86425;     Ultrasound:     -     mins  65235;      Untimed:  Electrical Stimulation:    15     mins  10782 ( );  Mechanical Traction:    -     mins  64568;   Dry needling:      -     mins  86674/    Timed Treatment:   45   mins   Total Treatment:     65   mins    Rosalinda Cline PT  Physical Therapist  License #: [id number]

## 2022-11-01 ENCOUNTER — TREATMENT (OUTPATIENT)
Dept: PHYSICAL THERAPY | Facility: CLINIC | Age: 72
End: 2022-11-01

## 2022-11-01 DIAGNOSIS — R29.3 POOR POSTURE: ICD-10-CM

## 2022-11-01 DIAGNOSIS — M25.619 LIMITED RANGE OF MOTION (ROM) OF SHOULDER: ICD-10-CM

## 2022-11-01 DIAGNOSIS — M62.89 MUSCLE TIGHTNESS: ICD-10-CM

## 2022-11-01 DIAGNOSIS — Z98.890 S/P RIGHT ROTATOR CUFF REPAIR: Primary | ICD-10-CM

## 2022-11-01 DIAGNOSIS — M25.511 ACUTE PAIN OF RIGHT SHOULDER: ICD-10-CM

## 2022-11-01 PROCEDURE — 97110 THERAPEUTIC EXERCISES: CPT | Performed by: PHYSICAL THERAPIST

## 2022-11-01 PROCEDURE — 97035 APP MDLTY 1+ULTRASOUND EA 15: CPT | Performed by: PHYSICAL THERAPIST

## 2022-11-01 PROCEDURE — 97140 MANUAL THERAPY 1/> REGIONS: CPT | Performed by: PHYSICAL THERAPIST

## 2022-11-01 PROCEDURE — G0283 ELEC STIM OTHER THAN WOUND: HCPCS | Performed by: PHYSICAL THERAPIST

## 2022-11-01 NOTE — PROGRESS NOTES
Physical Therapy Daily Treatment Note      Patient: Mason Angel   : 1950  Referring practitioner: LUANA Azevedo  Date of Initial Visit: Type: THERAPY  Noted: 10/3/2022  Today's Date: 2022  Patient seen for 9 sessions         Mason Angel reports: R shoulder pain 7/10 less soreness than after the previous session when we introduced isometrics       Objective   See Exercise, Manual, and Modality Logs for complete treatment.       Assessment/Plan  Continued PROM and AAROM to R shoulder; modified AAROM to include physioball rolls in all directions. US added to R UT/scap and anterior shoulder along with STM to medial scapular area. Pt reports severe tenderness with STM but relief after session. E-stim and heat used today post exercises. Assess response to STM and US next session. Pt to follow up with surgeon 22.       Progress per Plan of Care and Progress strengthening /stabilization /functional activity           Timed:  Manual Therapy:    20     mins  40992;  Therapeutic Exercise:    20     mins  25965;     Neuromuscular Dewey:    -    mins  75400;    Therapeutic Activity:     -     mins  54381;     Gait Training:      -     mins  01040;     Ultrasound:     8     mins  65514;      Untimed:  Electrical Stimulation:    15     mins  06587 ( );  Mechanical Traction:    -     mins  40984;   Dry needling:      -     mins  68013/    Timed Treatment:   48   mins   Total Treatment:     70   mins    Rosalinda Cline PT  Physical Therapist  License #: [id number]

## 2022-11-04 ENCOUNTER — TREATMENT (OUTPATIENT)
Dept: PHYSICAL THERAPY | Facility: CLINIC | Age: 72
End: 2022-11-04

## 2022-11-04 DIAGNOSIS — R29.3 POOR POSTURE: ICD-10-CM

## 2022-11-04 DIAGNOSIS — Z98.890 S/P RIGHT ROTATOR CUFF REPAIR: Primary | ICD-10-CM

## 2022-11-04 DIAGNOSIS — M62.89 MUSCLE TIGHTNESS: ICD-10-CM

## 2022-11-04 DIAGNOSIS — M25.619 LIMITED RANGE OF MOTION (ROM) OF SHOULDER: ICD-10-CM

## 2022-11-04 DIAGNOSIS — M25.511 ACUTE PAIN OF RIGHT SHOULDER: ICD-10-CM

## 2022-11-04 PROCEDURE — 97530 THERAPEUTIC ACTIVITIES: CPT | Performed by: PHYSICAL THERAPIST

## 2022-11-04 PROCEDURE — 97035 APP MDLTY 1+ULTRASOUND EA 15: CPT | Performed by: PHYSICAL THERAPIST

## 2022-11-04 PROCEDURE — 97140 MANUAL THERAPY 1/> REGIONS: CPT | Performed by: PHYSICAL THERAPIST

## 2022-11-04 PROCEDURE — 97110 THERAPEUTIC EXERCISES: CPT | Performed by: PHYSICAL THERAPIST

## 2022-11-04 PROCEDURE — G0283 ELEC STIM OTHER THAN WOUND: HCPCS | Performed by: PHYSICAL THERAPIST

## 2022-11-04 NOTE — PROGRESS NOTES
Re-Assessment / MD note    Patient: Mason Angel   : 1950  Diagnosis/ICD-10 Code:  S/P right rotator cuff repair [Z98.890]  Referring practitioner: LUANA Azevedo  Date of Initial Visit: Type: THERAPY  Noted: 10/3/2022  Today's Date: 2022  Patient seen for 10 sessions      Subjective:   Mason Angel reports: 60% better after last visit with addition of US and STM last visit to UT/scapula  Subjective Questionnaire: QuickDASH: 47.73  Clinical Progress: improved  Home Program Compliance: Yes  Treatment has included: therapeutic exercise, neuromuscular re-education, manual therapy, ultrasound, electrical stimulation, moist heat and cryotherapy    Objective          Palpation     Right   Hypertonic in the levator scapulae, rhomboids and upper trapezius. Tenderness of the levator scapulae, rhomboids and upper trapezius.     Tenderness     Right Shoulder  Tenderness in the AC joint, acromion, coracoid process, medial scapula and scapular spine.           Active Range of Motion     Right Shoulder   Flexion: 130 degrees with pain  Abduction: 135 (scaption) degrees with pain  External rotation 0°: 58 degrees with pain  BTH: C2  BTB: lumbar     Passive Range of Motion      Right Shoulder   Flexion: 145 degrees with pain at end range  Abduction: 155 (scaption) degrees with pain  External rotation 0°: 45 degrees with pain  External rotation 45°: 70 degrees with pain  External rotation 90°: 60 degrees with pain  Internal rotation 45° : 55 degrees with pain     Scapular Mobility      Right Shoulder   Scapular mobility: good     Joint Play      Right Shoulder  Hypomobile in the posterior capsule and inferior capsule.      Strength/Myotome Testing     R shoulder  Flexion: 3+  ABD: 3+  ER: 4-  IR: 4    Assessment & Plan     Assessment  Impairments: abnormal muscle firing, abnormal muscle tone, abnormal or restricted ROM, activity intolerance, impaired physical strength, lacks appropriate home exercise  program and pain with function  Functional Limitations: carrying objects, lifting, sleeping, pulling, pushing, uncomfortable because of pain, moving in bed, reaching behind back, reaching overhead and unable to perform repetitive tasks  Assessment details: Mason Angel is a 71 y.o. year-old male referred to physical therapy s/p R RTC repair with bio inductive implant and debridement along with distal clavicle excision (9/9/22). Pt is 8 weeks into his protocol. Pt with improving PROM and AAROM of R shoulder WFL; still progressing full AROM. Initiated isometrics and resistance bands for strengthening ~2 weeks ago; lacking full strength and painful with resistance testing. Cues for posture with decreased compensation at UT with arm elevation. Patient is appropriate for skilled physical therapy in order to reduce pain and increase ease with daily mobility. During therapy, pt educated on anatomy, goal of interventions, sleeping position/brace wear, following surgical protocol for safety and body mechanics to promote healthy lifestyle and improve quality of life.  Prognosis: good    Goals  Plan Goals: STG: ~6 weeks  1. Pt will demonstrate R shoulder PROM in flexion and scaption of >140 degrees in preparation for active overhead activity-MET  2. Pt will demonstrate R shoulder PROM in ER to at least 60 degrees-MET  3. Pt will be compliant brace wear and stretches out of sling at least 3 times per day-MET  4. Decrease R UE tenderness with palpation to minimal over the acromion and supraspinatus tendon to be able to sleep comfortably in a bed-MET    LTG: ~12 weeks  1. Pt will demonstrate R shoulder AROM WNL and minimal pain to be able to complete overhead reaching/lifting-PROGRESSING  2. Pt will be able to reach BTB lumbar spine using RUE-MET  3. Pt will improve R shoulder strength to at least 4/5 in flexion, ABD, ER and IR to improve quality of life-PROGRESSING  4. Pt will improve QuickDASH score to 20 or less to improve  subjective function of shoulder with ADLs-PROGRESSING  5.  Pt will be able to return to fishing using RUE without pain-NOT MET      Plan  Therapy options: will be seen for skilled therapy services  Planned modality interventions: cryotherapy, electrical stimulation/Russian stimulation, iontophoresis, TENS, thermotherapy (hydrocollator packs), ultrasound and dry needling  Planned therapy interventions: ADL retraining, balance/weight-bearing training, body mechanics training, fine motor coordination training, flexibility, functional ROM exercises, home exercise program, IADL retraining, joint mobilization, manual therapy, neuromuscular re-education, postural training, soft tissue mobilization, spinal/joint mobilization, strengthening, stretching and therapeutic activities  Treatment plan discussed with: patient  Plan details: 2 times per week for 8 weeks        Visit Diagnoses:    ICD-10-CM ICD-9-CM   1. S/P right rotator cuff repair  Z98.890 V45.89   2. Acute pain of right shoulder  M25.511 719.41   3. Limited range of motion (ROM) of shoulder  M25.619 719.51   4. Muscle tightness  M62.89 728.9   5. Poor posture  R29.3 781.92         PT Signature: Rosalinda Cline PT  [place] license: [id number]      Timed:  Manual Therapy:    15     mins  49700;  Therapeutic Exercise:    20     mins  47228;     Neuromuscular Dewey:    -    mins  18751;    Therapeutic Activity:     10     mins  71693;     Gait Training:      -     mins  54940;     Ultrasound:     8     mins  10325;    Electrical Stimulation:    -     mins  27193 ( );    Untimed:  Electrical Stimulation:    15     mins  01716 ( );  Mechanical Traction:    -     mins  28448; \  Dry needling:      -     mins  20560/20561    Timed Treatment:   53   mins   Total Treatment:     70   mins

## 2022-11-07 ENCOUNTER — TREATMENT (OUTPATIENT)
Dept: PHYSICAL THERAPY | Facility: CLINIC | Age: 72
End: 2022-11-07

## 2022-11-07 ENCOUNTER — OFFICE VISIT (OUTPATIENT)
Dept: ORTHOPEDIC SURGERY | Facility: CLINIC | Age: 72
End: 2022-11-07

## 2022-11-07 VITALS — WEIGHT: 152 LBS | BODY MASS INDEX: 23.86 KG/M2 | HEIGHT: 67 IN

## 2022-11-07 DIAGNOSIS — Z98.890 S/P RIGHT ROTATOR CUFF REPAIR: Primary | ICD-10-CM

## 2022-11-07 DIAGNOSIS — Z98.890 STATUS POST ARTHROSCOPY OF SHOULDER: Primary | ICD-10-CM

## 2022-11-07 DIAGNOSIS — R29.3 POOR POSTURE: ICD-10-CM

## 2022-11-07 DIAGNOSIS — M25.619 LIMITED RANGE OF MOTION (ROM) OF SHOULDER: ICD-10-CM

## 2022-11-07 DIAGNOSIS — M25.511 ACUTE PAIN OF RIGHT SHOULDER: ICD-10-CM

## 2022-11-07 DIAGNOSIS — M62.89 MUSCLE TIGHTNESS: ICD-10-CM

## 2022-11-07 PROCEDURE — 97035 APP MDLTY 1+ULTRASOUND EA 15: CPT | Performed by: PHYSICAL THERAPIST

## 2022-11-07 PROCEDURE — G0283 ELEC STIM OTHER THAN WOUND: HCPCS | Performed by: PHYSICAL THERAPIST

## 2022-11-07 PROCEDURE — 99024 POSTOP FOLLOW-UP VISIT: CPT | Performed by: ORTHOPAEDIC SURGERY

## 2022-11-07 PROCEDURE — 97140 MANUAL THERAPY 1/> REGIONS: CPT | Performed by: PHYSICAL THERAPIST

## 2022-11-07 PROCEDURE — 97110 THERAPEUTIC EXERCISES: CPT | Performed by: PHYSICAL THERAPIST

## 2022-11-07 RX ORDER — PREDNISONE 10 MG/1
TABLET ORAL
Qty: 39 TABLET | Refills: 0 | Status: SHIPPED | OUTPATIENT
Start: 2022-11-07 | End: 2022-12-07

## 2022-11-07 RX ORDER — HYDROCODONE BITARTRATE AND ACETAMINOPHEN 10; 325 MG/1; MG/1
TABLET ORAL
COMMUNITY
Start: 2022-11-01 | End: 2022-12-07 | Stop reason: SDUPTHER

## 2022-11-07 NOTE — PROGRESS NOTES
Physical Therapy Daily Treatment Note      Patient: Mason Angel   : 1950  Referring practitioner: LUANA Azevedo  Date of Initial Visit: Type: THERAPY  Noted: 10/3/2022  Today's Date: 2022  Patient seen for 11 sessions         Mason Angel reports: still sore at top of the shoulder blade and down into shoulder. Pain 5-6/10 today.      Objective   See Exercise, Manual, and Modality Logs for complete treatment.       Assessment/Plan  Progressed resistance band levels today for strengthening with no increased pain; cues for shoulder positioning and decreased compensation at UT with isometrics and shoulder raises. Pt has follow up appt today with surgeon and MD note printed for him with updated objective measures from last session.        Progress per Plan of Care and Progress strengthening /stabilization /functional activity           Timed:  Manual Therapy:    10     mins  30807;  Therapeutic Exercise:    20     mins  52527;     Neuromuscular Dewey:    -    mins  96218;    Therapeutic Activity:     -     mins  18369;     Gait Training:      -     mins  39812;     Ultrasound:     8     mins  47709;      Untimed:  Electrical Stimulation:    15     mins  72701 ( );  Mechanical Traction:    -     mins  67931;   Dry needling:      -     mins  52657/    Timed Treatment:   38   mins   Total Treatment:     60   mins    Rosalinda Cline PT  Physical Therapist  License #: [id number]

## 2022-11-07 NOTE — PROGRESS NOTES
CC: F/u s/p right shoulder rotator cuff repair and distal clavicle excision  DOS 9/9/2022     Interval History: Patient returns to clinic for follow-up of right shoulder.    The patient states overall he is doing fairly well at this time in regards to right shoulder range of motion and function.    The patient reports experiencing persistent right shoulder tightness with overhead activities, such as stretching. The patient denies experiencing any right shoulder numbness or tingling sensations, fevers, chills, or sweats. The patient reports attempting to treat right shoulder pain with Medrol Dosepak with improvement of symptoms.       Exam:              Right shoulder- incisions healing well              FF Active- 140 degrees   Passive- 155 degrees   Strength- 4/5   ER Active- 40 degrees   Strength- 4+/5              Positive sensation all distributions right hand and proximal lateral aspect arm, positive deltoid firing              Cap refill < 3 seconds, radial pulse 2+              Positive deltoid firing              Flex/extend fingers/thumb/wrist with 4+/5 strength, positive thumbs up, okay sign, cross finger adduction and abduction against resistance                REHAB:  Will place patient on standard rotator cuff repair protocol, sling x4 weeks, begin physical therapy at week 3.  Avoid crossarm activities given distal clavicle excision.     Impression: s/p right shoulder rotator cuff repair and distal clavicle excision                Plan:  1. Discussed treatment options at length with patient at today's visit.  2. At this point in time, we will proceed with prednisone taper to try to help with residual stiffness.  3. Continue with physical therapy 2 times to 3 times a week to work on range of motion and strength as much as tolerated.  4. Follow up in 4 weeks for reassessment. If still having issues at that time, may consider intra-articular injection.        Transcribed from ambient dictation for Shalom  DL Montaño MD by Salma Mojica.  11/07/22   15:37 EST    Patient or patient representative verbalized consent to the visit recording.  I have personally performed the services described in this document as transcribed by the above individual, and it is both accurate and complete.

## 2022-11-11 ENCOUNTER — TREATMENT (OUTPATIENT)
Dept: PHYSICAL THERAPY | Facility: CLINIC | Age: 72
End: 2022-11-11

## 2022-11-11 DIAGNOSIS — R29.3 POOR POSTURE: ICD-10-CM

## 2022-11-11 DIAGNOSIS — M25.619 LIMITED RANGE OF MOTION (ROM) OF SHOULDER: ICD-10-CM

## 2022-11-11 DIAGNOSIS — Z98.890 S/P RIGHT ROTATOR CUFF REPAIR: Primary | ICD-10-CM

## 2022-11-11 DIAGNOSIS — M62.89 MUSCLE TIGHTNESS: ICD-10-CM

## 2022-11-11 DIAGNOSIS — M25.511 ACUTE PAIN OF RIGHT SHOULDER: ICD-10-CM

## 2022-11-11 PROCEDURE — 97110 THERAPEUTIC EXERCISES: CPT | Performed by: PHYSICAL THERAPIST

## 2022-11-11 PROCEDURE — 97035 APP MDLTY 1+ULTRASOUND EA 15: CPT | Performed by: PHYSICAL THERAPIST

## 2022-11-11 PROCEDURE — 97140 MANUAL THERAPY 1/> REGIONS: CPT | Performed by: PHYSICAL THERAPIST

## 2022-11-11 PROCEDURE — G0283 ELEC STIM OTHER THAN WOUND: HCPCS | Performed by: PHYSICAL THERAPIST

## 2022-11-11 NOTE — PROGRESS NOTES
Physical Therapy Daily Treatment Note      Patient: Mason Angel   : 1950  Referring practitioner: LUANA Azevedo  Date of Initial Visit: Type: THERAPY  Noted: 10/3/2022  Today's Date: 2022  Patient seen for 12 sessions         Mason Angel reports: Dr. Montaño started him on a oral steroid pack and pain only 2-3/10 today. Next follow up in 4 weeks.            Objective   See Exercise, Manual, and Modality Logs for complete treatment.       Assessment/Plan  Progressed reps of strengthening today; attempted shoulder flexion and ABD with yellow TB but compensation and increased pain with exercises, remaining completed without band. Initiation of PNF AROM, no resistance today. Continued US to R UT. Improving PROM and joint mobs to R shoulder. Added rhythmic stab manually to R shoulder today to improve R shoulder strength and stability.        Progress per Plan of Care and Progress strengthening /stabilization /functional activity           Timed:  Manual Therapy:    15     mins  13417;  Therapeutic Exercise:    20     mins  50450;     Neuromuscular Dewey:    -    mins  74585;    Therapeutic Activity:     -     mins  83510;     Gait Training:      -     mins  66676;     Ultrasound:     8     mins  67678;      Untimed:  Electrical Stimulation:    15     mins  38238 ( );  Mechanical Traction:    -     mins  33571;   Dry needling:      -     mins  59764/    Timed Treatment:   43   mins   Total Treatment:     60   mins  Rosalinda Cline PT  Physical Therapist    License #: [id number]

## 2022-11-14 ENCOUNTER — TREATMENT (OUTPATIENT)
Dept: PHYSICAL THERAPY | Facility: CLINIC | Age: 72
End: 2022-11-14

## 2022-11-14 DIAGNOSIS — M62.89 MUSCLE TIGHTNESS: ICD-10-CM

## 2022-11-14 DIAGNOSIS — R29.3 POOR POSTURE: ICD-10-CM

## 2022-11-14 DIAGNOSIS — M25.511 ACUTE PAIN OF RIGHT SHOULDER: ICD-10-CM

## 2022-11-14 DIAGNOSIS — M25.619 LIMITED RANGE OF MOTION (ROM) OF SHOULDER: ICD-10-CM

## 2022-11-14 DIAGNOSIS — Z98.890 S/P RIGHT ROTATOR CUFF REPAIR: Primary | ICD-10-CM

## 2022-11-14 PROCEDURE — 97110 THERAPEUTIC EXERCISES: CPT | Performed by: PHYSICAL THERAPIST

## 2022-11-14 PROCEDURE — 97035 APP MDLTY 1+ULTRASOUND EA 15: CPT | Performed by: PHYSICAL THERAPIST

## 2022-11-14 PROCEDURE — G0283 ELEC STIM OTHER THAN WOUND: HCPCS | Performed by: PHYSICAL THERAPIST

## 2022-11-14 PROCEDURE — 97140 MANUAL THERAPY 1/> REGIONS: CPT | Performed by: PHYSICAL THERAPIST

## 2022-11-14 NOTE — PROGRESS NOTES
Physical Therapy Daily Treatment Note      Patient: Mason Angel   : 1950  Referring practitioner: LUANA Azevedo  Date of Initial Visit: Type: THERAPY  Noted: 10/3/2022  Today's Date: 2022  Patient seen for 13 sessions         Mason Angel reports: a little more sore today but not too bad.         Objective   See Exercise, Manual, and Modality Logs for complete treatment.       Assessment/Plan  Added wall push ups, lower scap setting and ball circles against wall with weighted ball to improve joint strength and stability; improving arm elevation ROM actively and decreased compensation at UT. Pain at end range of all passive motion but improved to WFL. Muscle spasm with initial e-stim set up at lateral scapular boarder, intensity decreased and scapular electrode moved to less tender area with improved tolerance.        Progress per Plan of Care and Progress strengthening /stabilization /functional activity         Timed:  Manual Therapy:    15     mins  95014;  Therapeutic Exercise:    28     mins  43712;     Neuromuscular Dewey:    -    mins  68403;    Therapeutic Activity:     -     mins  04474;     Gait Training:      -     mins  76133;     Ultrasound:     8     mins  39971;      Untimed:  Electrical Stimulation:    15     mins  11445 ( );  Mechanical Traction:    -     mins  68113;   Dry needling:      -     mins  /    Timed Treatment:   51   mins   Total Treatment:     70   mins    Rosalinda Cline PT  Physical Therapist  License #: [id number]

## 2022-11-18 ENCOUNTER — TREATMENT (OUTPATIENT)
Dept: PHYSICAL THERAPY | Facility: CLINIC | Age: 72
End: 2022-11-18

## 2022-11-18 DIAGNOSIS — Z98.890 S/P RIGHT ROTATOR CUFF REPAIR: Primary | ICD-10-CM

## 2022-11-18 DIAGNOSIS — R29.3 POOR POSTURE: ICD-10-CM

## 2022-11-18 DIAGNOSIS — M62.89 MUSCLE TIGHTNESS: ICD-10-CM

## 2022-11-18 DIAGNOSIS — M25.619 LIMITED RANGE OF MOTION (ROM) OF SHOULDER: ICD-10-CM

## 2022-11-18 DIAGNOSIS — M25.511 ACUTE PAIN OF RIGHT SHOULDER: ICD-10-CM

## 2022-11-18 PROCEDURE — 97140 MANUAL THERAPY 1/> REGIONS: CPT | Performed by: PHYSICAL THERAPIST

## 2022-11-18 PROCEDURE — 97110 THERAPEUTIC EXERCISES: CPT | Performed by: PHYSICAL THERAPIST

## 2022-11-18 PROCEDURE — 97530 THERAPEUTIC ACTIVITIES: CPT | Performed by: PHYSICAL THERAPIST

## 2022-11-18 NOTE — PROGRESS NOTES
Physical Therapy Daily Treatment Note      Patient: Mason Angel   : 1950  Referring practitioner: LUANA Azevedo  Date of Initial Visit: Type: THERAPY  Noted: 10/3/2022  Today's Date: 2022  Patient seen for 14 sessions         Mason Angel reports: R shoulder pain 6/10 today; sore from last session and ball rolls increased pain.     QUICKDASH: 50       Objective     Palpation      Right   Hypertonic in the levator scapulae, rhomboids (T4-T6), upper trapezius, teres major/teres minor. Tenderness of the levator scapulae, rhomboids upper trapezius, teres major/teres minor.      Tenderness      Right Shoulder  Tenderness in the AC joint, acromion, coracoid process, medial scapula and lateral scapula.             Active Range of Motion     Right Shoulder   Flexion: 143 degrees with pain  Abduction: 135 (scaption) degrees with pain  External rotation 0°: 78 degrees with pain  BTH: C2  BTB: lower thoracic- lumbar     Passive Range of Motion      Right Shoulder   Flexion: 145 degrees with pain at end range  Abduction: 155 (scaption) degrees with pain at end range  External rotation 0°: 72 degrees with pain at end range  External rotation 45°: 78 degrees with pain at end range  External rotation 90°: 74 degrees with pain at end range  Internal rotation 45° : 71 degrees with pain at end range     Scapular Mobility      Right Shoulder   Scapular mobility: good     Joint Play      Right Shoulder  Hypomobile in the posterior capsule and inferior capsule.      Strength/Myotome Testing      R shoulder  Flexion: 4  ABD: 4-  ER: 4-  IR: 4    See Exercise, Manual, and Modality Logs for complete treatment.       Assessment & Plan     Assessment  Impairments: abnormal muscle firing, abnormal muscle tone, abnormal or restricted ROM, activity intolerance, impaired physical strength, lacks appropriate home exercise program and pain with function  Functional Limitations: carrying objects, lifting, sleeping,  pulling, pushing, uncomfortable because of pain, moving in bed, reaching behind back, reaching overhead and unable to perform repetitive tasks  Assessment details: Mason Angel is a 71 y.o. year-old male referred to physical therapy s/p R RTC repair with bio inductive implant and debridement along with distal clavicle excision (9/9/22). Pt is 10 weeks into his protocol; recently ended oral steroid with minimal changes in pain, hopeful that he will get steroid injection at next follow up for pain control. Pt with improving PROM to WNL and still progressing full AROM. Pt lacking full strength and painful with resistance testing but improved since assessment 2 weeks ago. Pt main barrier is pain with initiation of STM to medial and lateral scapula along with continuation of US; added cross body stretch with improved pain post tx today. Patient is appropriate for skilled physical therapy in order to reduce pain and increase ease with daily mobility. During therapy, pt educated on anatomy, goal of interventions, sleeping position/brace wear, following surgical protocol for safety and use of heat to shoulder blade and ice to shoulder joint for pain control to promote healthy lifestyle and improve quality of life. Pt has follow up with surgeon 12/7/22.  Prognosis: good    Goals  Plan Goals: STG: ~6 weeks  1. Pt will demonstrate R shoulder PROM in flexion and scaption of >140 degrees in preparation for active overhead activity-MET  2. Pt will demonstrate R shoulder PROM in ER to at least 60 degrees-MET  3. Pt will be compliant brace wear and stretches out of sling at least 3 times per day-MET  4. Decrease R UE tenderness with palpation to minimal over the acromion and supraspinatus tendon to be able to sleep comfortably in a bed-MET    LTG: ~12 weeks  1. Pt will demonstrate R shoulder AROM WNL and minimal pain to be able to complete overhead reaching/lifting-PROGRESSING  2. Pt will be able to reach BTB lumbar spine using  RUE-MET  3. Pt will improve R shoulder strength to at least 4/5 in flexion, ABD, ER and IR to improve quality of life-PROGRESSING  4. Pt will improve QuickDASH score to 20 or less to improve subjective function of shoulder with ADLs-PROGRESSING  5.  Pt will be able to return to fishing using RUE without pain-NOT MET      Plan  Therapy options: will be seen for skilled therapy services  Planned modality interventions: cryotherapy, electrical stimulation/Russian stimulation, iontophoresis, TENS, thermotherapy (hydrocollator packs), ultrasound and dry needling  Planned therapy interventions: ADL retraining, balance/weight-bearing training, body mechanics training, fine motor coordination training, flexibility, functional ROM exercises, home exercise program, IADL retraining, joint mobilization, manual therapy, neuromuscular re-education, postural training, soft tissue mobilization, spinal/joint mobilization, strengthening, stretching and therapeutic activities  Treatment plan discussed with: patient  Plan details: 2 times per week for 6 weeks             Timed:  Manual Therapy:    20     mins  45602;  Therapeutic Exercise:    20     mins  65159;     Neuromuscular Dewey:    -    mins  62226;    Therapeutic Activity:     10     mins  58410;     Gait Training:      -     mins  62559;     Ultrasound:     8     mins  93424; not billed due to divided time     Untimed:  Electrical Stimulation:    -     mins  65751 ( );  Mechanical Traction:    -     mins  40496;   Dry needling:      -     mins  20560/20561    Timed Treatment:   58   mins   Total Treatment:     70   mins    Rosalinda Cline PT  Physical Therapist  License #: [id number]

## 2022-11-21 ENCOUNTER — TREATMENT (OUTPATIENT)
Dept: PHYSICAL THERAPY | Facility: CLINIC | Age: 72
End: 2022-11-21

## 2022-11-21 DIAGNOSIS — M25.511 ACUTE PAIN OF RIGHT SHOULDER: ICD-10-CM

## 2022-11-21 DIAGNOSIS — M62.89 MUSCLE TIGHTNESS: ICD-10-CM

## 2022-11-21 DIAGNOSIS — M25.619 LIMITED RANGE OF MOTION (ROM) OF SHOULDER: ICD-10-CM

## 2022-11-21 DIAGNOSIS — R29.3 POOR POSTURE: ICD-10-CM

## 2022-11-21 DIAGNOSIS — Z98.890 S/P RIGHT ROTATOR CUFF REPAIR: Primary | ICD-10-CM

## 2022-11-21 PROCEDURE — 97140 MANUAL THERAPY 1/> REGIONS: CPT | Performed by: PHYSICAL THERAPIST

## 2022-11-21 PROCEDURE — 97110 THERAPEUTIC EXERCISES: CPT | Performed by: PHYSICAL THERAPIST

## 2022-11-21 PROCEDURE — 97035 APP MDLTY 1+ULTRASOUND EA 15: CPT | Performed by: PHYSICAL THERAPIST

## 2022-11-21 NOTE — PROGRESS NOTES
Physical Therapy Daily Treatment Note      Patient: Mason Angel   : 1950  Referring practitioner: LUANA Azevedo  Date of Initial Visit: Type: THERAPY  Noted: 10/3/2022  Today's Date: 2022  Patient seen for 15 sessions         Mason Angel reports: pain about 6/10 today in R shoulder; better after massage last session.      Objective   See Exercise, Manual, and Modality Logs for complete treatment.       Assessment/Plan  Pt with continues tightness at posterior capsule and down into lateral shoulder blade; US and STM focused on lateral scapular area. Continue to progress ROM of R shoulder; pain with AROM in flexion, ABD, ER at end ranges; most pain passively with 90/90 ER. Plan to continue to progress strength per protocol.       Progress per Plan of Care and Progress strengthening /stabilization /functional activity           Timed:  Manual Therapy:    15     mins  39604;  Therapeutic Exercise:    32     mins  48792;     Neuromuscular Dewey:    -    mins  40606;    Therapeutic Activity:     -     mins  30412;     Gait Training:      -     mins  95556;     Ultrasound:     8     mins  81462;      Untimed:  Electrical Stimulation:    -     mins  35552 ( );  Mechanical Traction:    -     mins  65446;   Dry needling:      -     mins  78994/    Timed Treatment:   55   mins   Total Treatment:     65   mins  Rosalinda Cline PT  Physical Therapist    License #: [id number]

## 2022-11-23 ENCOUNTER — TREATMENT (OUTPATIENT)
Dept: PHYSICAL THERAPY | Facility: CLINIC | Age: 72
End: 2022-11-23

## 2022-11-23 DIAGNOSIS — M62.89 MUSCLE TIGHTNESS: ICD-10-CM

## 2022-11-23 DIAGNOSIS — M25.511 ACUTE PAIN OF RIGHT SHOULDER: ICD-10-CM

## 2022-11-23 DIAGNOSIS — M25.619 LIMITED RANGE OF MOTION (ROM) OF SHOULDER: ICD-10-CM

## 2022-11-23 DIAGNOSIS — R29.3 POOR POSTURE: ICD-10-CM

## 2022-11-23 DIAGNOSIS — Z98.890 S/P RIGHT ROTATOR CUFF REPAIR: Primary | ICD-10-CM

## 2022-11-23 PROCEDURE — 97110 THERAPEUTIC EXERCISES: CPT | Performed by: PHYSICAL THERAPIST

## 2022-11-23 PROCEDURE — 97035 APP MDLTY 1+ULTRASOUND EA 15: CPT | Performed by: PHYSICAL THERAPIST

## 2022-11-23 PROCEDURE — 97140 MANUAL THERAPY 1/> REGIONS: CPT | Performed by: PHYSICAL THERAPIST

## 2022-11-23 NOTE — PROGRESS NOTES
Physical Therapy Daily Treatment Note      Patient: Mason Angel   : 1950  Referring practitioner: LUANA Azevedo  Date of Initial Visit: Type: THERAPY  Noted: 10/3/2022  Today's Date: 2022  Patient seen for 16 sessions         Mason Angel reports: R shoulder pain 4-5/10 and sleeping a little better.            Objective   See Exercise, Manual, and Modality Logs for complete treatment.       Assessment/Plan  Pt is 10 weeks s/p R shoulder surgery with decreased muscle tension around R scapula with STM and US and improved pain the past week. Pt with improved AROM in flexion, scaption and ER; able to progress resistance band with ER today without pain. Plan to continue to progress per protocol.         Progress per Plan of Care and Progress strengthening /stabilization /functional activity           Timed:  Manual Therapy:    15     mins  11862;  Therapeutic Exercise:    27     mins  29023;     Neuromuscular Dewey:    -    mins  55495;    Therapeutic Activity:     -     mins  89037;     Gait Training:      -     mins  95603;     Ultrasound:     8     mins  16661;      Untimed:  Electrical Stimulation:    -     mins  43753 ( );  Mechanical Traction:    -     mins  58452;   Dry needling:      -     mins  04514/    Timed Treatment:   42   mins   Total Treatment:     60   mins  Rosalinda Cline PT  Physical Therapist    License #: [id number]

## 2022-11-28 ENCOUNTER — TREATMENT (OUTPATIENT)
Dept: PHYSICAL THERAPY | Facility: CLINIC | Age: 72
End: 2022-11-28

## 2022-11-28 DIAGNOSIS — M25.619 LIMITED RANGE OF MOTION (ROM) OF SHOULDER: ICD-10-CM

## 2022-11-28 DIAGNOSIS — Z98.890 S/P RIGHT ROTATOR CUFF REPAIR: Primary | ICD-10-CM

## 2022-11-28 DIAGNOSIS — R29.3 POOR POSTURE: ICD-10-CM

## 2022-11-28 DIAGNOSIS — M25.511 ACUTE PAIN OF RIGHT SHOULDER: ICD-10-CM

## 2022-11-28 DIAGNOSIS — M62.89 MUSCLE TIGHTNESS: ICD-10-CM

## 2022-11-28 PROCEDURE — 97110 THERAPEUTIC EXERCISES: CPT | Performed by: PHYSICAL THERAPIST

## 2022-11-28 PROCEDURE — 97140 MANUAL THERAPY 1/> REGIONS: CPT | Performed by: PHYSICAL THERAPIST

## 2022-11-28 NOTE — PROGRESS NOTES
Physical Therapy Daily Treatment Note      Patient: Mason Angel   : 1950  Referring practitioner: LUANA Azevedo  Date of Initial Visit: Type: THERAPY  Noted: 10/3/2022  Today's Date: 2022  Patient seen for 17 sessions         Mason Angel reports: R shoulder pain 4/10 today was feeling good but must have slept on it wrong this weekend         Objective   See Exercise, Manual, and Modality Logs for complete treatment.       Assessment/Plan  Progressed resistance bands with rows, shoulder ext, bicep and tricep strengthening today. Continued manual PROM and joint mobs to improve mobility; no pain until end range. Continued US with muscle tightness posterior shoulder down into lateral scapular area. Improving strength and decreased pain with functional mobility. PROM WNL; next follow up  with surgeon.        Progress per Plan of Care and Progress strengthening /stabilization /functional activity           Timed:  Manual Therapy:    12     mins  61074;  Therapeutic Exercise:    28     mins  69810;     Neuromuscular Dewey:    -    mins  18404;    Therapeutic Activity:     -     mins  89361;     Gait Training:      -     mins  22833;     Ultrasound:     -     mins  58849;      Untimed:  Electrical Stimulation:    -     mins  32758 ( );  Mechanical Traction:    -     mins  79989;   Dry needling:      -     mins  31094/    Timed Treatment:   40   mins   Total Treatment:     50   mins    Rosalinda Cline PT  Physical Therapist    License #: [id number]

## 2022-12-02 ENCOUNTER — TREATMENT (OUTPATIENT)
Dept: PHYSICAL THERAPY | Facility: CLINIC | Age: 72
End: 2022-12-02

## 2022-12-02 DIAGNOSIS — M25.511 ACUTE PAIN OF RIGHT SHOULDER: ICD-10-CM

## 2022-12-02 DIAGNOSIS — Z98.890 S/P RIGHT ROTATOR CUFF REPAIR: Primary | ICD-10-CM

## 2022-12-02 DIAGNOSIS — M62.89 MUSCLE TIGHTNESS: ICD-10-CM

## 2022-12-02 DIAGNOSIS — M25.619 LIMITED RANGE OF MOTION (ROM) OF SHOULDER: ICD-10-CM

## 2022-12-02 DIAGNOSIS — R29.3 POOR POSTURE: ICD-10-CM

## 2022-12-02 PROCEDURE — 97140 MANUAL THERAPY 1/> REGIONS: CPT | Performed by: PHYSICAL THERAPIST

## 2022-12-02 PROCEDURE — 97110 THERAPEUTIC EXERCISES: CPT | Performed by: PHYSICAL THERAPIST

## 2022-12-02 PROCEDURE — 97035 APP MDLTY 1+ULTRASOUND EA 15: CPT | Performed by: PHYSICAL THERAPIST

## 2022-12-02 NOTE — PROGRESS NOTES
Physical Therapy Daily Treatment Note      Patient: Mason Angel   : 1950  Referring practitioner: LUANA Azevedo  Date of Initial Visit: Type: THERAPY  Noted: 10/3/2022  Today's Date: 2022  Patient seen for 18 sessions         Mason Angel reports:he thinks he slept on his R side last night; stiff this morning. Pain 4/10.            Objective   See Exercise, Manual, and Modality Logs for complete treatment.       Assessment/Plan  Progressed to table push ups and ER with green TB today with noted difficulty and weakness with ER. Cues for push up technique. Increased reps of resisted PNF today; most difficulty with flexion D1/D2; cues for technique throughout. Improving muscle tightness along medial and lateral scapula with decreased tenderness to palpation and during US. Plan to continue to progress per protocol.        Progress per Plan of Care and Progress strengthening /stabilization /functional activity           Timed:  Manual Therapy:    12     mins  72336;  Therapeutic Exercise:    27     mins  89538;   Neuromuscular Dewey:    -    mins  90274;    Therapeutic Activity:     -     mins  25529;     Gait Training:      -     mins  62512;     Ultrasound:     8     mins  32556;      Untimed:  Electrical Stimulation:    -     mins  43419 ( );  Mechanical Traction:    -     mins  79733;   Dry needling:      -     mins  67428/    Timed Treatment:   47   mins   Total Treatment:     50   mins    Rosalinda Cline PT  Physical Therapist    License #: [id number]

## 2022-12-05 ENCOUNTER — TREATMENT (OUTPATIENT)
Dept: PHYSICAL THERAPY | Facility: CLINIC | Age: 72
End: 2022-12-05

## 2022-12-05 DIAGNOSIS — R29.3 POOR POSTURE: ICD-10-CM

## 2022-12-05 DIAGNOSIS — M25.511 ACUTE PAIN OF RIGHT SHOULDER: ICD-10-CM

## 2022-12-05 DIAGNOSIS — Z98.890 S/P RIGHT ROTATOR CUFF REPAIR: Primary | ICD-10-CM

## 2022-12-05 DIAGNOSIS — M62.89 MUSCLE TIGHTNESS: ICD-10-CM

## 2022-12-05 DIAGNOSIS — M25.619 LIMITED RANGE OF MOTION (ROM) OF SHOULDER: ICD-10-CM

## 2022-12-05 PROCEDURE — 97035 APP MDLTY 1+ULTRASOUND EA 15: CPT | Performed by: PHYSICAL THERAPIST

## 2022-12-05 PROCEDURE — 97530 THERAPEUTIC ACTIVITIES: CPT | Performed by: PHYSICAL THERAPIST

## 2022-12-05 PROCEDURE — 97140 MANUAL THERAPY 1/> REGIONS: CPT | Performed by: PHYSICAL THERAPIST

## 2022-12-05 PROCEDURE — 97110 THERAPEUTIC EXERCISES: CPT | Performed by: PHYSICAL THERAPIST

## 2022-12-05 NOTE — PROGRESS NOTES
Re-Assessment / Re-Certification        Patient: Mason Angel   : 1950  Diagnosis/ICD-10 Code:  S/P right rotator cuff repair [Z98.890]  Referring practitioner: LUANA Azevedo  Date of Initial Visit: Type: THERAPY  Noted: 10/3/2022  Today's Date: 2022  Patient seen for 19 sessions      Subjective:   Mason Angel reports: sore from last session manual stretches; 8/10 pain at start of session, 4/10 pain at end of session  Subjective Questionnaire: QuickDASH: 40.91  Clinical Progress: improved  Home Program Compliance: Yes  Treatment has included: therapeutic exercise, neuromuscular re-education, manual therapy, therapeutic activity, electrical stimulation, ultrasound, moist heat and cryotherapy      Objective     Palpation      Right   Hypertonic in the levator scapulae, rhomboids (T4-T6), upper trapezius, teres major/teres minor. Tenderness of the levator scapulae, rhomboids upper trapezius, teres major/teres minor.      Tenderness      Right Shoulder  Tenderness in the AC joint, acromion, medial scapula and lateral scapula.       Active Range of Motion     Right Shoulder   Flexion: 145 degrees with pain  Abduction: 167 (scaption) degrees with pain  External rotation 0°: 84 degrees with pain  BTH: T2  BTB: lower thoracic- lumbar     Passive Range of Motion      Right Shoulder   Flexion: 160 degrees with pain at end range  Abduction: 165 (scaption) degrees with pain at end range  External rotation 0°: 78 degrees with pain at end range  External rotation 45°: 81 degrees with pain at end range  External rotation 90°: 83 degrees with pain at end range  Internal rotation 45° : 75 degrees with pain at end range     Scapular Mobility      Right Shoulder   Scapular mobility: good     Joint Play      Right Shoulder  Hypomobile in the posterior capsule and inferior capsule.      Strength/Myotome Testing      R shoulder  Flexion: 4, limited by pain  ABD: 4, limited by pain  ER: 4-, limited by  pain  IR: 5     Assessment & Plan     Assessment  Impairments: abnormal muscle firing, abnormal muscle tone, abnormal or restricted ROM, activity intolerance, impaired physical strength, lacks appropriate home exercise program and pain with function  Functional Limitations: lifting, sleeping, pulling, uncomfortable because of pain and unable to perform repetitive tasks  Assessment details: Mason Angel is a 71 year-old male referred to physical therapy s/p R RTC repair with bio inductive implant and debridement along with distal clavicle excision (9/9/22). Pt is 12 weeks into his protocol; mostly limited by pain and scapular muscle tightness. Pt with improving PROM/AROM WNL but still painful. Pt lacking full strength and painful with resistance testing but improving. Pt reports difficulty with lifting and pulling items but improved functional use of R shoulder with grooming, dressing, reaching. Patient is appropriate for skilled physical therapy in order to reduce pain and increase ease with daily mobility. During therapy, pt educated on anatomy, goal of interventions, sleeping position/brace wear, following surgical protocol for safety and use of heat to shoulder blade and ice to shoulder joint for pain control to promote healthy lifestyle and improve quality of life. Pt has follow up with surgeon 12/7/22.  Prognosis: good    Goals  Plan Goals: STG: ~6 weeks  1. Pt will demonstrate R shoulder PROM in flexion and scaption of >140 degrees in preparation for active overhead activity-MET  2. Pt will demonstrate R shoulder PROM in ER to at least 60 degrees-MET  3. Pt will be compliant brace wear and stretches out of sling at least 3 times per day-MET  4. Decrease R UE tenderness with palpation to minimal over the acromion and supraspinatus tendon to be able to sleep comfortably in a bed-MET    LTG: ~12 weeks  1. Pt will demonstrate R shoulder AROM WNL and minimal pain to be able to complete overhead  reaching/lifting-PROGRESSING  2. Pt will be able to reach BTB lumbar spine using RUE-MET  3. Pt will improve R shoulder strength to at least 4/5 in flexion, ABD, ER and IR to improve quality of life-PROGRESSING  4. Pt will improve QuickDASH score to 20 or less to improve subjective function of shoulder with ADLs-PROGRESSING  5.  Pt will be able to return to fishing using RUE without pain-NOT MET      Plan  Therapy options: will be seen for skilled therapy services  Planned modality interventions: cryotherapy, electrical stimulation/Russian stimulation, iontophoresis, TENS, thermotherapy (hydrocollator packs), ultrasound and dry needling  Planned therapy interventions: ADL retraining, balance/weight-bearing training, body mechanics training, fine motor coordination training, flexibility, functional ROM exercises, home exercise program, IADL retraining, joint mobilization, manual therapy, neuromuscular re-education, postural training, soft tissue mobilization, spinal/joint mobilization, strengthening, stretching and therapeutic activities  Treatment plan discussed with: patient  Plan details: 2 times per week for 4 weeks        Visit Diagnoses:    ICD-10-CM ICD-9-CM   1. S/P right rotator cuff repair  Z98.890 V45.89   2. Acute pain of right shoulder  M25.511 719.41   3. Muscle tightness  M62.89 728.9   4. Limited range of motion (ROM) of shoulder  M25.619 719.51   5. Poor posture  R29.3 781.92         PT Signature: Rosalinda Cline PT  [place] license: [id number]      Based upon review of the patient's progress and continued therapy plan, it is my medical opinion that Mason Angel should continue physical therapy treatment at [facility]  [address] [place] [id number] [phone].    Signature: __________________________________  Raysa Page APRN    Timed:  Manual Therapy:    15     mins  52843;  Therapeutic Exercise:    23     mins  08984;     Neuromuscular  Dewey:    -    mins  45693;    Therapeutic Activity:     8     mins  02080;     Gait Training:      -     mins  97918;     Ultrasound:     8     mins  20746;    Electrical Stimulation:    -     mins  57401 ( );    Untimed:  Electrical Stimulation:    -     mins  04887 ( );  Mechanical Traction:    -     mins  81245; \  Dry needling:      -     mins  20560/20561    Timed Treatment:   54   mins   Total Treatment:     70   mins

## 2022-12-07 ENCOUNTER — OFFICE VISIT (OUTPATIENT)
Dept: ORTHOPEDIC SURGERY | Facility: CLINIC | Age: 72
End: 2022-12-07

## 2022-12-07 VITALS — WEIGHT: 152 LBS | HEIGHT: 67 IN | BODY MASS INDEX: 23.86 KG/M2

## 2022-12-07 DIAGNOSIS — M19.011 PRIMARY OSTEOARTHRITIS OF RIGHT SHOULDER: ICD-10-CM

## 2022-12-07 DIAGNOSIS — Z98.890 STATUS POST ARTHROSCOPY OF SHOULDER: Primary | ICD-10-CM

## 2022-12-07 DIAGNOSIS — M79.672 LEFT FOOT PAIN: ICD-10-CM

## 2022-12-07 PROCEDURE — 99024 POSTOP FOLLOW-UP VISIT: CPT | Performed by: ORTHOPAEDIC SURGERY

## 2022-12-07 RX ORDER — HYDROCODONE BITARTRATE AND ACETAMINOPHEN 10; 325 MG/1; MG/1
1 TABLET ORAL EVERY 4 HOURS PRN
Qty: 60 TABLET | Refills: 0 | Status: SHIPPED | OUTPATIENT
Start: 2022-12-07

## 2022-12-07 RX ORDER — CELECOXIB 200 MG/1
200 CAPSULE ORAL 2 TIMES DAILY
Qty: 60 CAPSULE | Refills: 0 | Status: SHIPPED | OUTPATIENT
Start: 2022-12-07 | End: 2023-01-09

## 2022-12-07 RX ADMIN — LIDOCAINE HYDROCHLORIDE 4 ML: 10 INJECTION, SOLUTION EPIDURAL; INFILTRATION; INTRACAUDAL; PERINEURAL at 11:09

## 2022-12-07 RX ADMIN — TRIAMCINOLONE ACETONIDE 80 MG: 40 INJECTION, SUSPENSION INTRA-ARTICULAR; INTRAMUSCULAR at 11:09

## 2022-12-07 NOTE — PROGRESS NOTES
CC: F/u s/p right shoulder rotator cuff revision repair and distal clavicle excision  DOS 9/9/2022     Interval History: Patient returns to clinic for follow-up of right shoulder right shoulder arthroscopy.    The patient is doing well overall, and notes mild improvement in his symptoms. He reports attending physical therapy 2 times weekly. He notes significant pain that started on Friday, 12/03/2022. The patient reports difficulty sleeping due to pain. He denies use of anti-inflammatories. He notes mild improvement with [place].    The patient reports previous viscosupplementation injection in his knee.    The patient reports a history of left foot pain. He notes redness and stiffness within the last month. He denies seeing a neurologist.         Exam:              Right shoulder- incisions well healed              Maximal tenderness to palpation as along anterior posterior glenohumeral joint line with mild crepitus to right shoulder on motion.   FF- Active- 155 degrees   Passive- 160 degrees   Strength- 4/5   ER- Active- 60 degrees   Strength- 4/5   Belly press test strength- 4+/5   Empty can test- Positive   Neer's sign- Mildly positive   [place]- Mildly positive              Positive sensation all distributions right hand and proximal lateral aspect arm, positive deltoid  firing              Cap refill < 3 seconds, radial pulse 2+              Positive deltoid firing              Flex/extend fingers/thumb/wrist with 4+/5 strength, positive thumbs up, okay sign, cross finger  adduction and abduction against resistance                REHAB:  Will place patient on standard rotator cuff repair protocol, sling x4 weeks, begin physical therapy at week 3.  Avoid crossarm activities given distal clavicle excision.     Impression: s/p right shoulder rotator cuff repair and distal clavicle excision                Plan:    Large Joint Arthrocentesis: R glenohumeral  Date/Time: 12/7/2022 11:09 AM  Consent given by:  patient  Site marked: site marked  Timeout: Immediately prior to procedure a time out was called to verify the correct patient, procedure, equipment, support staff and site/side marked as required   Supporting Documentation  Indications: pain   Procedure Details  Location: shoulder - R glenohumeral  Preparation: Patient was prepped and draped in the usual sterile fashion  Needle size: 22 G  Approach: anterior  Medications administered: 80 mg triamcinolone acetonide 40 MG/ML; 4 mL lidocaine PF 1% 1 %  Patient tolerance: patient tolerated the procedure well with no immediate complications          1. I discussedd treatment options at length with patient at today's visit.  2. The patient is still having some pain and tenderness that seems to be both in his subacromial space, but mostly it is in his glenohumeral joint space at this point in time. We discussed options at length. He wanted to proceed with glenohumeral injection today to try to help with his symptoms.  3. Patient would like to proceed with cortisone injection today to the right shoulder glenohumeral joint. I recommended limited use of affected extremity for the next 24 hours to only essential activities other than work on general active and passive motion. Recommended supplementing with ice and soft tissue massage. Discussed with patient that they should see results in 5-7 days, if no improvement in 5-6 weeks I have asked them to call the office to review other options. Patient should call office immediately if they notice redness, warmth, fevers, chills, or residual numbness or tingling for greater than 6 hours after injection.  4. The patient will continue with therapy to work on range of motion and strength. We may need to moderate on his therapy so we do not exacerbate some of the underlying issues creating pain for him at this point in time.  5. Follow-up 6 weeks for reevaluation of his shoulder.  6. He asked about his left foot. I have given him a  referral for   for evaluation from podiatry.      Transcribed from ambient dictation for Shalom Montaño MD by Arianne Christina.  12/07/22   11:47 EST    Patient or patient representative verbalized consent to the visit recording.  I have personally performed the services described in this document as transcribed by the above individual, and it is both accurate and complete.

## 2022-12-09 ENCOUNTER — TREATMENT (OUTPATIENT)
Dept: PHYSICAL THERAPY | Facility: CLINIC | Age: 72
End: 2022-12-09

## 2022-12-09 DIAGNOSIS — M25.619 LIMITED RANGE OF MOTION (ROM) OF SHOULDER: ICD-10-CM

## 2022-12-09 DIAGNOSIS — R29.3 POOR POSTURE: ICD-10-CM

## 2022-12-09 DIAGNOSIS — M25.511 ACUTE PAIN OF RIGHT SHOULDER: ICD-10-CM

## 2022-12-09 DIAGNOSIS — Z98.890 S/P RIGHT ROTATOR CUFF REPAIR: Primary | ICD-10-CM

## 2022-12-09 DIAGNOSIS — M62.89 MUSCLE TIGHTNESS: ICD-10-CM

## 2022-12-09 PROCEDURE — 97110 THERAPEUTIC EXERCISES: CPT | Performed by: PHYSICAL THERAPIST

## 2022-12-09 PROCEDURE — 97140 MANUAL THERAPY 1/> REGIONS: CPT | Performed by: PHYSICAL THERAPIST

## 2022-12-09 PROCEDURE — 97530 THERAPEUTIC ACTIVITIES: CPT | Performed by: PHYSICAL THERAPIST

## 2022-12-09 NOTE — PROGRESS NOTES
"   Physical Therapy Daily Treatment Note      Patient: Mason Angel   : 1950  Referring practitioner: LUANA Azevedo  Date of Initial Visit: Type: THERAPY  Noted: 10/3/2022  Today's Date: 2022  Patient seen for 20 sessions         Mason Angel reports: he had steroid injection  at ortho follow up; next follow up in 6 weeks. Started on celebrex with sig reduction in pain 2/10 in R shoulder today.    Per MD note: \"The patient will continue with therapy to work on range of motion and strength. We may need to moderate on his therapy so we do not exacerbate some of the underlying issues creating pain for him at this point in time.\"       Objective     PROM  Flexion: 150 degrees, no pain  ABD: 180 degrees  ER: 80 degrees at 90/90  IR: 84 degrees at 45 degrees ABD    Strength/Myotome Testing      R shoulder  Flexion: 4  ABD: 4  ER: 4+  IR: 5     See Exercise, Manual, and Modality Logs for complete treatment.       Assessment/Plan  Pt instructed to keep all exercises in a pain-free range since pain is well controlled since injection and starting a new medication. Pt's P/AROM WNL however still lacking full strength; continue to focus on pain free strengthening without exacerbation of pain. Discussed frequency of PT with pt today and he would like to continue 2x per week to work on his strength vs decrease to 1x per week for 4 weeks.        Progress per Plan of Care and Progress strengthening /stabilization /functional activity           Timed:  Manual Therapy:    12     mins  34114;  Therapeutic Exercise:    20     mins  12328;     Neuromuscular Dewey:    -    mins  23640;    Therapeutic Activity:     10     mins  44284;     Gait Training:      -     mins  12917;     Ultrasound:     8     mins  70522;      Untimed:  Electrical Stimulation:    -     mins  01174 ( );  Mechanical Traction:    -     mins  95635;   Dry needling:      -     mins  /    Timed Treatment:   50   mins "   Total Treatment:     60   mins    Rosalinda Cline PT  Physical Therapist    License #: [id number]

## 2022-12-11 RX ORDER — TRIAMCINOLONE ACETONIDE 40 MG/ML
80 INJECTION, SUSPENSION INTRA-ARTICULAR; INTRAMUSCULAR
Status: COMPLETED | OUTPATIENT
Start: 2022-12-07 | End: 2022-12-07

## 2022-12-11 RX ORDER — LIDOCAINE HYDROCHLORIDE 10 MG/ML
4 INJECTION, SOLUTION EPIDURAL; INFILTRATION; INTRACAUDAL; PERINEURAL
Status: COMPLETED | OUTPATIENT
Start: 2022-12-07 | End: 2022-12-07

## 2022-12-12 ENCOUNTER — TREATMENT (OUTPATIENT)
Dept: PHYSICAL THERAPY | Facility: CLINIC | Age: 72
End: 2022-12-12

## 2022-12-12 DIAGNOSIS — M25.511 ACUTE PAIN OF RIGHT SHOULDER: ICD-10-CM

## 2022-12-12 DIAGNOSIS — M25.619 LIMITED RANGE OF MOTION (ROM) OF SHOULDER: ICD-10-CM

## 2022-12-12 DIAGNOSIS — R29.3 POOR POSTURE: ICD-10-CM

## 2022-12-12 DIAGNOSIS — M62.89 MUSCLE TIGHTNESS: ICD-10-CM

## 2022-12-12 DIAGNOSIS — Z98.890 S/P RIGHT ROTATOR CUFF REPAIR: Primary | ICD-10-CM

## 2022-12-12 PROCEDURE — 97140 MANUAL THERAPY 1/> REGIONS: CPT | Performed by: PHYSICAL THERAPIST

## 2022-12-12 PROCEDURE — 97110 THERAPEUTIC EXERCISES: CPT | Performed by: PHYSICAL THERAPIST

## 2022-12-12 PROCEDURE — 97035 APP MDLTY 1+ULTRASOUND EA 15: CPT | Performed by: PHYSICAL THERAPIST

## 2022-12-12 NOTE — PROGRESS NOTES
Physical Therapy Daily Treatment Note      Patient: Mason Angel   : 1950  Referring practitioner: LUANA Azevedo  Date of Initial Visit: Type: THERAPY  Noted: 10/3/2022  Today's Date: 2022  Patient seen for 21 sessions         Mason Angel reports: R shoulder pain is still feeling pretty good; however shoulder blade is sore         Objective   See Exercise, Manual, and Modality Logs for complete treatment.       Assessment/Plan  Continued to progress strengthening with cues for decreased compensation at UT with R arm elevation; increased ease with functional mobility and shoulder raises, mild pain at end ranges of AROM and PROM. Plan to gradually continue to improve strength without exacerbation of pain. Continued US to medial shoulder blade with hypertonicity noted at T6-T8 rhomboids and middle/lower trap along medial scapula.        Progress per Plan of Care and Progress strengthening /stabilization /functional activity           Timed:  Manual Therapy:    10     mins  59784;  Therapeutic Exercise:    25     mins  72318;     Neuromuscular Dewey:    -    mins  53783;    Therapeutic Activity:     -     mins  06225;     Gait Training:      -     mins  83863;     Ultrasound:     8     mins  39998;      Untimed:  Electrical Stimulation:    -     mins  00240 ( );  Mechanical Traction:    -     mins  42030;   Dry needling:      -     mins  34852/    Timed Treatment:   43   mins   Total Treatment:     55   mins  Rosalinda Cline PT  Physical Therapist    License #: [id number]

## 2022-12-15 ENCOUNTER — CLINICAL SUPPORT (OUTPATIENT)
Dept: ORTHOPEDIC SURGERY | Facility: CLINIC | Age: 72
End: 2022-12-15

## 2022-12-15 ENCOUNTER — TREATMENT (OUTPATIENT)
Dept: PHYSICAL THERAPY | Facility: CLINIC | Age: 72
End: 2022-12-15

## 2022-12-15 VITALS — BODY MASS INDEX: 23.86 KG/M2 | WEIGHT: 152 LBS | HEIGHT: 67 IN

## 2022-12-15 DIAGNOSIS — M25.619 LIMITED RANGE OF MOTION (ROM) OF SHOULDER: ICD-10-CM

## 2022-12-15 DIAGNOSIS — Z98.890 S/P RIGHT ROTATOR CUFF REPAIR: Primary | ICD-10-CM

## 2022-12-15 DIAGNOSIS — M62.89 MUSCLE TIGHTNESS: ICD-10-CM

## 2022-12-15 DIAGNOSIS — R29.3 POOR POSTURE: ICD-10-CM

## 2022-12-15 DIAGNOSIS — M25.511 ACUTE PAIN OF RIGHT SHOULDER: ICD-10-CM

## 2022-12-15 DIAGNOSIS — M17.12 PRIMARY OSTEOARTHRITIS OF LEFT KNEE: Primary | ICD-10-CM

## 2022-12-15 PROCEDURE — 97035 APP MDLTY 1+ULTRASOUND EA 15: CPT | Performed by: PHYSICAL THERAPIST

## 2022-12-15 PROCEDURE — 97110 THERAPEUTIC EXERCISES: CPT | Performed by: PHYSICAL THERAPIST

## 2022-12-15 PROCEDURE — 97140 MANUAL THERAPY 1/> REGIONS: CPT | Performed by: PHYSICAL THERAPIST

## 2022-12-15 PROCEDURE — 20610 DRAIN/INJ JOINT/BURSA W/O US: CPT | Performed by: NURSE PRACTITIONER

## 2022-12-15 NOTE — PROGRESS NOTES
Large Joint Arthrocentesis: L knee  Date/Time: 12/15/2022 3:00 PM  Consent given by: patient  Site marked: site marked  Timeout: Immediately prior to procedure a time out was called to verify the correct patient, procedure, equipment, support staff and site/side marked as required   Supporting Documentation  Indications: pain   Procedure Details  Location: knee - L knee  Preparation: Patient was prepped and draped in the usual sterile fashion  Needle gauge: 21G.  Approach: superior  Medications administered: 60 mg Sodium Hyaluronate 60 MG/3ML  Patient tolerance: patient tolerated the procedure well with no immediate complications      Office provided medication.  Patient presents to clinic today for left knee viscosupplement injections.  This is the single injection of the series.  I explained details of injections as well as risks, benefits and alternatives with the patient today, had all questions answered, wished to proceed with injections. Patient was instructed to watch for signs or symptoms of infection including redness, swelling, warmth to the touch, or significant increased pain and to contact our office immediately if any of these issues were noted.

## 2022-12-15 NOTE — PROGRESS NOTES
Physical Therapy Daily Treatment Note      Patient: Mason Angel   : 1950  Referring practitioner: LUANA Azevedo  Date of Initial Visit: Type: THERAPY  Noted: 10/3/2022  Today's Date: 12/15/2022  Patient seen for 22 sessions         Mason Angel reports: R shoulder pain 2/10 today; sleeping better and overall feeling better since steroid injection and start of Celebrex         Objective   See Exercise, Manual, and Modality Logs for complete treatment.       Assessment/Plan  Pt continues to progress strength of R shoulder with increased resistance with band exercises and increased reps as able without compensation or altered technique. Pt sees surgeon 23 for next follow up; continue to progress strength to improve ease of functional use of R arm. Pt reports he still has difficulty lifting with R arm and afraid to lift anything to heavy.        Progress per Plan of Care and Progress strengthening /stabilization /functional activity           Timed:  Manual Therapy:    12     mins  27250;  Therapeutic Exercise:    33     mins  07635;     Neuromuscular Dewey:    -    mins  26660;    Therapeutic Activity:     -     mins  75055;     Gait Training:      -     mins  40484;     Ultrasound:     8     mins  70952;      Untimed:  Electrical Stimulation:    -     mins  77278 ( );  Mechanical Traction:    -     mins  52446;   Dry needling:      -     mins  85415/    Timed Treatment:   53   mins   Total Treatment:     65   mins  Rosalinda Cline PT  Physical Therapist    License #: [id number]

## 2022-12-16 ENCOUNTER — TELEPHONE (OUTPATIENT)
Dept: ORTHOPEDIC SURGERY | Facility: CLINIC | Age: 72
End: 2022-12-16

## 2022-12-16 NOTE — TELEPHONE ENCOUNTER
Caller: Mason Angel     Relationship: SELF    Best call back number: [phone]    What is your medical concern? PT WAS INFORMED BY DEEP FROM [facility] THAT [facility] WILL NOT CONTINUE COVERING PHYSICAL THERAPY FOR PT AND HE WOULD LIKE TO KNOW IS THERE IS ANY ALTERNATIVE SOLUTION TO THIS. PLEASE ADVISE

## 2022-12-17 NOTE — TELEPHONE ENCOUNTER
Did you mean to send this to Dr. Montaño? I recommend PT provide him with home exercise program. The only other option is self-pay.

## 2022-12-30 ENCOUNTER — DOCUMENTATION (OUTPATIENT)
Dept: PHYSICAL THERAPY | Facility: CLINIC | Age: 72
End: 2022-12-30

## 2022-12-30 DIAGNOSIS — R29.3 POOR POSTURE: ICD-10-CM

## 2022-12-30 DIAGNOSIS — M62.89 MUSCLE TIGHTNESS: ICD-10-CM

## 2022-12-30 DIAGNOSIS — Z98.890 S/P RIGHT ROTATOR CUFF REPAIR: Primary | ICD-10-CM

## 2022-12-30 DIAGNOSIS — M25.619 LIMITED RANGE OF MOTION (ROM) OF SHOULDER: ICD-10-CM

## 2022-12-30 DIAGNOSIS — M25.511 ACUTE PAIN OF RIGHT SHOULDER: ICD-10-CM

## 2023-01-04 ENCOUNTER — TELEPHONE (OUTPATIENT)
Dept: PHYSICAL THERAPY | Facility: CLINIC | Age: 73
End: 2023-01-04
Payer: MEDICARE

## 2023-01-04 ENCOUNTER — TELEPHONE (OUTPATIENT)
Dept: ORTHOPEDIC SURGERY | Facility: CLINIC | Age: 73
End: 2023-01-04

## 2023-01-04 NOTE — TELEPHONE ENCOUNTER
Caller: JARED YANCEY    Relationship to patient: WIFE    Best call back number: [phone]    Chief complaint: FOLLOW UP RIGHT SHOULDER    Type of visit: FOLLOW UP RIGHT SHOULDER    Requested date: ASAP    If rescheduling, when is the original appointment: 01/18/2023    Additional notes: PATIENT IS IN PAIN AND NEEDS TO BE SEEN SOONER.

## 2023-01-09 RX ORDER — CELECOXIB 200 MG/1
CAPSULE ORAL
Qty: 60 CAPSULE | Refills: 0 | Status: SHIPPED | OUTPATIENT
Start: 2023-01-09 | End: 2023-02-07 | Stop reason: SDUPTHER

## 2023-01-18 ENCOUNTER — OFFICE VISIT (OUTPATIENT)
Dept: ORTHOPEDIC SURGERY | Facility: CLINIC | Age: 73
End: 2023-01-18
Payer: MEDICARE

## 2023-01-18 VITALS — HEIGHT: 67 IN | WEIGHT: 152 LBS | BODY MASS INDEX: 23.86 KG/M2

## 2023-01-18 DIAGNOSIS — M79.2 NEUROPATHIC PAIN OF RIGHT SHOULDER: ICD-10-CM

## 2023-01-18 DIAGNOSIS — Z98.890 STATUS POST ARTHROSCOPY OF SHOULDER: Primary | ICD-10-CM

## 2023-01-18 DIAGNOSIS — M19.011 PRIMARY OSTEOARTHRITIS OF RIGHT SHOULDER: ICD-10-CM

## 2023-01-18 PROCEDURE — 73030 X-RAY EXAM OF SHOULDER: CPT | Performed by: ORTHOPAEDIC SURGERY

## 2023-01-18 PROCEDURE — 99213 OFFICE O/P EST LOW 20 MIN: CPT | Performed by: ORTHOPAEDIC SURGERY

## 2023-01-18 NOTE — PROGRESS NOTES
Subjective:     Patient ID: Mason Angel is a 72 y.o. male.    Chief Complaint:   F/u s/p right shoulder rotator cuff revision repair and distal clavicle excision  DOS 9/9/2022  Last visit-right shoulder glenohumeral injection-12/7/2022    History of Present Illness  Mason Angel returns to clinic today for evaluation of status post right shoulder rotator cuff repair.    The patient is doing well overall, and notes improvement in his symptoms. He reports occasional neuropathic, burning type pain, that he rates a 6/10 to 7/10, localized over the superior and anterior aspect of his right shoulder. He notes improvement with his aching type pain, more from his arthritis, with his intra-articular injection of the glenohumeral joint from his last visit. The patient reports he was discharged from physical therapy in 12/2022. He confirms taking gabapentin 300 mg at night. He reports occasional use of topical creams.     Social History     Occupational History   • Not on file   Tobacco Use   • Smoking status: Never   • Smokeless tobacco: Never   Vaping Use   • Vaping Use: Never used   Substance and Sexual Activity   • Alcohol use: Yes     Alcohol/week: 2.0 standard drinks     Types: 2 Cans of beer per week     Comment: daily   • Drug use: Never   • Sexual activity: Defer      Past Medical History:   Diagnosis Date   • GERD (gastroesophageal reflux disease)    • Hyperlipidemia    • Stroke (HCC) 03/2014    affected left side, limited strength left arm     Past Surgical History:   Procedure Laterality Date   • APPENDECTOMY     • BICEPS TENDONESIS SUBPECTORALIS REPAIR Right 06/25/2021    Procedure: BICEPS TENDONESIS SUBPECTORALIS MINI OPEN REPAIR;  Surgeon: Shalom Montaño MD;  Location: [facility];  Service: Orthopedics;  Laterality: Right;   • FOOT SURGERY Left     x2 crush injury w/nerve damage   • LAPAROSCOPIC CHOLECYSTECTOMY     • NASAL SEPTUM SURGERY     • SHOULDER ARTHROSCOPY W/ ROTATOR CUFF REPAIR Right  "06/25/2021    Procedure: SHOULDER ARTHROSCOPY WITH ROTATOR CUFF REPAIR;  Surgeon: Shalom Montaño MD;  Location:  [facility];  Service: Orthopedics;  Laterality: Right;  RIGHT SHOULDER ARTHROSCOPY WITH ROTATOR CUFF REPAIR   • SHOULDER ARTHROSCOPY W/ ROTATOR CUFF REPAIR Right 9/9/2022    Procedure: SHOULDER ARTHROSCOPY WITH ROTATOR CUFF,  distal clavicle excision;  Surgeon: Shalom Montaño MD;  Location:  [facility];  Service: Orthopedics;  Laterality: Right;       Family History   Problem Relation Age of Onset   • Ovarian cancer Mother    • COPD Father    • Malig Hyperthermia Neg Hx          Review of Systems        Objective:  Vitals:    01/18/23 0943   Weight: 68.9 kg (152 lb)   Height: 170.2 cm (67\")         01/18/23 0943   Weight: 68.9 kg (152 lb)     Body mass index is 23.81 kg/m².  General: No acute distress.  Resp: normal respiratory effort  Skin: no rashes or wounds; normal turgor  Psych: mood and affect appropriate; recent and remote memory intact          Ortho Exam     Right shoulder-incisions well-healed  FF- Active- 175 degrees  Strength- 4+/5  ER- Active- 50 degrees  Strength- 4+/5  Belly press test strength- 5/5   Positive sensation light touch all distributions right hand symmetric to the left    Imaging:  Right Shoulder X-Ray  Indication: Pain  AP, scapular Y, and axillary lateral views    Findings:  No fracture  No bony lesion  Normal soft tissues  Moderate glenohumeral joint space narrowing with mild reactive osteophyte formation inferior glenoid.  Mild humeral head elevation noted, widening at AC joint consistent with distal clavicle excision    Compared to prior office x-rays      Assessment:        1. Status post arthroscopy of shoulder    2. Primary osteoarthritis of right shoulder    3. Neuropathic pain of right shoulder           Plan:          1. Discussed treatment options at length with patient at today's visit.   2. At this point in time, we will send a referral back into physical " therapy primarily to focus on modalities and dry needling for some of his neuropathic pain over his shoulder at this point in time.  3. We also discussed prescription compound cream. He was interested in this, so I sent in a prescription today for that as well.  4. I will plan on following up with him in 3 months to assess for improvement or progress or sooner if needed.    Mason Angel was in agreement with plan and had all questions answered.     Orders:  Orders Placed This Encounter   Procedures   • XR Shoulder 2+ View Right   • Ambulatory Referral to Physical Therapy Evaluate and treat, Ortho       Medications:  No orders of the defined types were placed in this encounter.      Followup:  Return in about 3 months (around 4/18/2023).    Diagnoses and all orders for this visit:    1. Status post arthroscopy of shoulder (Primary)  -     XR Shoulder 2+ View Right  -     Ambulatory Referral to Physical Therapy Evaluate and treat, Ortho    2. Primary osteoarthritis of right shoulder    3. Neuropathic pain of right shoulder  -     Ambulatory Referral to Physical Therapy Evaluate and treat, Ortho      Transcribed from ambient dictation for Shalom Montaño MD by Hamida Baumann.  01/18/23   10:49 EST    Patient or patient representative verbalized consent to the visit recording.  I have personally performed the services described in this document as transcribed by the above individual, and it is both accurate and complete.      Dictated utilizing Dragon dictation

## 2023-01-30 ENCOUNTER — TREATMENT (OUTPATIENT)
Dept: PHYSICAL THERAPY | Facility: CLINIC | Age: 73
End: 2023-01-30
Payer: MEDICARE

## 2023-01-30 DIAGNOSIS — M25.511 CHRONIC RIGHT SHOULDER PAIN: Primary | ICD-10-CM

## 2023-01-30 DIAGNOSIS — G89.29 CHRONIC RIGHT SHOULDER PAIN: Primary | ICD-10-CM

## 2023-01-30 DIAGNOSIS — R29.3 POOR POSTURE: ICD-10-CM

## 2023-01-30 DIAGNOSIS — M62.89 MUSCLE TIGHTNESS: ICD-10-CM

## 2023-01-30 DIAGNOSIS — M25.619 LIMITED RANGE OF MOTION (ROM) OF SHOULDER: ICD-10-CM

## 2023-01-30 DIAGNOSIS — Z98.890 S/P RIGHT ROTATOR CUFF REPAIR: ICD-10-CM

## 2023-01-30 PROCEDURE — 97140 MANUAL THERAPY 1/> REGIONS: CPT | Performed by: PHYSICAL THERAPIST

## 2023-01-30 PROCEDURE — DRYNDLTRIAL DRY NEEDLING TRIAL: Performed by: PHYSICAL THERAPIST

## 2023-01-30 PROCEDURE — 97161 PT EVAL LOW COMPLEX 20 MIN: CPT | Performed by: PHYSICAL THERAPIST

## 2023-01-30 PROCEDURE — 97530 THERAPEUTIC ACTIVITIES: CPT | Performed by: PHYSICAL THERAPIST

## 2023-01-30 NOTE — PROGRESS NOTES
Physical Therapy Initial Evaluation and Plan of Care    [address] [facility] [address]    Patient: Mason Angel   : 1950  Diagnosis/ICD-10 Code:  Chronic right shoulder pain [M25.511, G89.29]  Referring practitioner: Shalom Montaño MD  Date of Initial Visit: 2023  Today's Date: 2023  Patient seen for 1 sessions           Subjective Questionnaire: QuickDASH: 56.82      Subjective Evaluation    History of Present Illness  Mechanism of injury: Pt with continued R shoulder pain; that comes and goes. Some days are bad up to 8/10 and some days he doesn't feel it. Pt is s/p right shoulder arthroscopy rotator cuff repair with bio inductive implant, limited debridement of glenohumeral subacromial space, distal clavicle excision, DOS 2022. Pt had to quit previous PT after surgery due to insurance denial. Pain causing him to get up at night 1-2 times per week.    Hobbies: driving, fishing, going out to eat      Patient Occupation: retired Quality of life: good    Pain  Current pain ratin  At best pain ratin  At worst pain ratin  Location: R shoulder  Quality: discomfort, sharp and tight  Relieving factors: medications, heat, change in position and ice (topical cream)  Aggravating factors: movement, lifting, overhead activity, sleeping, prolonged positioning, repetitive movement and outstretched reach  Progression: no change    Social Support  Lives with: spouse    Hand dominance: right    Diagnostic Tests  X-ray: normal    Treatments  Previous treatment: physical therapy and injection treatment (last steroid injection )  Current treatment comments: topical cream.     Patient Goals  Patient goals for therapy: decreased pain, improved balance, increased motion, increased strength, independence with ADLs/IADLs and return to sport/leisure activities             Objective          Postural Observations  Seated posture: fair  Standing posture: fair        Palpation      Right   Hypertonic in the levator scapulae, middle trapezius, pectoralis major, pectoralis minor, rhomboids, thoracic paraspinals and upper trapezius. Tenderness of the levator scapulae, rhomboids, thoracic paraspinals and upper trapezius.     Right Shoulder Comments  Right cervical paraspinals: moderate. Right levator scapulae: severe. Right middle trapezius: moderate. Right pectoralis major: mild. Right pectoralis minor: mild. Right rhomboids: moderate. Right thoracic paraspinals: moderate T4-T8. Right upper trapezius: moderate.     Cervical/Thoracic Screen   Cervical range of motion within normal limits with the following exceptions: Flexion: 65 degrees  Ext: 40 degrees  Sidebending L: 30 degrees  Sidebending R: 34 degrees  Rotation L: 65 degrees  Rotation R: 70 degrees    Neurological Testing     Sensation     Shoulder   Left Shoulder   Intact: light touch    Right Shoulder   Intact: light touch    Additional Neurological Details  Intermittent numbness R shoulder down towards elbow; no tingling    Active Range of Motion     Right Shoulder   Flexion: 120 degrees with pain  Abduction: 110 degrees with pain  External rotation 0°: 90 degrees with pain  External rotation BTH: T1   Internal rotation BTB: L1     Passive Range of Motion     Right Shoulder   Flexion: WFL and with pain  Abduction: WFL  External rotation 0°: WFL  External rotation 45°: WFL  External rotation 90°: WFL  Internal rotation 0°: WFL and with pain  Internal rotation 45°: WFL and with pain  Internal rotation 90°: WFL and with pain    Scapular Mobility     Right Shoulder   Scapular mobility: WFL  Scapular Mobility with Shoulder to 90° FF   Scapular winging: moderate    Strength/Myotome Testing     Left Shoulder   Normal muscle strength    Right Shoulder     Planes of Motion   Flexion: 4   Abduction: 4-   External rotation at 0°: 4+   Internal rotation at 0°: 5     Isolated Muscles   Lower trapezius: 3-   Middle trapezius: 3+   Rhomboids: 3+      Tests     Right Shoulder   Positive Neer's.   Negative belly press, drop arm, Hawkin's, sulcus sign, resisted supine external rotation and Mace's.           Assessment & Plan     Assessment  Impairments: abnormal muscle firing, abnormal muscle tone, abnormal or restricted ROM, activity intolerance, impaired physical strength, lacks appropriate home exercise program, pain with function and weight-bearing intolerance  Functional Limitations: carrying objects, lifting, sleeping, pulling, pushing, uncomfortable because of pain, moving in bed, reaching behind back, reaching overhead and unable to perform repetitive tasks  Assessment details: Mason Angel is a 72 y.o. year-old male referred to physical therapy for chronic R shoulder pain. He presents with a evolving clinical presentation.  He has comorbidities CVA, R RTC repair (9/9/22) but no personal factors that may affect his progress in the plan of care. Signs and symptoms are consistent with physical therapy diagnosis of impaired cervical and R shoulder ROM due to muscle tightness, poor posture, limited muscle strength and pain limiting function of R shoulder. Patient is appropriate for skilled physical therapy in order to reduce pain and increase ease with daily mobility. Per MD orders, focus on modalities and trial of dry needling today. During evaluation, pt educated on anatomy, goal of interventions, sleeping position, and body mechanics to promote healthy lifestyle and improve quality of life.  Prognosis: good    Goals  Plan Goals: STG: ~6 weeks  1. Pt will demonstrate R shoulder PROM in flexion and IR WNL and tolerable pain (2/10 or less)  2. Pt will increase cervical lateral flexion to 35 degrees to improve sleeping on his side.  3. Pt will increase cervical rotation to 75 degrees to improve ease of driving  4. Decrease right shoulder pain to a 3/10 or less with reaching and lifting items from overhead cabinet  5. Pt will be able to return to  fishing without R shoulder limitation/pain    LTG: ~12 weeks  1. Pt will be able to sleep through the night without getting up due to shoulder pain.  2. Pt improve R cervical/thoracic tenderness to palpation from severe/moderate to minimal at UT/LS and along posterior shoulder  3. Pt will improve R shoulder strength to 5/5 in flexion and ABD to improve ease with lifting   4. Pt will improve R scapular strength to 4/5 at middle and lower trap to improve strength with WB through his R arm  5. Pt will improve QuickDASH score to 20 or less to improve subjective function of shoulder with ADLs      Plan  Therapy options: will be seen for skilled therapy services  Planned modality interventions: cryotherapy, electrical stimulation/Russian stimulation, iontophoresis, TENS, thermotherapy (hydrocollator packs), ultrasound and dry needling  Planned therapy interventions: ADL retraining, balance/weight-bearing training, body mechanics training, fine motor coordination training, flexibility, functional ROM exercises, home exercise program, IADL retraining, joint mobilization, manual therapy, neuromuscular re-education, postural training, soft tissue mobilization, spinal/joint mobilization, strengthening, stretching and therapeutic activities  Treatment plan discussed with: patient  Plan details: 2 times per week for 12 weeks        Visit Diagnoses:    ICD-10-CM ICD-9-CM   1. Chronic right shoulder pain  M25.511 719.41    G89.29 338.29   2. Muscle tightness  M62.89 728.9   3. Limited range of motion (ROM) of shoulder  M25.619 719.51   4. Poor posture  R29.3 781.92   5. S/P right rotator cuff repair  Z98.890 V45.89       Timed:  Manual Therapy:    10     mins  68307;  Therapeutic Exercise:    -     mins  57809;     Neuromuscular Dewey:    -    mins  24933;    Therapeutic Activity:     10     mins  81865;     Gait Training:      -     mins  63944;     Ultrasound:     -     mins  85285;    Electrical Stimulation:    -     mins  48784  ( );    Low Eval                       20      Mins  57837  Mod Eval                        -     Mins  24716  High Eval                       -     Mins  21531    Untimed:  Electrical Stimulation:    -     mins  38225 ( );  Mechanical Traction:    -     mins  34836;   Dry Needling     15     mins 20560/20561 (Trial today)    Timed Treatment:   20   mins   Total Treatment:     70   mins    PT SIGNATURE: Rosalinda Cline PT   [place] license: [id number]  DATE TREATMENT INITIATED: 1/30/2023    Initial Certification  Certification Period: 4/30/2023  I certify that the therapy services are furnished while this patient is under my care.  The services outlined above are required by this patient, and will be reviewed every 90 days.     PHYSICIAN: Shalom Montaño MD      DATE:     Please sign and return via fax to [phone]. Thank you, [facility].

## 2023-01-30 NOTE — PROGRESS NOTES
Discharge Summary  Discharge Summary from Physical Therapy Report    Patient Information  Mason Angel  1950    Dates  PT visit: 10/3/22-12/15/22  Number of Visits: 22    Discharge Status of Patient: Insurance denied    Goals: Partially Met    Mason Angel is a 71 year-old male referred to physical therapy s/p R RTC repair with bio inductive implant and debridement along with distal clavicle excision (9/9/22). Pt is >12 weeks into his protocol; mostly limited by pain and scapular muscle tightness. Pt with improving PROM/AROM WNL but still painful. Pt lacking full strength and painful with resistance testing but improving. Pt reports difficulty with lifting and pulling items but improved functional use of R shoulder with grooming, dressing, reaching. Patient is appropriate for skilled physical therapy in order to reduce pain and increase ease with daily mobility. During therapy, pt educated on anatomy, goal of interventions, sleeping position/brace wear, following surgical protocol for safety and use of heat to shoulder blade and ice to shoulder joint for pain control to promote healthy lifestyle and improve quality of life.   Prognosis: good   Goals  Plan Goals: STG: ~6 weeks  1. Pt will demonstrate R shoulder PROM in flexion and scaption of >140 degrees in preparation for active overhead activity-MET  2. Pt will demonstrate R shoulder PROM in ER to at least 60 degrees-MET  3. Pt will be compliant brace wear and stretches out of sling at least 3 times per day-MET  4. Decrease R UE tenderness with palpation to minimal over the acromion and supraspinatus tendon to be able to sleep comfortably in a bed-MET    LTG: ~12 weeks  1. Pt will demonstrate R shoulder AROM WNL and minimal pain to be able to complete overhead reaching/lifting-PROGRESSING  2. Pt will be able to reach BTB lumbar spine using RUE-MET  3. Pt will improve R shoulder strength to at least 4/5 in flexion, ABD, ER and IR to improve quality of  life-PROGRESSING  4. Pt will improve QuickDASH score to 20 or less to improve subjective function of shoulder with ADLs-PROGRESSING  5.  Pt will be able to return to fishing using RUE without pain-NOT MET    Visit Diagnoses:    ICD-10-CM ICD-9-CM   1. S/P right rotator cuff repair  Z98.890 V45.89   2. Acute pain of right shoulder  M25.511 719.41   3. Muscle tightness  M62.89 728.9   4. Limited range of motion (ROM) of shoulder  M25.619 719.51   5. Poor posture  R29.3 781.92       Discharge Plan: Continue with current home exercise program as instructed    Date of Discharge 12/30/22        Rosalinda Cline PT  Physical Therapist

## 2023-02-06 ENCOUNTER — TREATMENT (OUTPATIENT)
Dept: PHYSICAL THERAPY | Facility: CLINIC | Age: 73
End: 2023-02-06

## 2023-02-06 DIAGNOSIS — G89.29 CHRONIC RIGHT SHOULDER PAIN: Primary | ICD-10-CM

## 2023-02-06 DIAGNOSIS — M25.511 CHRONIC RIGHT SHOULDER PAIN: Primary | ICD-10-CM

## 2023-02-06 DIAGNOSIS — R29.3 POOR POSTURE: ICD-10-CM

## 2023-02-06 DIAGNOSIS — Z98.890 S/P RIGHT ROTATOR CUFF REPAIR: ICD-10-CM

## 2023-02-06 DIAGNOSIS — M25.619 LIMITED RANGE OF MOTION (ROM) OF SHOULDER: ICD-10-CM

## 2023-02-06 DIAGNOSIS — M62.89 MUSCLE TIGHTNESS: ICD-10-CM

## 2023-02-06 PROCEDURE — 20561 NDL INSJ W/O NJX 3+ MUSC: CPT | Performed by: PHYSICAL THERAPIST

## 2023-02-06 NOTE — PROGRESS NOTES
Physical Therapy Daily Treatment Note      Patient: Mason Angel   : 1950  Referring practitioner: No ref. provider found  Date of Initial Visit: Type: THERAPY  Noted: 2023  Today's Date: 2023  Patient seen for 1 sessions         Mason Angel reports: 3/10 in R shoulder today; increased soreness following initial dry needling but then improved      Objective     R shoulder PROM  Flexion: WNL, mild pain at end range  ABD: WNL, no pain  ER: WNL, no pain  IR: WNL, mild pain at end range    See Exercise, Manual, and Modality Logs for complete treatment.       Assessment/Plan  Used threading and direct techniques, obtained consent to treat after discussing benefits and risks of dry needling. Clean needle technique and gloves used at all times. Precautions utilized for lung fields and neurovascular structures. Pt in supine and prone position for needling of cervical and R shoulder area. Positive twitch response at R UT. No adverse response noted. Manual palpation and assessment performed before, during and after needling.  Plan to continue needling 1x per week and instructed to apply heat to sore areas tonight.       Progress per Plan of Care and Progress strengthening /stabilization /functional activity           Timed:  Manual Therapy:    5     mins  13168;  Therapeutic Exercise:    -     mins  66785;     Neuromuscular Dewey:    -    mins  95739;    Therapeutic Activity:     -     mins  04734;     Gait Training:      -     mins  18565;     Ultrasound:     -     mins  60386;      Untimed:  Electrical Stimulation:    -     mins  05474 ( );  Mechanical Traction:    -     mins  76903;   Dry needlin     mins  /    Timed Treatment:   25   mins   Total Treatment:     40   mins  Rosalinda Cline PT  Physical Therapist    License #: [id number]

## 2023-02-07 RX ORDER — CELECOXIB 200 MG/1
200 CAPSULE ORAL 2 TIMES DAILY
Qty: 60 CAPSULE | Refills: 0 | Status: SHIPPED | OUTPATIENT
Start: 2023-02-07 | End: 2023-03-16

## 2023-02-07 NOTE — TELEPHONE ENCOUNTER
Rx Refill Note  Requested Prescriptions     Pending Prescriptions Disp Refills   • celecoxib (CeleBREX) 200 MG capsule 60 capsule 0     Sig: Take 1 capsule by mouth 2 (Two) Times a Day.      Last office visit with prescribing clinician: 1/18/2023      Next office visit with prescribing clinician: 3/20/2023   Last Filled:01.09.2023    DX:s/p right shoulder rotator cuff revision repair and distal clavicle excision  DOS 9/9/2022      Carlie Martínez MA  02/07/23, 09:36 EST    Previous RX pended for your approval, change or denial.     {TIP  Encounters:    {TIP  Please add Last Relevant Lab Date if appropriate:  {TIP  Is Refill Pharmacy correct?:

## 2023-02-20 ENCOUNTER — TREATMENT (OUTPATIENT)
Dept: PHYSICAL THERAPY | Facility: CLINIC | Age: 73
End: 2023-02-20

## 2023-02-20 DIAGNOSIS — M25.511 CHRONIC RIGHT SHOULDER PAIN: Primary | ICD-10-CM

## 2023-02-20 DIAGNOSIS — R29.3 POOR POSTURE: ICD-10-CM

## 2023-02-20 DIAGNOSIS — M25.619 LIMITED RANGE OF MOTION (ROM) OF SHOULDER: ICD-10-CM

## 2023-02-20 DIAGNOSIS — M62.89 MUSCLE TIGHTNESS: ICD-10-CM

## 2023-02-20 DIAGNOSIS — G89.29 CHRONIC RIGHT SHOULDER PAIN: Primary | ICD-10-CM

## 2023-02-20 PROCEDURE — 20561 NDL INSJ W/O NJX 3+ MUSC: CPT | Performed by: PHYSICAL THERAPIST

## 2023-02-20 NOTE — PROGRESS NOTES
Physical Therapy Daily Treatment Note      Patient: Mason Angel   : 1950  Referring practitioner: No ref. provider found  Date of Initial Visit: Type: THERAPY  Noted: 2023  Today's Date: 2023  Patient seen for 2 sessions         Mason Angel reports: feeling pretty good until the last couple days; hurting more the past couple days.        Objective     R shoulder PROM  Flexion: WNL, no pain  ABD: WNL, no pain  ER: WNL, no pain  IR: WNL, no pain    See Exercise, Manual, and Modality Logs for complete treatment.       Assessment/Plan  Used threading and direct techniques, obtained consent to treat after discussing benefits and risks of dry needling. Clean needle technique and gloves used at all times. Precautions utilized for lung fields and neurovascular structures. Pt in supine and prone position for needling of cervical and R shoulder area. Positive twitch response at R UT. No adverse response noted. Manual palpation and assessment performed before, during and after needling.  Plan to continue needling 1x per week and instructed to apply heat to sore areas tonight.        Progress per Plan of Care and Progress strengthening /stabilization /functional activity       Timed:  Manual Therapy:    7     mins  89524;  Therapeutic Exercise:    -     mins  76792;     Neuromuscular Dewey:    -    mins  02412;    Therapeutic Activity:     -     mins  64216;     Gait Training:      -     mins  71918;     Ultrasound:     -     mins  02532;      Untimed:  Electrical Stimulation:    -     mins  83229 ( );  Mechanical Traction:    -     mins  02504;   Dry needlin     mins  /    Timed Treatment:   27   mins   Total Treatment:     40   mins    Rosalinda Cline PT  Physical Therapist  License #: [id number]

## 2023-02-27 ENCOUNTER — TREATMENT (OUTPATIENT)
Dept: PHYSICAL THERAPY | Facility: CLINIC | Age: 73
End: 2023-02-27
Payer: MEDICARE

## 2023-02-27 DIAGNOSIS — M25.511 CHRONIC RIGHT SHOULDER PAIN: Primary | ICD-10-CM

## 2023-02-27 DIAGNOSIS — R29.3 POOR POSTURE: ICD-10-CM

## 2023-02-27 DIAGNOSIS — M62.89 MUSCLE TIGHTNESS: ICD-10-CM

## 2023-02-27 DIAGNOSIS — M25.619 LIMITED RANGE OF MOTION (ROM) OF SHOULDER: ICD-10-CM

## 2023-02-27 DIAGNOSIS — G89.29 CHRONIC RIGHT SHOULDER PAIN: Primary | ICD-10-CM

## 2023-02-27 PROCEDURE — 20561 NDL INSJ W/O NJX 3+ MUSC: CPT | Performed by: PHYSICAL THERAPIST

## 2023-02-27 PROCEDURE — 97110 THERAPEUTIC EXERCISES: CPT | Performed by: PHYSICAL THERAPIST

## 2023-02-27 PROCEDURE — 97140 MANUAL THERAPY 1/> REGIONS: CPT | Performed by: PHYSICAL THERAPIST

## 2023-02-27 PROCEDURE — 97530 THERAPEUTIC ACTIVITIES: CPT | Performed by: PHYSICAL THERAPIST

## 2023-02-27 NOTE — PROGRESS NOTES
Re-Assessment / Re-Certification        Patient: Mason Angel   : 1950  Diagnosis/ICD-10 Code:  Chronic right shoulder pain [M25.511, G89.29]  Referring practitioner: Shalom Montaño MD  Date of Initial Visit: Type: THERAPY  Noted: 2023  Today's Date: 2023  Patient seen for 2 sessions      Subjective:   Mason Angel reports: pain is 3/10 today; pain and motion is a lot better  Subjective Questionnaire: QuickDASH: 29.55  Clinical Progress: unchanged  Home Program Compliance: Yes  Treatment has included: therapeutic exercise, neuromuscular re-education, manual therapy, therapeutic activity, ultrasound, dry needling, moist heat and cryotherapy    Objective     Cervical/Thoracic Screen   Cervical range of motion within normal limits with the following exceptions:   Flexion: 65 degrees  Ext: 40 degrees  Sidebending L: 37 degrees  Sidebending R: 38 degrees  Rotation L: 68 degrees  Rotation R: 71 degrees     Neurological Testing     Pt denies numbness/tingling     Active Range of Motion     Right Shoulder   Flexion: 160 degrees   Abduction: 155 degrees with pain  External rotation 0°: 90 degrees with pain  External rotation BTH: T1   Internal rotation BTB: L1      Passive Range of Motion      Right Shoulder   Flexion: WFL  Abduction: WFL  External rotation 0°: WFL  External rotation 45°: WFL  External rotation 90°: WFL  Internal rotation 0°: WFL   Internal rotation 45°: WFL  Internal rotation 90°: WFL     Scapular Mobility      Right Shoulder   Scapular mobility: WFL  Scapular Mobility with Shoulder to 90° FF   Scapular winging: moderate     Strength/Myotome Testing     Left Shoulder   Normal muscle strength    Right Shoulder      Planes of Motion   Flexion: 4   Abduction: 4   External rotation at 0°: 4+, painful  Internal rotation at 0°: 5      Isolated Muscles   Lower trapezius: 3   Middle trapezius: 3+   Rhomboids: 3+     Assessment & Plan     Assessment  Impairments: abnormal muscle  firing, abnormal muscle tone, abnormal or restricted ROM, activity intolerance, impaired physical strength, lacks appropriate home exercise program, pain with function and weight-bearing intolerance  Functional Limitations: carrying objects, lifting, sleeping, pulling, pushing, uncomfortable because of pain, moving in bed, reaching behind back, reaching overhead and unable to perform repetitive tasks  Assessment details: Mason Angel is a 72 y.o. year-old male referred to physical therapy for chronic R shoulder pain. He presents with a evolving clinical presentation.  He has comorbidities CVA, R RTC repair (9/9/22) but no personal factors that may affect his progress in the plan of care. Pt has been doing dry needling for the past month to improve pain and presents today for dry needling with traditional ortho tx. Per MD orders, focus on modalities and dry needling with twitch response at posterior shoulder; started RTC and scapular strengthening today. Improving quickDASH score. Pt with improving cervical and R shoulder ROM and decreased pain. Pt appropriate for decreased frequency of PT to 1x per week. During therapy, pt educated on anatomy, goal of interventions, sleeping position, and body mechanics to promote healthy lifestyle and improve quality of life.  Prognosis: good    Goals  Plan Goals: STG: ~6 weeks  1. Pt will demonstrate R shoulder PROM in flexion and IR WNL and tolerable pain (2/10 or less)-PROGRESSING 3/10 today  2. Pt will increase cervical lateral flexion to 35 degrees to improve sleeping on his side.-MET  3. Pt will increase cervical rotation to 75 degrees to improve ease of driving-PROGRESSING  4. Decrease right shoulder pain to a 3/10 or less with reaching and lifting items from overhead cabinet-MET  5. Pt will be able to return to fishing without R shoulder limitation/pain-PROGRESSING    LTG: ~12 weeks  1. Pt will be able to sleep through the night without getting up due to shoulder  pain.-PROGRESSING  2. Pt improve R cervical/thoracic tenderness to palpation from severe/moderate to minimal at UT/LS and along posterior shoulder-MET  3. Pt will improve R shoulder strength to 5/5 in flexion and ABD to improve ease with lifting-PROGRESSING  4. Pt will improve R scapular strength to 4/5 at middle and lower trap to improve strength with WB through his R arm-PROGRESING  5. Pt will improve QuickDASH score to 20 or less to improve subjective function of shoulder with ADLs-PROGRESSING      Plan  Therapy options: will be seen for skilled therapy services  Planned modality interventions: cryotherapy, electrical stimulation/Russian stimulation, iontophoresis, TENS, thermotherapy (hydrocollator packs), ultrasound and dry needling  Planned therapy interventions: ADL retraining, balance/weight-bearing training, body mechanics training, fine motor coordination training, flexibility, functional ROM exercises, home exercise program, IADL retraining, joint mobilization, manual therapy, neuromuscular re-education, postural training, soft tissue mobilization, spinal/joint mobilization, strengthening, stretching and therapeutic activities  Treatment plan discussed with: patient  Plan details: 1 time per week for 10 weeks          Visit Diagnoses:    ICD-10-CM ICD-9-CM   1. Chronic right shoulder pain  M25.511 719.41    G89.29 338.29   2. Muscle tightness  M62.89 728.9   3. Limited range of motion (ROM) of shoulder  M25.619 719.51   4. Poor posture  R29.3 781.92         PT Signature: Rosalinda Cline PT  [place] license: [id number]      Timed:  Manual Therapy:    12     mins  36047;  Therapeutic Exercise:    20     mins  71334;     Neuromuscular Dewey:    -    mins  83182;    Therapeutic Activity:     8     mins  33195;     Gait Training:      -     mins  77597;     Ultrasound:     -     mins  17805;    Electrical Stimulation:    -     mins  84944 ( );    Untimed:  Electrical Stimulation:    -     mins  70154 (  );  Mechanical Traction:    -     mins  97433; \  Dry needling:      15     mins  20560/20561    Timed Treatment:   40   mins   Total Treatment:     65   mins

## 2023-03-09 ENCOUNTER — TREATMENT (OUTPATIENT)
Dept: PHYSICAL THERAPY | Facility: CLINIC | Age: 73
End: 2023-03-09
Payer: MEDICARE

## 2023-03-09 DIAGNOSIS — R29.3 POOR POSTURE: ICD-10-CM

## 2023-03-09 DIAGNOSIS — M25.511 CHRONIC RIGHT SHOULDER PAIN: Primary | ICD-10-CM

## 2023-03-09 DIAGNOSIS — M62.89 MUSCLE TIGHTNESS: ICD-10-CM

## 2023-03-09 DIAGNOSIS — M25.619 LIMITED RANGE OF MOTION (ROM) OF SHOULDER: ICD-10-CM

## 2023-03-09 DIAGNOSIS — G89.29 CHRONIC RIGHT SHOULDER PAIN: Primary | ICD-10-CM

## 2023-03-09 PROCEDURE — 97140 MANUAL THERAPY 1/> REGIONS: CPT | Performed by: PHYSICAL THERAPIST

## 2023-03-09 PROCEDURE — 97530 THERAPEUTIC ACTIVITIES: CPT | Performed by: PHYSICAL THERAPIST

## 2023-03-09 PROCEDURE — 97110 THERAPEUTIC EXERCISES: CPT | Performed by: PHYSICAL THERAPIST

## 2023-03-09 NOTE — PROGRESS NOTES
Physical Therapy Daily Treatment Note      Patient: Mason Angel   : 1950  Referring practitioner: Shalom Montaño MD  Date of Initial Visit: Type: THERAPY  Noted: 2023  Today's Date: 3/9/2023  Patient seen for 3 sessions         Mason Angel reports: he mowed the yard with his sero turn mower and trimming bushes; pain about 5/10. Might have slept on it wrong.        Objective          Palpation     Right   Hypertonic in the levator scapulae, rhomboids, thoracic paraspinals and upper trapezius. Tenderness of the levator scapulae, rhomboids, thoracic paraspinals and upper trapezius.         PROM R shoulder  Flexion: WNL, no pain  ABD: WNL, no pain  ER at 0 and 90 degrees: WNL, mild pain  IR at 0 and 90 degrees: WNL, no pain    See Exercise, Manual, and Modality Logs for complete treatment.       Assessment/Plan  Added prone scapular strengthening today; continued PROM with tightness into IR but improved with manual stretches. Hypertonicity at superior and medial border of scapula. Weakness and pain with ER. Pt to see ortho on 3/20 and going to ask for another injection; last injection on 22 with good results from last injection. Continue gentle RTC strengthening as tolerated to improve functional use of shoulder.        Progress per Plan of Care and Progress strengthening /stabilization /functional activity       Timed:  Manual Therapy:    15     mins  83005;  Therapeutic Exercise:    30     mins  39611;     Neuromuscular Dewey:    -    mins  90424;    Therapeutic Activity:     10     mins  22222;     Gait Training:      -     mins  46367;     Ultrasound:     -     mins  99535;      Untimed:  Electrical Stimulation:    -     mins  43822 ( );  Mechanical Traction:    -     mins  39119;   Dry needling:      -     mins  06980/    Timed Treatment:   55   mins   Total Treatment:     65   mins    Rosalinda Cline PT  Physical Therapist    License #: [id number]

## 2023-03-14 ENCOUNTER — TREATMENT (OUTPATIENT)
Dept: PHYSICAL THERAPY | Facility: CLINIC | Age: 73
End: 2023-03-14
Payer: MEDICARE

## 2023-03-14 DIAGNOSIS — M25.619 LIMITED RANGE OF MOTION (ROM) OF SHOULDER: ICD-10-CM

## 2023-03-14 DIAGNOSIS — Z98.890 S/P RIGHT ROTATOR CUFF REPAIR: ICD-10-CM

## 2023-03-14 DIAGNOSIS — R29.3 POOR POSTURE: ICD-10-CM

## 2023-03-14 DIAGNOSIS — M62.89 MUSCLE TIGHTNESS: ICD-10-CM

## 2023-03-14 DIAGNOSIS — G89.29 CHRONIC RIGHT SHOULDER PAIN: Primary | ICD-10-CM

## 2023-03-14 DIAGNOSIS — M25.511 CHRONIC RIGHT SHOULDER PAIN: Primary | ICD-10-CM

## 2023-03-14 PROCEDURE — 97140 MANUAL THERAPY 1/> REGIONS: CPT | Performed by: PHYSICAL THERAPIST

## 2023-03-14 PROCEDURE — 97110 THERAPEUTIC EXERCISES: CPT | Performed by: PHYSICAL THERAPIST

## 2023-03-14 NOTE — PROGRESS NOTES
Physical Therapy Daily Treatment Note      Patient: Mason Angel   : 1950  Referring practitioner: Shalom Montaño MD  Date of Initial Visit: Type: THERAPY  Noted: 2023  Today's Date: 3/14/2023  Patient seen for 4 sessions         Mason Angel reports: improving shoulder pain 5/10 but more down into shoulder blade recently         Objective   See Exercise, Manual, and Modality Logs for complete treatment.       Assessment/Plan  Added weight to supine, sidelying and prone R shoulder strengthening exercises. Added shoulder flexion at railing to stretch inferior capsule. Continue to strengthening per pt's tolerance. PROM WNL and pain at end ranges at posterior shoulder; added manual posterior capsule stretch into horizontal ADD.        Progress per Plan of Care and Progress strengthening /stabilization /functional activity         Timed:  Manual Therapy:    12     mins  41458;  Therapeutic Exercise:    20     mins  03447;     Neuromuscular Dewey:    -    mins  35236;    Therapeutic Activity:     -     mins  45085;     Gait Training:      -     mins  04334;     Ultrasound:     -     mins  93462;      Untimed:  Electrical Stimulation:    -     mins  92494 ( );  Mechanical Traction:    -     mins  72428;   Dry needling:      -     mins  78773/    Timed Treatment:   32   mins   Total Treatment:     45   mins    Rosalinda Cline PT  Physical Therapist    License #: [id number]

## 2023-03-15 NOTE — TELEPHONE ENCOUNTER
Rx Refill Note  Requested Prescriptions     Pending Prescriptions Disp Refills    celecoxib (CeleBREX) 200 MG capsule [Pharmacy Med Name: CELECOXIB 200 MG CAPSULE] 60 capsule 0     Sig: TAKE ONE CAPSULE BY MOUTH TWICE A DAY      Last office visit with prescribing clinician: 1/18/2023      Next office visit with prescribing clinician: 3/20/2023   Last Filled:02.07.2023    DX:s/p right shoulder rotator cuff revision repair and distal clavicle excision  DOS 9/9/2022      Carlie Martínez MA  03/15/23, 10:50 EDT    {TIP  Encounters:    {TIP  Please add Last Relevant Lab Date if appropriate:  {TIP  Is Refill Pharmacy correct?:

## 2023-03-16 RX ORDER — CELECOXIB 200 MG/1
CAPSULE ORAL
Qty: 60 CAPSULE | Refills: 0 | Status: SHIPPED | OUTPATIENT
Start: 2023-03-16

## 2023-03-27 ENCOUNTER — TREATMENT (OUTPATIENT)
Dept: PHYSICAL THERAPY | Facility: CLINIC | Age: 73
End: 2023-03-27
Payer: MEDICARE

## 2023-03-27 DIAGNOSIS — G89.29 CHRONIC RIGHT SHOULDER PAIN: Primary | ICD-10-CM

## 2023-03-27 DIAGNOSIS — M62.89 MUSCLE TIGHTNESS: ICD-10-CM

## 2023-03-27 DIAGNOSIS — M25.619 LIMITED RANGE OF MOTION (ROM) OF SHOULDER: ICD-10-CM

## 2023-03-27 DIAGNOSIS — M25.511 CHRONIC RIGHT SHOULDER PAIN: Primary | ICD-10-CM

## 2023-03-27 PROCEDURE — 97110 THERAPEUTIC EXERCISES: CPT | Performed by: PHYSICAL THERAPIST

## 2023-03-27 PROCEDURE — 97140 MANUAL THERAPY 1/> REGIONS: CPT | Performed by: PHYSICAL THERAPIST

## 2023-03-27 PROCEDURE — 97530 THERAPEUTIC ACTIVITIES: CPT | Performed by: PHYSICAL THERAPIST

## 2023-03-27 NOTE — PROGRESS NOTES
Re-Assessment / Re-Certification      Patient: Mason Angel   : 1950  Diagnosis/ICD-10 Code:  Chronic right shoulder pain [M25.511, G89.29]  Referring practitioner: Shalom Montaño MD  Date of Initial Visit: 3/27/2023  Today's Date: 3/27/2023  Patient seen for 5 sessions      Subjective:   Mason Angel reports: his shoulder is better, but he still has some pain in the right shoulder blade area.      Subjective Questionnaire: QuickDASH: 47.73  Clinical Progress: improved  Home Program Compliance: Yes  Treatment has included: therapeutic exercise, neuromuscular re-education, manual therapy, therapeutic activity, moist heat and pt has been instructed in a HEP    Subjective   Objective          Palpation     Right Tenderness of the biceps, levator scapulae, middle trapezius, rhomboids and upper trapezius.     Additional Palpation Details  Pt with minimal tenderness over the right anterior shoulder, UT/levator/MT area with palpation     Active Range of Motion     Right Shoulder   Flexion: 170 degrees   Abduction: 180 degrees   External rotation 90°: 80 degrees  Internal rotation 90°: 88 degrees     Additional Active Range of Motion Details  Right standing AROM shoulder flex and  degrees       Strength/Myotome Testing     Right Shoulder     Planes of Motion   Flexion: 4   Abduction: 4   External rotation at 0°: 4+   Internal rotation at 0°: 5     Isolated Muscles   Biceps: 5   Supraspinatus: 4   Triceps: 5       Assessment & Plan     Assessment    Assessment details: Pt is making progress with therapy, but continues to have right shoulder and scapular pain.  Pt has improved active right shoulder ROM compared to his last re-assessment.  Pt with good PROM all planes.  Pt still has RC weakness and continues to tolerate all strengthening exercises without c/o pain. Pt has met 3/5 STGs and 1/5 LTGs, but is showing progress towards accomplishing his other goals. Added ball on the wall and wall push up  plus to improve scapular strength for improved shoulder stability and strength to unload the RC.  Recommend continuing PT for strengthening once per week for another 4 weeks.        Progress toward previous goals: Partially Met      Goals  Plan Goals: STG: ~6 weeks  1. Pt will demonstrate R shoulder PROM in flexion and IR WNL and tolerable pain (2/10 or less). MET   2. Pt will increase cervical lateral flexion to 35 degrees to improve sleeping on his side.-MET  3. Pt will increase cervical rotation to 75 degrees to improve ease of driving-PROGRESSING  4. Decrease right shoulder pain to a 3/10 or less with reaching and lifting items from overhead cabinet-MET  5. Pt will be able to return to fishing without R shoulder limitation/pain-PROGRESSING    LTG: ~12 weeks  1. Pt will be able to sleep through the night without getting up due to shoulder pain.-PROGRESSING  2. Pt improve R cervical/thoracic tenderness to palpation from severe/moderate to minimal at UT/LS and along posterior shoulder-MET  3. Pt will improve R shoulder strength to 5/5 in flexion and ABD to improve ease with lifting-PROGRESSING  4. Pt will improve R scapular strength to 4/5 at middle and lower trap to improve strength with WB through his R arm-PROGRESING  5. Pt will improve QuickDASH score to 20 or less to improve subjective function of shoulder with ADLs-PROGRESSING      Recommendations: Continue as planned  Timeframe: 1 month  Prognosis to achieve goals: good    PT Signature: Renetta Baldwin, PT [place] # [id number]  Electronically signed 3/27/2023    .    Manual Therapy:   10      mins  30328;  Therapeutic Exercise:    22     mins  80066;     Neuromuscular Dewey:        mins  80620;    Therapeutic Activity:    18     mins  86547;     Gait Training:           mins  90056;     Ultrasound:          mins  62069;    Electrical Stimulation:         mins  13274 ( );  Traction                       ___ mins  10635    Timed Treatment:   50   mins   Total  Treatment:     60   mins

## 2023-03-30 ENCOUNTER — TREATMENT (OUTPATIENT)
Dept: PHYSICAL THERAPY | Facility: CLINIC | Age: 73
End: 2023-03-30
Payer: MEDICARE

## 2023-03-30 DIAGNOSIS — M25.619 LIMITED RANGE OF MOTION (ROM) OF SHOULDER: ICD-10-CM

## 2023-03-30 DIAGNOSIS — Z98.890 S/P RIGHT ROTATOR CUFF REPAIR: ICD-10-CM

## 2023-03-30 DIAGNOSIS — G89.29 CHRONIC RIGHT SHOULDER PAIN: Primary | ICD-10-CM

## 2023-03-30 DIAGNOSIS — M25.511 CHRONIC RIGHT SHOULDER PAIN: Primary | ICD-10-CM

## 2023-03-30 DIAGNOSIS — M62.89 MUSCLE TIGHTNESS: ICD-10-CM

## 2023-03-30 DIAGNOSIS — R29.3 POOR POSTURE: ICD-10-CM

## 2023-03-30 DIAGNOSIS — M25.511 ACUTE PAIN OF RIGHT SHOULDER: ICD-10-CM

## 2023-03-30 PROCEDURE — 97530 THERAPEUTIC ACTIVITIES: CPT | Performed by: PHYSICAL THERAPIST

## 2023-03-30 PROCEDURE — 97140 MANUAL THERAPY 1/> REGIONS: CPT | Performed by: PHYSICAL THERAPIST

## 2023-03-30 PROCEDURE — 97110 THERAPEUTIC EXERCISES: CPT | Performed by: PHYSICAL THERAPIST

## 2023-03-30 NOTE — PROGRESS NOTES
Physical Therapy Daily Treatment Note      Patient: Mason Angel   : 1950  Referring practitioner: Shalom Montaño MD  Date of Initial Visit: Type: THERAPY  Noted: 2023  Today's Date: 3/30/2023  Patient seen for 6 sessions         Mason Angel reports: he worked on his yard scooping lawn food with R hand with increased soreness today 4-10 today. Looking to get next steroid injection for pain control.          Objective   See Exercise, Manual, and Modality Logs for complete treatment.       Assessment/Plan   Added ceiling punches and supine ABCs to address strength of stability of R shoulder joint. Continue to work on scapular strengthening to reduce pain with use of R shoulder.         Progress per Plan of Care and Progress strengthening /stabilization /functional activity         Timed:  Manual Therapy:    10     mins  05888;  Therapeutic Exercise:    33     mins  60769;     Neuromuscular Dewey:    -    mins  67261;    Therapeutic Activity:     12     mins  83577;     Gait Training:      -     mins  75375;     Ultrasound:     -     mins  81750;      Untimed:  Electrical Stimulation:    -     mins  76462 ( );  Mechanical Traction:    -     mins  74088;   Dry needling:      -     mins  17084/    Timed Treatment:   55   mins   Total Treatment:     65   mins  Rosalinda Cline PT  Physical Therapist    License #: [id number]

## 2023-04-06 ENCOUNTER — TREATMENT (OUTPATIENT)
Dept: PHYSICAL THERAPY | Facility: CLINIC | Age: 73
End: 2023-04-06
Payer: MEDICARE

## 2023-04-06 DIAGNOSIS — M62.89 MUSCLE TIGHTNESS: ICD-10-CM

## 2023-04-06 DIAGNOSIS — G89.29 CHRONIC RIGHT SHOULDER PAIN: Primary | ICD-10-CM

## 2023-04-06 DIAGNOSIS — M25.511 CHRONIC RIGHT SHOULDER PAIN: Primary | ICD-10-CM

## 2023-04-06 DIAGNOSIS — M25.619 LIMITED RANGE OF MOTION (ROM) OF SHOULDER: ICD-10-CM

## 2023-04-06 DIAGNOSIS — R29.3 POOR POSTURE: ICD-10-CM

## 2023-04-06 DIAGNOSIS — M25.511 ACUTE PAIN OF RIGHT SHOULDER: ICD-10-CM

## 2023-04-06 DIAGNOSIS — Z98.890 S/P RIGHT ROTATOR CUFF REPAIR: ICD-10-CM

## 2023-04-06 PROCEDURE — 97140 MANUAL THERAPY 1/> REGIONS: CPT | Performed by: PHYSICAL THERAPIST

## 2023-04-06 PROCEDURE — 97110 THERAPEUTIC EXERCISES: CPT | Performed by: PHYSICAL THERAPIST

## 2023-04-06 NOTE — PROGRESS NOTES
Physical Therapy Daily Treatment Note      Patient: Mason Angel   : 1950  Referring practitioner: Shalom Montaño MD  Date of Initial Visit: Type: THERAPY  Noted: 2023  Today's Date: 2023  Patient seen for 7 sessions         Mason Angel reports: R shoulder pain about 5/10          Objective     PROM WNL; mild pain at end ranges    See Exercise, Manual, and Modality Logs for complete treatment.       Assessment/Plan  Pt with PROM WNL but still painful; considering getting another steroid injection for pain control. Increasing strength of R shoulder with progression of TB with ER today. Plan to increase weight with exercises next session to continue to progress strength with PROM and AROM WNL.        Progress per Plan of Care and Progress strengthening /stabilization /functional activity       Timed:  Manual Therapy:    12     mins  68678;  Therapeutic Exercise:    25     mins  18923;     Neuromuscular Dewey:    -    mins  07903;    Therapeutic Activity:     -     mins  31404;     Gait Training:      -     mins  29258;     Ultrasound:     -     mins  44881;      Untimed:  Electrical Stimulation:    -     mins  29392 ( );  Mechanical Traction:    -     mins  29017;   Dry needling:      -     mins  71497/    Timed Treatment:   37   mins   Total Treatment:     47   mins  Rosalinda Cline PT  Physical Therapist    License #: [id number]

## 2023-04-13 ENCOUNTER — TREATMENT (OUTPATIENT)
Dept: PHYSICAL THERAPY | Facility: CLINIC | Age: 73
End: 2023-04-13
Payer: MEDICARE

## 2023-04-13 DIAGNOSIS — R29.3 POOR POSTURE: ICD-10-CM

## 2023-04-13 DIAGNOSIS — M25.619 LIMITED RANGE OF MOTION (ROM) OF SHOULDER: ICD-10-CM

## 2023-04-13 DIAGNOSIS — M25.511 CHRONIC RIGHT SHOULDER PAIN: Primary | ICD-10-CM

## 2023-04-13 DIAGNOSIS — G89.29 CHRONIC RIGHT SHOULDER PAIN: Primary | ICD-10-CM

## 2023-04-13 DIAGNOSIS — M62.89 MUSCLE TIGHTNESS: ICD-10-CM

## 2023-04-13 PROCEDURE — 97110 THERAPEUTIC EXERCISES: CPT | Performed by: PHYSICAL THERAPIST

## 2023-04-13 PROCEDURE — 97530 THERAPEUTIC ACTIVITIES: CPT | Performed by: PHYSICAL THERAPIST

## 2023-04-13 PROCEDURE — 97140 MANUAL THERAPY 1/> REGIONS: CPT | Performed by: PHYSICAL THERAPIST

## 2023-04-13 NOTE — PROGRESS NOTES
Re-Assessment / Re-Certification      Patient: Mason Angel   : 1950  Diagnosis/ICD-10 Code:  Chronic right shoulder pain [M25.511, G89.29]  Referring practitioner: Shalom Montaño MD  Date of Initial Visit: Type: THERAPY  Noted: 2023  Today's Date: 2023  Patient seen for 8 sessions      Subjective:   Mason Angel reports: R shoulder pain 4/10 today; more in shoulder blade recently  Subjective Questionnaire: QuickDASH: 40.91  Clinical Progress: improved  Home Program Compliance: Yes  Treatment has included: therapeutic exercise, neuromuscular re-education, manual therapy, therapeutic activity, electrical stimulation, ultrasound, dry needling, moist heat and cryotherapy    Objective     Palpation      Right Tenderness of the biceps, levator scapulae, middle trapezius, rhomboids and upper trapezius.     Additional Palpation Details  Pt with minimal tenderness over the right anterior shoulder, UT/levator/MT area with palpation      Active Range of Motion     Cervical   Lateral flexion to the R: 30 degrees  Lateral flexion to the L: 35 degrees  Rotation to the R: 75 degrees  Rotation to the L: 75 degrees    Right Shoulder   Flexion: 170 degrees   Abduction: 180 degrees   External rotation 90°: 80 degrees; standing at 0 degrees: 62 degrees  Internal rotation 90°: 88 degrees     Additional Active Range of Motion Details  Right standing AROM shoulder flex and  degrees    Right standing AROM shoulder ER 0 degrees: 62 degrees     Strength/Myotome Testing     Right Shoulder      Planes of Motion   Flexion: 4+  mild pain  Abduction: 4   External rotation at 0°: 4+   Internal rotation at 0°: 5      Isolated Muscles   Biceps: 5   Supraspinatus: 4+   Triceps: 5       Isolated Muscles   Lower trapezius: 3-   Middle trapezius: 4  Rhomboids: 4     Assessment & Plan     Assessment    Assessment details: Mason Angel is a 72 y.o. year-old male referred to physical therapy for chronic R shoulder  pain. He presents with a evolving clinical presentation.  He has comorbidities CVA, R RTC repair (9/9/22). Pt is making progress with therapy with R shoulder ROM and strength, but continues to have right shoulder and scapular pain. Pt has attended 8 PT sessions. Pt has improved active right shoulder ROM and PROM WNL. Pt still has some shoulder, rotator cuff and scapular weakness and continues to tolerate all strengthening exercises. Pt has met 3/5 STGs and 1/5 LTGs, but is showing progress towards accomplishing his other goals. Added more resisted band strengthening today to improve shoulder flexion and ABD.  Recommend continuing PT for strengthening once per week for another 4 weeks.      Progress toward previous goals: Partially Met        Goals  Plan Goals: STG: ~6 weeks  1. Pt will demonstrate R shoulder PROM in flexion and IR WNL and tolerable pain (2/10 or less). MET   2. Pt will increase cervical lateral flexion to 35 degrees to improve sleeping on his side.-PROGRESSING  3. Pt will increase cervical rotation to 75 degrees to improve ease of driving-MET  4. Decrease right shoulder pain to a 3/10 or less with reaching and lifting items from overhead cabinet-MET  5. Pt will be able to return to fishing without R shoulder limitation/pain-PROGRESSING    LTG: ~12 weeks  1. Pt will be able to sleep through the night without getting up due to shoulder pain.-PROGRESSING  2. Pt improve R cervical/thoracic tenderness to palpation from severe/moderate to minimal at UT/LS and along posterior shoulder-MET  3. Pt will improve R shoulder strength to 5/5 in flexion and ABD to improve ease with lifting-PROGRESSING  4. Pt will improve R scapular strength to 4/5 at middle and lower trap to improve strength with WB through his R arm-PROGRESSING  5. Pt will improve QuickDASH score to 20 or less to improve subjective function of shoulder with ADLs-PROGRESSING        Recommendations: Continue as planned  Timeframe: 1  month  Prognosis to achieve goals: good    Visit Diagnoses:    ICD-10-CM ICD-9-CM   1. Chronic right shoulder pain  M25.511 719.41    G89.29 338.29   2. Muscle tightness  M62.89 728.9   3. Limited range of motion (ROM) of shoulder  M25.619 719.51   4. Poor posture  R29.3 781.92       PT Signature: Rosalinda Cline PT  [place] license: [id number]      Based upon review of the patient's progress and continued therapy plan, it is my medical opinion that Mason Angel should continue physical therapy treatment at [facility]  [address] [facility] [address] [phone].    Signature: __________________________________  Shalom Montaño MD    Timed:  Manual Therapy:    15     mins  89110;  Therapeutic Exercise:    20     mins  41888;     Neuromuscular Dewey:    -    mins  15140;    Therapeutic Activity:     10     mins  43450;     Gait Training:      -     mins  32038;     Ultrasound:     -     mins  00021;    Electrical Stimulation:    -     mins  50435 ( );    Untimed:  Electrical Stimulation:    -     mins  56838 ( );  Mechanical Traction:    -     mins  32256;   Dry needling:      -     mins  97481/20561    Timed Treatment:   45   mins   Total Treatment:     60   mins

## 2023-04-17 RX ORDER — CELECOXIB 200 MG/1
CAPSULE ORAL
Qty: 60 CAPSULE | Refills: 0 | Status: SHIPPED | OUTPATIENT
Start: 2023-04-17

## 2023-04-17 NOTE — TELEPHONE ENCOUNTER
Rx Refill Note  Requested Prescriptions     Pending Prescriptions Disp Refills    celecoxib (CeleBREX) 200 MG capsule [Pharmacy Med Name: CELECOXIB 200 MG CAPSULE] 60 capsule 0     Sig: TAKE ONE CAPSULE BY MOUTH TWICE A DAY      Last office visit with prescribing clinician: 1/18/2023      Next office visit with prescribing clinician: 4/20/2023   Last Filled: 03.16.2023    DX:  1. Status post arthroscopy of shoulder    2. Primary osteoarthritis of right shoulder    3. Neuropathic pain of right shoulder        Carlie Martínez MA  04/17/23, 08:58 EDT    {TIP  Encounters:    {TIP  Please add Last Relevant Lab Date if appropriate:  {TIP  Is Refill Pharmacy correct?:

## 2023-04-20 ENCOUNTER — OFFICE VISIT (OUTPATIENT)
Dept: ORTHOPEDIC SURGERY | Facility: CLINIC | Age: 73
End: 2023-04-20
Payer: MEDICARE

## 2023-04-20 DIAGNOSIS — Z98.890 STATUS POST ARTHROSCOPY OF SHOULDER: Primary | ICD-10-CM

## 2023-04-20 DIAGNOSIS — M19.011 PRIMARY OSTEOARTHRITIS OF RIGHT SHOULDER: ICD-10-CM

## 2023-04-20 RX ADMIN — TRIAMCINOLONE ACETONIDE 80 MG: 40 INJECTION, SUSPENSION INTRA-ARTICULAR; INTRAMUSCULAR at 11:33

## 2023-04-20 RX ADMIN — LIDOCAINE HYDROCHLORIDE 4 ML: 10 INJECTION, SOLUTION EPIDURAL; INFILTRATION; INTRACAUDAL; PERINEURAL at 11:33

## 2023-04-20 NOTE — PROGRESS NOTES
Subjective:     Patient ID: Mason Angel is a 72 y.o. male.    Chief Complaint:  F/u s/p right shoulder rotator cuff revision repair and distal clavicle excision  DOS 9/9/2022  Last visit-right shoulder glenohumeral injection-12/7/2022    History of Present Illness  Mason Angel returns to clinic today for evaluation of status post right reverse total shoulder arthroplasty.    The patient is doing well overall, and notes improvement in his symptoms. He is making progress with his motion and strength in his right upper extremity, but he is still having some pain localized primarily to the posterior glenohumeral joint line, as well as extending into the posterior scapula. He rates his pain as moderate in intensity. The patient had good improvement with his last injection, which was back in 12/2022. He denies any significant radiation of pain down to his arm. He is continuing physical therapy, but running out of visits at this time, so he has transitioned to a home exercise program. The patient denies any numbness or tingling. He denies any fevers, chills, or sweats.       Social History     Occupational History   • Not on file   Tobacco Use   • Smoking status: Never   • Smokeless tobacco: Never   Vaping Use   • Vaping Use: Never used   Substance and Sexual Activity   • Alcohol use: Yes     Alcohol/week: 2.0 standard drinks     Types: 2 Cans of beer per week     Comment: daily   • Drug use: Never   • Sexual activity: Defer      Past Medical History:   Diagnosis Date   • GERD (gastroesophageal reflux disease)    • Hyperlipidemia    • Stroke 03/2014    affected left side, limited strength left arm     Past Surgical History:   Procedure Laterality Date   • APPENDECTOMY     • BICEPS TENDONESIS SUBPECTORALIS REPAIR Right 06/25/2021    Procedure: BICEPS TENDONESIS SUBPECTORALIS MINI OPEN REPAIR;  Surgeon: Shalom Montaño MD;  Location: [facility];  Service: Orthopedics;  Laterality: Right;   • FOOT SURGERY Left      x2 crush injury w/nerve damage   • LAPAROSCOPIC CHOLECYSTECTOMY     • NASAL SEPTUM SURGERY     • SHOULDER ARTHROSCOPY W/ ROTATOR CUFF REPAIR Right 06/25/2021    Procedure: SHOULDER ARTHROSCOPY WITH ROTATOR CUFF REPAIR;  Surgeon: Shalom Montaño MD;  Location:  [facility];  Service: Orthopedics;  Laterality: Right;  RIGHT SHOULDER ARTHROSCOPY WITH ROTATOR CUFF REPAIR   • SHOULDER ARTHROSCOPY W/ ROTATOR CUFF REPAIR Right 9/9/2022    Procedure: SHOULDER ARTHROSCOPY WITH ROTATOR CUFF,  distal clavicle excision;  Surgeon: Shalom Montaño MD;  Location:  [facility];  Service: Orthopedics;  Laterality: Right;       Family History   Problem Relation Age of Onset   • Ovarian cancer Mother    • COPD Father    • Malig Hyperthermia Neg Hx          Review of Systems        Objective:  There were no vitals filed for this visit.  There were no vitals filed for this visit.  There is no height or weight on file to calculate BMI.  General: No acute distress.  Resp: normal respiratory effort  Skin: no rashes or wounds; normal turgor  Psych: mood and affect appropriate; recent and remote memory intact          Ortho Exam     Right shoulder-  Incision is well healed  Positive maximal tenderness is along anterior posterior glenohumeral joint line with mild crepitus noted  Active Forward Flexion- 165 degrees  Strength- 4+/5  Active External Rotation- 45 degrees  Strength- 4+/5  Positive deltoid in all 3 components    Imaging:  None today  Assessment:        1. Status post arthroscopy of shoulder    2. Primary osteoarthritis of right shoulder           Plan:  Large Joint Arthrocentesis: R glenohumeral  Date/Time: 4/20/2023 11:33 AM  Consent given by: patient  Site marked: site marked  Timeout: Immediately prior to procedure a time out was called to verify the correct patient, procedure, equipment, support staff and site/side marked as required   Supporting Documentation  Indications: pain   Procedure Details  Location: shoulder - R  glenohumeral  Preparation: Patient was prepped and draped in the usual sterile fashion  Needle size: 22 G  Approach: anterior  Medications administered: 4 mL lidocaine PF 1% 1 %; 80 mg triamcinolone acetonide 40 MG/ML  Patient tolerance: patient tolerated the procedure well with no immediate complications              1. Discussed treatment options at length with patient at today's visit.  2. At this point in time, given some moderate recurrence of pain, he would like to proceed with repeat glenohumeral injection today.  3. Patient would like to proceed with cortisone injection today to the right shoulder glenohumeral joint. Recommended limited use of affected extremity for the next 24 hours to only essential activities other than work on general active and passive motion. Recommended supplementing with ice and soft tissue massage. Discussed with patient that they should see results in 5-7 days, if no improvement in 5-6 weeks I have asked them to call the office to review other options. Patient should call office immediately if they notice redness, warmth, fevers, chills, or residual numbness or tingling for greater than 6 hours after injection.  4.   Continue with home exercise, work on range of motion and strength as tolerated.  5.   Follow up on an as needed basis. If he does have significant recurrence of symptoms, could consider repeat injection or other discussions in regards to surgical treatment with shoulder arthroplasty.    Mason Angel was in agreement with plan and had all questions answered.     Orders:  Orders Placed This Encounter   Procedures   • Large Joint Arthrocentesis: R glenohumeral       Medications:  No orders of the defined types were placed in this encounter.      Followup:  Return if symptoms worsen or fail to improve.    Diagnoses and all orders for this visit:    1. Status post arthroscopy of shoulder (Primary)    2. Primary osteoarthritis of right shoulder    Other orders  -      Large Joint Arthrocentesis: R glenohumeral          Dictated utilizing Dragon dictation     Transcribed from ambient dictation for Shalom Montaño MD by Lane Santos.  04/20/23   15:37 EDT    Patient or patient representative verbalized consent to the visit recording.  I have personally performed the services described in this document as transcribed by the above individual, and it is both accurate and complete.

## 2023-04-25 RX ORDER — LIDOCAINE HYDROCHLORIDE 10 MG/ML
4 INJECTION, SOLUTION EPIDURAL; INFILTRATION; INTRACAUDAL; PERINEURAL
Status: COMPLETED | OUTPATIENT
Start: 2023-04-20 | End: 2023-04-20

## 2023-04-25 RX ORDER — TRIAMCINOLONE ACETONIDE 40 MG/ML
80 INJECTION, SUSPENSION INTRA-ARTICULAR; INTRAMUSCULAR
Status: COMPLETED | OUTPATIENT
Start: 2023-04-20 | End: 2023-04-20

## 2023-04-27 ENCOUNTER — TREATMENT (OUTPATIENT)
Dept: PHYSICAL THERAPY | Facility: CLINIC | Age: 73
End: 2023-04-27
Payer: MEDICARE

## 2023-04-27 DIAGNOSIS — G89.29 CHRONIC RIGHT SHOULDER PAIN: Primary | ICD-10-CM

## 2023-04-27 DIAGNOSIS — M25.511 CHRONIC RIGHT SHOULDER PAIN: Primary | ICD-10-CM

## 2023-04-27 DIAGNOSIS — M25.619 LIMITED RANGE OF MOTION (ROM) OF SHOULDER: ICD-10-CM

## 2023-04-27 DIAGNOSIS — M62.89 MUSCLE TIGHTNESS: ICD-10-CM

## 2023-04-27 DIAGNOSIS — R29.3 POOR POSTURE: ICD-10-CM

## 2023-04-27 PROCEDURE — 97530 THERAPEUTIC ACTIVITIES: CPT | Performed by: PHYSICAL THERAPIST

## 2023-04-27 PROCEDURE — 97110 THERAPEUTIC EXERCISES: CPT | Performed by: PHYSICAL THERAPIST

## 2023-04-27 NOTE — PROGRESS NOTES
Physical Therapy Recertification     [facility]  [address] [place] [address]  [phone] (phone)  [phone] (fax)    Patient: Mason Angel   : 1950  Diagnosis/ICD-10 Code:  Chronic right shoulder pain [M25.511, G89.29]  Referring practitioner: Shalom Montaño MD  Date of Initial Visit: 2023  Today's Date: 2023  Patient seen for 9 sessions           Subjective Questionnaire: QuickDASH: 43.18  Clinical Progress: improved  Home Program Compliance: Yes  Treatment has included: therapeutic exercise, neuromuscular re-education, manual therapy, therapeutic activity, moist heat and pt has been instructed in a HEP    Subjective   Shoulder is better overall. Dr. Montaño released me. Also had a cortisone shot which helped.     Objective       Objective      Palpation      Right Tenderness of the biceps, levator scapulae, middle trapezius, rhomboids and upper trapezius.     Additional Palpation Details  Pt with minimal tenderness over the right anterior shoulder, UT/levator/MT area with palpation      Active Range of Motion     Right Shoulder   Flexion: 160 degrees   Abduction: 180 degrees   External rotation 90°: 75 degrees  Internal rotation 90°: 88 degrees      Strength/Myotome Testing     Right Shoulder      Planes of Motion   Flexion: 4   Abduction: 4   External rotation at 0°: 4  Internal rotation at 0°: 5      Isolated Muscles   Biceps: 5   Supraspinatus: 4   Triceps: 5      Assessment/Plan  Mason Angel has been seen for 9 physical therapy sessions for Chronic R shoulder Pain. Patient has had two RTC surgeries (most recent on in 2022).  Progress to physical therapy goals is good. Patient demonstrates good improvement in his R shoulder ROM and strength. Still some weakness noted with shoulder flexion/abduction/ER compared to L UE. Pain is still aggravating, mostly in the morning. He also notices some stiffness and discomfort when trying to  wash his back with a brush. Recent cortisone injection has improved his pain level.  He will benefit from continued skilled physical therapy to address remaining impairments and functional limitations.     Goals  Plan Goals: STG: ~6 weeks  1. Pt will demonstrate R shoulder PROM in flexion and IR WNL and tolerable pain (2/10 or less). MET   2. Pt will increase cervical lateral flexion to 35 degrees to improve sleeping on his side.-MET  3. Pt will increase cervical rotation to 75 degrees to improve ease of driving-PROGRESSING  4. Decrease right shoulder pain to a 3/10 or less with reaching and lifting items from overhead cabinet-MET  5. Pt will be able to return to fishing without R shoulder limitation/pain-PROGRESSING    LTG: ~12 weeks  1. Pt will be able to sleep through the night without getting up due to shoulder pain.-MET (better since recent injection)  2. Pt improve R cervical/thoracic tenderness to palpation from severe/moderate to minimal at UT/LS and along posterior shoulder-MET  3. Pt will improve R shoulder strength to 5/5 in flexion and ABD to improve ease with lifting-PROGRESSING  4. Pt will improve R scapular strength to 4/5 at middle and lower trap to improve strength with WB through his R arm-PROGRESING  5. Pt will improve QuickDASH score to 20 or less to improve subjective function of shoulder with ADLs-PROGRESSING        Recommendations: Continue as planned  Timeframe: 1 month : 1-2x/week  Prognosis to achieve goals: good        Timed:  Manual Therapy:         mins  39761;  Therapeutic Exercise:    20     mins  23106;     Neuromuscular Dewey:        mins  25871;    Therapeutic Activity:     10     mins  23526;     Gait Training:           mins  47690;     Ultrasound:          mins  37223;    Electrical Stimulation:         mins  67007 ( );  Iontophoresis         mins 99474    Untimed:  Electrical Stimulation:         mins  63135 ( );  Traction:         mins  05971;   Dry Needling   (1-2  muscles)       mins 20560 (Self-pay)  Dry Needling (3-4 muscles)        mins 20561 (Self-pay)  Dry Needling Trial          mins DRYNDLTRIAL  (No Charge)    Timed Treatment:   30   mins   Total Treatment:     45   mins    PT SIGNATURE: KELSIE Viveros PT License Number: [id number]    Electronically signed by Gina Mackey PT, 04/27/23, 10:13 AM EDT]    DATE TREATMENT INITIATED: 4/27/2023    90 Day Recertification  Certification Period: 4/30/23-7/23/23   I certify that the therapy services are furnished while this patient is under my care.  The services outlined above are required by this patient, and will be reviewed every 90 days.     PHYSICIAN: Shalom Montaño MD   NPI: [id number]                                         DATE:

## 2023-05-04 ENCOUNTER — TREATMENT (OUTPATIENT)
Dept: PHYSICAL THERAPY | Facility: CLINIC | Age: 73
End: 2023-05-04
Payer: MEDICARE

## 2023-05-04 DIAGNOSIS — M25.619 LIMITED RANGE OF MOTION (ROM) OF SHOULDER: ICD-10-CM

## 2023-05-04 DIAGNOSIS — G89.29 CHRONIC RIGHT SHOULDER PAIN: Primary | ICD-10-CM

## 2023-05-04 DIAGNOSIS — M25.511 CHRONIC RIGHT SHOULDER PAIN: Primary | ICD-10-CM

## 2023-05-04 DIAGNOSIS — M62.89 MUSCLE TIGHTNESS: ICD-10-CM

## 2023-05-04 DIAGNOSIS — R29.3 POOR POSTURE: ICD-10-CM

## 2023-05-04 PROCEDURE — 97110 THERAPEUTIC EXERCISES: CPT | Performed by: PHYSICAL THERAPIST

## 2023-05-04 PROCEDURE — 97140 MANUAL THERAPY 1/> REGIONS: CPT | Performed by: PHYSICAL THERAPIST

## 2023-05-04 NOTE — PROGRESS NOTES
Physical Therapy Daily Treatment Note      Patient: Mason Angel   : 1950  Referring practitioner: Shalom Montaño MD  Date of Initial Visit: Type: THERAPY  Noted: 2023  Today's Date: 2023  Patient seen for 10 sessions         Mason Angel reports: his shoulder pain 2/10 after shot ~2 weeks ago. Dr. Montaño has released me.          Objective   Passive Range of Motion   WFL and no pain in all planes    Active Range of Motion     Right Shoulder   Flexion: 165 degrees   Abduction: 180 degrees   External rotation 90°: 85 degrees  Internal rotation 90°: 88 degrees      Strength/Myotome Testing     Right Shoulder      Planes of Motion   Flexion: 4   Abduction: 4   External rotation at 0°: 4+  Internal rotation at 0°: 5      Isolated Muscles   Biceps: 5   Supraspinatus: 4   Triceps: 5      See Exercise, Manual, and Modality Logs for complete treatment.       Assessment/Plan  Pt with sig improved pain with recent injection; no c/o of pain with exercises just some tightness and muscle soreness by end of session. Pt instructed to apply ice at home if needed. Pt with minimal cues for exercises today and plan to transition to [facility]; discharge in 2 sessions.      Progress per Plan of Care and Progress strengthening /stabilization /functional activity       Timed:  Manual Therapy:    8     mins  65940;  Therapeutic Exercise:    20     mins  32344;     Neuromuscular Dewey:    -    mins  11311;    Therapeutic Activity:     10     mins  49295;   (decreased units billed to account for divided time)  Gait Training:      -     mins  89269;     Ultrasound:     -     mins  51047;      Untimed:  Electrical Stimulation:    -     mins  15190 ( );  Mechanical Traction:    -     mins  43330;   Dry needling:      -     mins  66093/    Timed Treatment:   38   mins   Total Treatment:     45   mins    Rosalinda Cline PT  Physical Therapist    License #: [id number]

## 2023-05-08 ENCOUNTER — TREATMENT (OUTPATIENT)
Dept: PHYSICAL THERAPY | Facility: CLINIC | Age: 73
End: 2023-05-08
Payer: MEDICARE

## 2023-05-08 DIAGNOSIS — M25.511 CHRONIC RIGHT SHOULDER PAIN: Primary | ICD-10-CM

## 2023-05-08 DIAGNOSIS — G89.29 CHRONIC RIGHT SHOULDER PAIN: Primary | ICD-10-CM

## 2023-05-08 DIAGNOSIS — M75.21 BICEPS TENDINITIS OF RIGHT UPPER EXTREMITY: ICD-10-CM

## 2023-05-08 PROCEDURE — 97110 THERAPEUTIC EXERCISES: CPT | Performed by: PHYSICAL THERAPIST

## 2023-05-08 PROCEDURE — 97530 THERAPEUTIC ACTIVITIES: CPT | Performed by: PHYSICAL THERAPIST

## 2023-05-08 NOTE — PROGRESS NOTES
[facility] [place]  [address] [facility] [place] [address]  Phone [phone]  Fax [phone]      Physical Therapy Daily Treatment Note      Patient: Mason Angel   : 1950  Referring practitioner: Shalom Montaño MD  Date of Initial Visit: Type: THERAPY  Noted: 2023  Today's Date: 2023  Patient seen for 11 sessions       Visit Diagnoses:    ICD-10-CM ICD-9-CM   1. Chronic right shoulder pain  M25.511 719.41    G89.29 338.29       Mason Angel reports: I used the weedeater this weekend and my shoulder blade is really sore.  Woke up the next day with pain.    Subjective     Objective   See Exercise, Manual, and Modality Logs for complete treatment.   Increased tenderness R rhomboid, upper trap and levator. None significant at R shoulder, SA space, rtc.    Assessment/Plan  Patient presents with R scapular tenderness, tightness, and pain following a first time weed eating this weekend.  Able to complete exercises but we did decrease resistance/reps with ER and abduction.    Assess exercises and return to full as able.  Advised patient to ice and minimize activity.  Timed:         Manual Therapy:    8     mins  50361;     Therapeutic Exercise:    15     mins  04446;     Neuromuscular Dewey:         mins  08366;    Therapeutic Activity:     10    mins  89275;     Gait Training:      -     mins  03802;     Ultrasound:     -     mins  56009;    Ionto                               -    mins   80598  Self Care                       -     mins   41667  Orthotic training    -     mins 07710      Un-Timed:  Electrical Stimulation:    -     mins  74977 ( );  Dry Needling     -     mins self-pay  Traction     -     mins 89397      Timed Treatment:   33   mins   Total Treatment:     40   mins    Raysa Swann, PT, OCS, CIDN  [place] License: [id number]

## 2023-05-11 ENCOUNTER — TREATMENT (OUTPATIENT)
Dept: PHYSICAL THERAPY | Facility: CLINIC | Age: 73
End: 2023-05-11
Payer: MEDICARE

## 2023-05-11 DIAGNOSIS — M25.619 LIMITED RANGE OF MOTION (ROM) OF SHOULDER: ICD-10-CM

## 2023-05-11 DIAGNOSIS — R29.3 POOR POSTURE: ICD-10-CM

## 2023-05-11 DIAGNOSIS — M62.89 MUSCLE TIGHTNESS: ICD-10-CM

## 2023-05-11 DIAGNOSIS — M75.21 BICEPS TENDINITIS OF RIGHT UPPER EXTREMITY: ICD-10-CM

## 2023-05-11 DIAGNOSIS — G89.29 CHRONIC RIGHT SHOULDER PAIN: Primary | ICD-10-CM

## 2023-05-11 DIAGNOSIS — M25.511 CHRONIC RIGHT SHOULDER PAIN: Primary | ICD-10-CM

## 2023-05-11 PROCEDURE — 97140 MANUAL THERAPY 1/> REGIONS: CPT | Performed by: PHYSICAL THERAPIST

## 2023-05-11 PROCEDURE — 97110 THERAPEUTIC EXERCISES: CPT | Performed by: PHYSICAL THERAPIST

## 2023-05-11 NOTE — PROGRESS NOTES
Physical Therapy Daily Treatment Note      Patient: Mason Angel   : 1950  Referring practitioner: Shalom Montaño MD  Date of Initial Visit: Type: THERAPY  Noted: 2023  Today's Date: 2023  Patient seen for 12 sessions         Mason Angel reports: pain is better today; reports he won't weedeat that long in the future due to increased pain. Pain 2-3/10 today in R shoulder.     Objective   See Exercise, Manual, and Modality Logs for complete treatment.       Assessment/Plan  Pt with controlled pain since steroid injection, recent flare up with working in the yard last weekend but pain has mostly subsided. Hypertonic band at middle trap/rhomboids on R shoulder; improved from previous sessions and decreased tension/pain with STM. R shoulder PROM and AROM WNL with tolerable pain. Plan to discharge to HEP next session.        Progress per Plan of Care and Progress strengthening /stabilization /functional activity         Timed:  Manual Therapy:    12     mins  16769;  Therapeutic Exercise:    30     mins  51260;     Neuromuscular Dewey:    -    mins  88199;    Therapeutic Activity:     -     mins  43916;     Gait Training:      -     mins  50130;     Ultrasound:     -     mins  73347;      Untimed:  Electrical Stimulation:    -     mins  89111 ( );  Mechanical Traction:    -     mins  39826;   Dry needling:      -     mins  19585/    Timed Treatment:   42   mins   Total Treatment:     55   mins  Rosalinda Cline PT  Physical Therapist    License #: [id number]

## 2023-05-16 ENCOUNTER — TREATMENT (OUTPATIENT)
Dept: PHYSICAL THERAPY | Facility: CLINIC | Age: 73
End: 2023-05-16
Payer: MEDICARE

## 2023-05-16 DIAGNOSIS — R29.3 POOR POSTURE: ICD-10-CM

## 2023-05-16 DIAGNOSIS — M25.511 CHRONIC RIGHT SHOULDER PAIN: Primary | ICD-10-CM

## 2023-05-16 DIAGNOSIS — M62.89 MUSCLE TIGHTNESS: ICD-10-CM

## 2023-05-16 DIAGNOSIS — M75.21 BICEPS TENDINITIS OF RIGHT UPPER EXTREMITY: ICD-10-CM

## 2023-05-16 DIAGNOSIS — M25.619 LIMITED RANGE OF MOTION (ROM) OF SHOULDER: ICD-10-CM

## 2023-05-16 DIAGNOSIS — G89.29 CHRONIC RIGHT SHOULDER PAIN: Primary | ICD-10-CM

## 2023-05-16 PROCEDURE — 97110 THERAPEUTIC EXERCISES: CPT | Performed by: PHYSICAL THERAPIST

## 2023-05-16 PROCEDURE — 97140 MANUAL THERAPY 1/> REGIONS: CPT | Performed by: PHYSICAL THERAPIST

## 2023-05-16 NOTE — PROGRESS NOTES
Physical Therapy Daily Treatment Note + Discharge Summary      Patient: Mason Angel   : 1950  Referring practitioner: Shalom Montaño MD  Date of Initial Visit: Type: THERAPY  Noted: 2023  Today's Date: 2023  Patient seen for 13 sessions         Mason Angel reports: he is doing well, minimal pain, some tightness in the shoulder blade    QuickDASH: 29.55    Objective     Palpation      Right Tenderness of the levator scapulae, middle trapezius, rhomboids and upper trapezius.     Additional Palpation Details  Pt with minimal tenderness     Active Range of Motion      Cervical   Lateral flexion to the R: 35 degrees  Lateral flexion to the L: 35 degrees  Rotation to the R: 75 degrees  Rotation to the L: 75 degrees    Right Shoulder   Flexion: 160 degrees   Abduction: 180 degrees   External rotation 90°: 75 degrees  Internal rotation 90°: 88 degrees     Additional Active Range of Motion Details  Right standing AROM shoulder flex and  degrees    Right standing AROM shoulder ER 0 degrees: 68 degrees      Strength/Myotome Testing     Right Shoulder      Planes of Motion   Flexion: 4+  mild pain  Abduction: 4   External rotation at 0°: 4+   Internal rotation at 0°: 5      Isolated Muscles   Biceps: 5   Supraspinatus: 4+   Triceps: 5       Isolated Muscles   Lower trapezius: 3-   Middle trapezius: 4  Rhomboids: 4     See Exercise, Manual, and Modality Logs for complete treatment.       Assessment & Plan     Assessment    Assessment details: Mason Angel is a 72 y.o. year-old male referred to physical therapy for chronic R shoulder pain. He has comorbidities CVA, R RTC repair (22). Pt has made good progress with R shoulder ROM and strength and improved pain. Pt recently had 2nd steroid injection ~ 2 weeks ago with sig reduction in R shoulder pain. Pt has attended 13 PT sessions. Pt has improved active right shoulder ROM and PROM WNL. Pt still has some shoulder, rotator cuff and  scapular weakness and continues to tolerate all strengthening exercises. Pt is appropriate for transition to HEP to continue to strengthening.     Goals  Plan Goals: STG: ~6 weeks  1. Pt will demonstrate R shoulder PROM in flexion and IR WNL and tolerable pain (2/10 or less). MET   2. Pt will increase cervical lateral flexion to 35 degrees to improve sleeping on his side.-MET  3. Pt will increase cervical rotation to 75 degrees to improve ease of driving-MET  4. Decrease right shoulder pain to a 3/10 or less with reaching and lifting items from overhead cabinet-MET  5. Pt will be able to return to fishing without R shoulder limitation/pain-PROGRESSING    LTG: ~12 weeks  1. Pt will be able to sleep through the night without getting up due to shoulder pain.-MET  2. Pt improve R cervical/thoracic tenderness to palpation from severe/moderate to minimal at UT/LS and along posterior shoulder-MET  3. Pt will improve R shoulder strength to 5/5 in flexion and ABD to improve ease with lifting-PROGRESSING  4. Pt will improve R scapular strength to 4/5 at middle and lower trap to improve strength with WB through his R arm-PROGRESSING  5. Pt will improve QuickDASH score to 20 or less to improve subjective function of shoulder with ADLs-PROGRESSING      Discharge to [facility]         Timed:  Manual Therapy:    12     mins  21493;  Therapeutic Exercise:    30     mins  47662;     Neuromuscular Dewey:    -    mins  15165;    Therapeutic Activity:     5     mins  56821;     Gait Training:      -     mins  56338;     Ultrasound:     -     mins  63076;      Untimed:  Electrical Stimulation:    -     mins  71606 ( );  Mechanical Traction:    -     mins  40229;   Dry needling:      -     mins  20560/20561    Timed Treatment:   47   mins   Total Treatment:     60   mins    Rosalinda Cline PT  Physical Therapist    License #: [id number]

## 2023-07-25 ENCOUNTER — TELEPHONE (OUTPATIENT)
Dept: ORTHOPEDIC SURGERY | Facility: CLINIC | Age: 73
End: 2023-07-25

## 2023-07-25 NOTE — TELEPHONE ENCOUNTER
Caller: Mason Angel    Relationship to patient: Self    Best call back number:     Chief complaint: RT SHOULDER     Type of visit: FUP INJECTION

## 2023-07-28 NOTE — TELEPHONE ENCOUNTER
Caller: Mason Angel    Relationship to patient: Self    Best call back number: [phone]    Patient is needing: CALLING TO CHECK STATUS OF SCHEDULING RIGHT SHOULDER INJECTION

## 2023-08-24 ENCOUNTER — OFFICE VISIT (OUTPATIENT)
Dept: ORTHOPEDIC SURGERY | Facility: CLINIC | Age: 73
End: 2023-08-24
Payer: MEDICARE

## 2023-08-24 VITALS — HEIGHT: 67 IN | WEIGHT: 152 LBS | BODY MASS INDEX: 23.86 KG/M2

## 2023-08-24 DIAGNOSIS — M79.2 NEUROPATHIC PAIN OF RIGHT SHOULDER: ICD-10-CM

## 2023-08-24 DIAGNOSIS — Z98.890 STATUS POST ARTHROSCOPY OF SHOULDER: ICD-10-CM

## 2023-08-24 DIAGNOSIS — M19.011 PRIMARY OSTEOARTHRITIS OF RIGHT SHOULDER: Primary | ICD-10-CM

## 2023-08-24 RX ORDER — ZOLPIDEM TARTRATE 5 MG/1
5 TABLET ORAL
COMMUNITY
Start: 2023-08-18

## 2023-08-24 RX ORDER — TAMSULOSIN HYDROCHLORIDE 0.4 MG/1
0.8 CAPSULE ORAL DAILY
Qty: 60 CAPSULE | Refills: 11 | COMMUNITY
Start: 2023-08-18 | End: 2024-08-17

## 2023-08-24 RX ADMIN — LIDOCAINE HYDROCHLORIDE 4 ML: 10 INJECTION, SOLUTION EPIDURAL; INFILTRATION; INTRACAUDAL; PERINEURAL at 10:16

## 2023-08-24 RX ADMIN — TRIAMCINOLONE ACETONIDE 80 MG: 40 INJECTION, SUSPENSION INTRA-ARTICULAR; INTRAMUSCULAR at 10:16

## 2023-08-24 NOTE — PROGRESS NOTES
Subjective:     Patient ID: Mason Angel is a 72 y.o. male.    Chief Complaint:  F/u s/p right shoulder rotator cuff revision repair and distal clavicle excision  DOS 9/9/2022  Last visit-right shoulder glenohumeral injection-4/20/2023    History of Present Illness  Mason Angel returns to clinic today for evaluation of right shoulder pain.    The patient's last injection worked for about 6 weeks. Since then, his pain has been significantly increasing in intensity, localized primarily to the posterior glenohumeral joint line, extending over into the posterior medial scapula. He rates it as moderate to severe in intensity, aching in nature. It does mildly wax and wane. It is worse when he raises his arm overhead or does repetitive lifting, pushing, or pulling activities. He denies any fevers, chills, sweats, or any radiation of the pain. Minimal improvement with activity modification, anti-inflammatory medications, and rest.     Social History     Occupational History    Not on file   Tobacco Use    Smoking status: Never     Passive exposure: Never    Smokeless tobacco: Never   Vaping Use    Vaping Use: Never used   Substance and Sexual Activity    Alcohol use: Yes     Alcohol/week: 2.0 standard drinks     Types: 2 Cans of beer per week     Comment: daily    Drug use: Never    Sexual activity: Defer      Past Medical History:   Diagnosis Date    GERD (gastroesophageal reflux disease)     Hyperlipidemia     Stroke 03/2014    affected left side, limited strength left arm     Past Surgical History:   Procedure Laterality Date    APPENDECTOMY      BICEPS TENDONESIS SUBPECTORALIS REPAIR Right 06/25/2021    Procedure: BICEPS TENDONESIS SUBPECTORALIS MINI OPEN REPAIR;  Surgeon: Shalom Montaño MD;  Location: [facility];  Service: Orthopedics;  Laterality: Right;    FOOT SURGERY Left     x2 crush injury w/nerve damage    LAPAROSCOPIC CHOLECYSTECTOMY      NASAL SEPTUM SURGERY      SHOULDER ARTHROSCOPY W/ ROTATOR  "CUFF REPAIR Right 06/25/2021    Procedure: SHOULDER ARTHROSCOPY WITH ROTATOR CUFF REPAIR;  Surgeon: Shalom Montaño MD;  Location:  [facility];  Service: Orthopedics;  Laterality: Right;  RIGHT SHOULDER ARTHROSCOPY WITH ROTATOR CUFF REPAIR    SHOULDER ARTHROSCOPY W/ ROTATOR CUFF REPAIR Right 9/9/2022    Procedure: SHOULDER ARTHROSCOPY WITH ROTATOR CUFF,  distal clavicle excision;  Surgeon: Shalom Montaño MD;  Location:  [facility];  Service: Orthopedics;  Laterality: Right;       Family History   Problem Relation Age of Onset    Ovarian cancer Mother     COPD Father     Malig Hyperthermia Neg Hx          Review of Systems        Objective:  Vitals:    08/24/23 0855   Weight: 68.9 kg (152 lb)   Height: 170.2 cm (67\")         08/24/23  0855   Weight: 68.9 kg (152 lb)     Body mass index is 23.81 kg/m².  Physical Exam    Vital signs reviewed.   General: No acute distress, alert and oriented  Eyes: conjunctiva clear; pupils equally round and reactive  ENT: external ears and nose atraumatic; oropharynx clear  CV: no peripheral edema  Resp: normal respiratory effort  Skin: no rashes or wounds; normal turgor  Psych: mood and affect appropriate; recent and remote memory intact        Ortho Exam        Right shoulder-  Incision is well healed  Positive maximal tenderness is along anterior posterior glenohumeral joint line with mild crepitus noted  Active Forward Flexion- 165 degrees  Strength- 4+/5  Active External Rotation- 45 degrees  Strength- 4+/5  Positive deltoid in all 3 components      Imaging:  None today  Assessment:        1. Primary osteoarthritis of right shoulder    2. Status post arthroscopy of shoulder    3. Neuropathic pain of right shoulder           Plan:  Large Joint Arthrocentesis: R glenohumeral  Date/Time: 8/24/2023 10:16 AM  Consent given by: patient  Site marked: site marked  Timeout: Immediately prior to procedure a time out was called to verify the correct patient, procedure, equipment, " support staff and site/side marked as required   Supporting Documentation  Indications: pain   Procedure Details  Location: shoulder - R glenohumeral  Preparation: Patient was prepped and draped in the usual sterile fashion  Needle size: 22 G  Approach: anterolateral  Medications administered: 80 mg triamcinolone acetonide 40 MG/ML; 4 mL lidocaine PF 1% 1 %  Patient tolerance: patient tolerated the procedure well with no immediate complications            1. I discussed treatment options at length with the patient at today's visit. At this point in time, he would like to proceed with a glenohumeral injection today. We did discuss options for right shoulder arthroplasty, but he wants to hold off on any surgical treatment at this point.    2. He is to continue with shoulder range of motion, strength and activities as tolerated.    3. Patient would like to proceed with cortisone injection today to the right shoulder glenohumeral joint. Recommended limited use of affected extremity for the next 24 hours to only essential activites other than work on general active and passive motion. Recommended supplementing with ice and soft tissue massage. Discussed with patient that they should see results in 5-7 days, if no improvement in 5-6 weeks I have asked them to call the office to review other options. Patient should call office immediately if they notice redness, warmth, fevers, chills, or residual numbness or tingling for greater than 6 hours after injection.       4. I will follow up with the patient in 3 months or sooner if needed. If he gets minimal or limited term improvement with his injection today, we may consider ultrasound guided injection.      Mason Angel was in agreement with plan and had all questions answered.     Orders:  Orders Placed This Encounter   Procedures    Large Joint Arthrocentesis: R glenohumeral       Medications:  No orders of the defined types were placed in this  encounter.      Followup:  Return in about 3 months (around 11/24/2023).    Diagnoses and all orders for this visit:    1. Primary osteoarthritis of right shoulder (Primary)    2. Status post arthroscopy of shoulder    3. Neuropathic pain of right shoulder    Other orders  -     Large Joint Arthrocentesis: R glenohumeral          Dictated utilizing Dragon dictation     Transcribed from ambient dictation for Shalom Montaño MD by Kalyn Alarcon.  08/24/23   11:29 EDT    Patient or patient representative verbalized consent to the visit recording.  I have personally performed the services described in this document as transcribed by the above individual, and it is both accurate and complete.

## 2023-09-11 RX ORDER — TRIAMCINOLONE ACETONIDE 40 MG/ML
80 INJECTION, SUSPENSION INTRA-ARTICULAR; INTRAMUSCULAR
Status: COMPLETED | OUTPATIENT
Start: 2023-08-24 | End: 2023-08-24

## 2023-09-11 RX ORDER — LIDOCAINE HYDROCHLORIDE 10 MG/ML
4 INJECTION, SOLUTION EPIDURAL; INFILTRATION; INTRACAUDAL; PERINEURAL
Status: COMPLETED | OUTPATIENT
Start: 2023-08-24 | End: 2023-08-24

## 2023-11-29 ENCOUNTER — OFFICE VISIT (OUTPATIENT)
Dept: ORTHOPEDIC SURGERY | Facility: CLINIC | Age: 73
End: 2023-11-29
Payer: MEDICARE

## 2023-11-29 VITALS — WEIGHT: 152 LBS | BODY MASS INDEX: 23.86 KG/M2 | HEIGHT: 67 IN

## 2023-11-29 DIAGNOSIS — M79.2 NEUROPATHIC PAIN OF RIGHT SHOULDER: ICD-10-CM

## 2023-11-29 DIAGNOSIS — Z98.890 STATUS POST ARTHROSCOPY OF SHOULDER: ICD-10-CM

## 2023-11-29 DIAGNOSIS — M19.011 PRIMARY OSTEOARTHRITIS OF RIGHT SHOULDER: Primary | ICD-10-CM

## 2023-11-29 RX ORDER — FINASTERIDE 5 MG/1
5 TABLET, FILM COATED ORAL DAILY
COMMUNITY
Start: 2023-09-29 | End: 2023-12-28

## 2023-11-29 RX ADMIN — LIDOCAINE HYDROCHLORIDE 4 ML: 10 INJECTION, SOLUTION EPIDURAL; INFILTRATION; INTRACAUDAL; PERINEURAL at 12:38

## 2023-11-29 RX ADMIN — TRIAMCINOLONE ACETONIDE 80 MG: 40 INJECTION, SUSPENSION INTRA-ARTICULAR; INTRAMUSCULAR at 12:38

## 2023-11-29 NOTE — PROGRESS NOTES
Subjective:     Patient ID: Mason Angel is a 72 y.o. male.    Chief Complaint:  F/u s/p right shoulder rotator cuff revision repair and distal clavicle excision  DOS 9/9/2022  Last visit-right shoulder glenohumeral injection-8/24/2023    History of Present Illness  Mason Angel returns to clinic today for evaluation of right shoulder pain. His last injection did help for about 6 weeks or so, but over the last 4 to 6 weeks he has had increasing pain, particularly over the lateral and posterior aspect of his right shoulder. He rates it as moderate intensity, aching in nature. He denies any numbness or tingling. He denies any fevers, chills, or sweats. His pain is worse with overhead positioning and active abduction. He does note some mild radiation down the lateral aspect of his arm. He denies any numbness or tingling. There is minimal improvement with rest and activity modification as well as soft tissue massage.     Social History     Occupational History    Not on file   Tobacco Use    Smoking status: Never     Passive exposure: Never    Smokeless tobacco: Never   Vaping Use    Vaping Use: Never used   Substance and Sexual Activity    Alcohol use: Yes     Alcohol/week: 2.0 standard drinks of alcohol     Types: 2 Cans of beer per week     Comment: daily    Drug use: Never    Sexual activity: Defer      Past Medical History:   Diagnosis Date    GERD (gastroesophageal reflux disease)     Hyperlipidemia     Stroke 03/2014    affected left side, limited strength left arm     Past Surgical History:   Procedure Laterality Date    APPENDECTOMY      BICEPS TENDONESIS SUBPECTORALIS REPAIR Right 06/25/2021    Procedure: BICEPS TENDONESIS SUBPECTORALIS MINI OPEN REPAIR;  Surgeon: Shalom Montaño MD;  Location: [facility];  Service: Orthopedics;  Laterality: Right;    FOOT SURGERY Left     x2 crush injury w/nerve damage    LAPAROSCOPIC CHOLECYSTECTOMY      NASAL SEPTUM SURGERY      SHOULDER ARTHROSCOPY W/ ROTATOR  "CUFF REPAIR Right 06/25/2021    Procedure: SHOULDER ARTHROSCOPY WITH ROTATOR CUFF REPAIR;  Surgeon: Shalom Montaño MD;  Location:  [facility];  Service: Orthopedics;  Laterality: Right;  RIGHT SHOULDER ARTHROSCOPY WITH ROTATOR CUFF REPAIR    SHOULDER ARTHROSCOPY W/ ROTATOR CUFF REPAIR Right 9/9/2022    Procedure: SHOULDER ARTHROSCOPY WITH ROTATOR CUFF,  distal clavicle excision;  Surgeon: Shalom Montaño MD;  Location:  [facility];  Service: Orthopedics;  Laterality: Right;       Family History   Problem Relation Age of Onset    Ovarian cancer Mother     COPD Father     Malig Hyperthermia Neg Hx          Review of Systems        Objective:  Vitals:    11/29/23 1137   Weight: 68.9 kg (152 lb)   Height: 170.2 cm (67\")         11/29/23  1137   Weight: 68.9 kg (152 lb)     Body mass index is 23.81 kg/m².  Physical Exam    Vital signs reviewed.   General: No acute distress, alert and oriented  Eyes: conjunctiva clear; pupils equally round and reactive  ENT: external ears and nose atraumatic; oropharynx clear  CV: no peripheral edema  Resp: normal respiratory effort  Skin: no rashes or wounds; normal turgor  Psych: mood and affect appropriate; recent and remote memory intact        Ortho Exam       Right shoulder-  Incision is well healed  Positive maximal tenderness is along anterior posterior glenohumeral joint line with mild crepitus noted  Active Forward Flexion- 165 degrees  Strength- 4+/5  Active External Rotation- 45 degrees  Strength- 4+/5  Positive deltoid in all 3 components    Imaging:  None today  Assessment:        1. Primary osteoarthritis of right shoulder    2. Status post arthroscopy of shoulder    3. Neuropathic pain of right shoulder           Plan:    - Large Joint Arthrocentesis: R subacromial bursa on 11/29/2023 12:38 PM  Indications: pain  Details: 22 G needle, lateral approach  Medications: 80 mg triamcinolone acetonide 40 MG/ML; 4 mL lidocaine PF 1% 1 %  Outcome: tolerated well, no immediate " complications  Procedure, treatment alternatives, risks and benefits explained, specific risks discussed. Consent was given by the patient. Immediately prior to procedure a time out was called to verify the correct patient, procedure, equipment, support staff and site/side marked as required. Patient was prepped and draped in the usual sterile fashion.                 I discussed treatment options at length with patient at today's visit. We did review again option for surgical treatment with arthroplasty, but he wants to hold off on that at this time. He would like to proceed with another injection today, but given his lateral sided pain, we will try going from a subacromial approach to see if this gets better response for his acute pain now.  2. Patient would like to proceed with cortisone injection today to the right shoulder subacromial space. Recommended limited use of affected extremity for the next 24 hours to only essential activites other than work on general active and passive motion. Recommended supplementing with ice and soft tissue massage. Discussed with patient that they should see results in 5-7 days, if no improvement in 5-6 weeks I have asked them to call the office to review other options. Patient should call office immediately if they notice redness, warmth, fevers, chills, or residual numbness or tingling for greater than 6 hours after injection.   3. We will consider ultrasound guided injection of the glenohumeral joint if this is unsuccessful.  4. I will follow up with him in 3 months or sooner if needed.      Mason Alango was in agreement with plan and had all questions answered.     Orders:  Orders Placed This Encounter   Procedures    - Large Joint Arthrocentesis: R subacromial bursa       Medications:  No orders of the defined types were placed in this encounter.      Followup:  No follow-ups on file.    Diagnoses and all orders for this visit:    1. Primary osteoarthritis of right  shoulder (Primary)    2. Status post arthroscopy of shoulder    3. Neuropathic pain of right shoulder    Other orders  -     - Large Joint Arthrocentesis: R subacromial bursa          Dictated utilizing Dragon dictation     Transcribed from ambient dictation for Shalom Montaño MD by Edyta Rockwell.  11/29/23   16:14 EST    Patient or patient representative verbalized consent to the visit recording.  I have personally performed the services described in this document as transcribed by the above individual, and it is both accurate and complete.

## 2023-12-17 RX ORDER — TRIAMCINOLONE ACETONIDE 40 MG/ML
80 INJECTION, SUSPENSION INTRA-ARTICULAR; INTRAMUSCULAR
Status: COMPLETED | OUTPATIENT
Start: 2023-11-29 | End: 2023-11-29

## 2023-12-17 RX ORDER — LIDOCAINE HYDROCHLORIDE 10 MG/ML
4 INJECTION, SOLUTION EPIDURAL; INFILTRATION; INTRACAUDAL; PERINEURAL
Status: COMPLETED | OUTPATIENT
Start: 2023-11-29 | End: 2023-11-29

## 2024-04-22 ENCOUNTER — OFFICE VISIT (OUTPATIENT)
Dept: ORTHOPEDIC SURGERY | Facility: CLINIC | Age: 74
End: 2024-04-22
Payer: MEDICARE

## 2024-04-22 VITALS — BODY MASS INDEX: 23.86 KG/M2 | HEIGHT: 67 IN | WEIGHT: 152 LBS

## 2024-04-22 DIAGNOSIS — M19.011 PRIMARY OSTEOARTHRITIS OF RIGHT SHOULDER: Primary | ICD-10-CM

## 2024-04-22 DIAGNOSIS — Z98.890 STATUS POST ARTHROSCOPY OF SHOULDER: ICD-10-CM

## 2024-04-22 DIAGNOSIS — M75.51 BURSITIS OF RIGHT SHOULDER: ICD-10-CM

## 2024-04-22 RX ORDER — ACYCLOVIR 400 MG/1
TABLET ORAL
COMMUNITY
Start: 2024-03-27

## 2024-04-22 RX ADMIN — TRIAMCINOLONE ACETONIDE 80 MG: 40 INJECTION, SUSPENSION INTRA-ARTICULAR; INTRAMUSCULAR at 15:05

## 2024-04-22 RX ADMIN — LIDOCAINE HYDROCHLORIDE 52 ML: 10 INJECTION, SOLUTION EPIDURAL; INFILTRATION; INTRACAUDAL; PERINEURAL at 15:05

## 2024-04-22 NOTE — PROGRESS NOTES
Subjective:     Patient ID: Mason Angel is a 73 y.o. male.    Chief Complaint:  F/u s/p right shoulder rotator cuff revision repair and distal clavicle excision  DOS 9/9/2022  Last visit-right shoulder glenohumeral injection-11/29/2023  History of Present Illness  Mason Angel returns to clinic today for evaluation of right shoulder.    He received an injection nearly 5 months ago, which provided significant relief for approximately 4.5 months. However, over the past 2 to 3 weeks, he has observed escalating pain levels, particularly over the lateral aspect of his right shoulder, which is exacerbated at night and during any lifting, pushing, or pulling activities. The pain is described as achy and moderate in intensity. He denies any associated numbness or tingling, fevers, chills, or sweats. There have been no recent injuries.     Social History     Occupational History    Not on file   Tobacco Use    Smoking status: Never     Passive exposure: Never    Smokeless tobacco: Never   Vaping Use    Vaping status: Never Used   Substance and Sexual Activity    Alcohol use: Yes     Alcohol/week: 2.0 standard drinks of alcohol     Types: 2 Cans of beer per week     Comment: daily    Drug use: Never    Sexual activity: Defer      Past Medical History:   Diagnosis Date    GERD (gastroesophageal reflux disease)     Hyperlipidemia     Stroke 03/2014    affected left side, limited strength left arm     Past Surgical History:   Procedure Laterality Date    APPENDECTOMY      BICEPS TENDONESIS SUBPECTORALIS REPAIR Right 06/25/2021    Procedure: BICEPS TENDONESIS SUBPECTORALIS MINI OPEN REPAIR;  Surgeon: Shalom Montaño MD;  Location: [facility];  Service: Orthopedics;  Laterality: Right;    FOOT SURGERY Left     x2 crush injury w/nerve damage    LAPAROSCOPIC CHOLECYSTECTOMY      NASAL SEPTUM SURGERY      SHOULDER ARTHROSCOPY W/ ROTATOR CUFF REPAIR Right 06/25/2021    Procedure: SHOULDER ARTHROSCOPY WITH ROTATOR CUFF  "REPAIR;  Surgeon: Shalom Montaño MD;  Location:  [facility];  Service: Orthopedics;  Laterality: Right;  RIGHT SHOULDER ARTHROSCOPY WITH ROTATOR CUFF REPAIR    SHOULDER ARTHROSCOPY W/ ROTATOR CUFF REPAIR Right 9/9/2022    Procedure: SHOULDER ARTHROSCOPY WITH ROTATOR CUFF,  distal clavicle excision;  Surgeon: Shalom Montaño MD;  Location:  [facility];  Service: Orthopedics;  Laterality: Right;       Family History   Problem Relation Age of Onset    Ovarian cancer Mother     COPD Father     Malig Hyperthermia Neg Hx          Review of Systems        Objective:  Vitals:    04/22/24 1458   Weight: 68.9 kg (152 lb)   Height: 170.2 cm (67\")         04/22/24  1458   Weight: 68.9 kg (152 lb)     Body mass index is 23.81 kg/m².  General: No acute distress.  Resp: normal respiratory effort  Skin: no rashes or wounds; normal turgor  Psych: mood and affect appropriate; recent and remote memory intact        Ortho Exam     Right shoulder-  Incision is well healed  Positive maximal tenderness is along anterior posterior glenohumeral joint line with mild crepitus noted  Active Forward Flexion- 165 degrees  Strength- 4+/5  Active External Rotation- 45 degrees  Strength- 4+/5  Positive deltoid in all 3 components    Imaging:  None today  Assessment:        1. Primary osteoarthritis of right shoulder    2. Status post arthroscopy of shoulder    3. Bursitis of right shoulder           Plan:  1. At this point in time, we would like to proceed with repeat injection today. We have discussed again option for shoulder arthroplasty, but he states his pain is not that bad and is well controlled with the injection, so he would like to stick with the more conservative treatment at this point.   2. Patient would like to proceed with cortisone injection today to the right shoulder subacromial space. Recommended limited use of affected extremity for the next 24 hours to only essential activites other than work on general active and passive " motion. Recommended supplementing with ice and soft tissue massage. Discussed with patient that they should see results in 5-7 days, if no improvement in 5-6 weeks I have asked them to call the office to review other options. Patient should call office immediately if they notice redness, warmth, fevers, chills, or residual numbness or tingling for greater than 6 hours after injection.   3. Continue with home exercise, work on shoulder range of motion and strength as tolerated.   4. Follow up in 4 months or sooner if needed. If he fails to get improvement with subacromial injection, we can switch to glenohumeral before the 3-month isadora. He was in agreement. Had all questions answered.    Large Joint Arthrocentesis: R subacromial bursa  Date/Time: 4/22/2024 3:05 PM  Consent given by: patient  Site marked: site marked  Timeout: Immediately prior to procedure a time out was called to verify the correct patient, procedure, equipment, support staff and site/side marked as required   Supporting Documentation  Indications: pain   Procedure Details  Location: shoulder - R subacromial bursa  Preparation: Patient was prepped and draped in the usual sterile fashion  Needle size: 22 G  Approach: lateral  Medications administered: 80 mg triamcinolone acetonide 40 MG/ML; 52 mL lidocaine PF 1% 1 %  Patient tolerance: patient tolerated the procedure well with no immediate complications                    Mason Angel was in agreement with plan and had all questions answered.     Orders:  Orders Placed This Encounter   Procedures    Large Joint Arthrocentesis: R subacromial bursa       Medications:  No orders of the defined types were placed in this encounter.      Followup:  No follow-ups on file.    Diagnoses and all orders for this visit:    1. Primary osteoarthritis of right shoulder (Primary)  -     Large Joint Arthrocentesis: R subacromial bursa    2. Status post arthroscopy of shoulder    3. Bursitis of right shoulder  -      Large Joint Arthrocentesis: R subacromial bursa          Dictated utilizing Dragon dictation     Transcribed from ambient dictation for Shalom Montaño MD by Eugenia Valverde.  04/22/24   15:30 EDT    Patient or patient representative verbalized consent to the visit recording.  I have personally performed the services described in this document as transcribed by the above individual, and it is both accurate and complete.

## 2024-05-01 RX ORDER — LIDOCAINE HYDROCHLORIDE 10 MG/ML
52 INJECTION, SOLUTION EPIDURAL; INFILTRATION; INTRACAUDAL; PERINEURAL
Status: COMPLETED | OUTPATIENT
Start: 2024-04-22 | End: 2024-04-22

## 2024-05-01 RX ORDER — TRIAMCINOLONE ACETONIDE 40 MG/ML
80 INJECTION, SUSPENSION INTRA-ARTICULAR; INTRAMUSCULAR
Status: COMPLETED | OUTPATIENT
Start: 2024-04-22 | End: 2024-04-22

## 2024-07-31 ENCOUNTER — OFFICE VISIT (OUTPATIENT)
Dept: ORTHOPEDIC SURGERY | Facility: CLINIC | Age: 74
End: 2024-07-31
Payer: MEDICARE

## 2024-07-31 VITALS — BODY MASS INDEX: 23.86 KG/M2 | WEIGHT: 152 LBS | HEIGHT: 67 IN

## 2024-07-31 DIAGNOSIS — M19.011 PRIMARY OSTEOARTHRITIS OF RIGHT SHOULDER: Primary | ICD-10-CM

## 2024-07-31 RX ORDER — TRIAMCINOLONE ACETONIDE 40 MG/ML
80 INJECTION, SUSPENSION INTRA-ARTICULAR; INTRAMUSCULAR
Status: COMPLETED | OUTPATIENT
Start: 2024-07-31 | End: 2024-07-31

## 2024-07-31 RX ORDER — LIDOCAINE HYDROCHLORIDE 10 MG/ML
4 INJECTION, SOLUTION EPIDURAL; INFILTRATION; INTRACAUDAL; PERINEURAL
Status: COMPLETED | OUTPATIENT
Start: 2024-07-31 | End: 2024-07-31

## 2024-07-31 RX ADMIN — TRIAMCINOLONE ACETONIDE 80 MG: 40 INJECTION, SUSPENSION INTRA-ARTICULAR; INTRAMUSCULAR at 13:06

## 2024-07-31 RX ADMIN — LIDOCAINE HYDROCHLORIDE 4 ML: 10 INJECTION, SOLUTION EPIDURAL; INFILTRATION; INTRACAUDAL; PERINEURAL at 13:06

## 2024-07-31 NOTE — PROGRESS NOTES
Subjective:     Patient ID: Mason Angel is a 73 y.o. male.    Chief Complaint:  Follow-up right shoulder pain, DJD, status post revision rotator cuff repair and distal clavicle excision September 2022  Last injection right shoulder subacromial bursa 4/22/2024    History of Present Illness  History of Present Illness  The patient returns to clinic today for follow-up evaluation in regards to his right shoulder.    The patient reports that the previous injection administered via a subacromial approach was highly effective. Over the past week, he has observed a slight increase in pain levels, which he describes as moderate in intensity and aching. Occasionally, he experiences sharp pain, particularly at night or during repetitive overhead activities. He denies any associated numbness, tingling, or radiation of the pain.     Social History     Occupational History    Not on file   Tobacco Use    Smoking status: Never     Passive exposure: Never    Smokeless tobacco: Never   Vaping Use    Vaping status: Never Used   Substance and Sexual Activity    Alcohol use: Yes     Alcohol/week: 2.0 standard drinks of alcohol     Types: 2 Cans of beer per week     Comment: daily    Drug use: Never    Sexual activity: Defer      Past Medical History:   Diagnosis Date    GERD (gastroesophageal reflux disease)     Hyperlipidemia     Stroke 03/2014    affected left side, limited strength left arm     Past Surgical History:   Procedure Laterality Date    APPENDECTOMY      BICEPS TENDONESIS SUBPECTORALIS REPAIR Right 06/25/2021    Procedure: BICEPS TENDONESIS SUBPECTORALIS MINI OPEN REPAIR;  Surgeon: Shalom Montaño MD;  Location: [facility];  Service: Orthopedics;  Laterality: Right;    FOOT SURGERY Left     x2 crush injury w/nerve damage    LAPAROSCOPIC CHOLECYSTECTOMY      NASAL SEPTUM SURGERY      SHOULDER ARTHROSCOPY W/ ROTATOR CUFF REPAIR Right 06/25/2021    Procedure: SHOULDER ARTHROSCOPY WITH ROTATOR CUFF REPAIR;  Surgeon:  "Shalom Montaño MD;  Location:  [facility];  Service: Orthopedics;  Laterality: Right;  RIGHT SHOULDER ARTHROSCOPY WITH ROTATOR CUFF REPAIR    SHOULDER ARTHROSCOPY W/ ROTATOR CUFF REPAIR Right 9/9/2022    Procedure: SHOULDER ARTHROSCOPY WITH ROTATOR CUFF,  distal clavicle excision;  Surgeon: Shalom Montaño MD;  Location:  [facility];  Service: Orthopedics;  Laterality: Right;       Family History   Problem Relation Age of Onset    Ovarian cancer Mother     COPD Father     Malig Hyperthermia Neg Hx          Review of Systems        Objective:  Vitals:    07/31/24 1301   Weight: 68.9 kg (152 lb)   Height: 170.2 cm (67\")         07/31/24  1301   Weight: 68.9 kg (152 lb)     Body mass index is 23.81 kg/m².  General: No acute distress.  Resp: normal respiratory effort  Skin: no rashes or wounds; normal turgor  Psych: mood and affect appropriate; recent and remote memory intact          Physical Exam  Right shoulder incision is well healed. Active forward flexion is 165 degrees, 4+ out of 5 strength, external rotation 50 degrees, 4 plus out of 5 strength. Deltoid is firing in all three components. Maximal tenderness is along the anterior and posterior glenohumeral joint line with mild crepitus. Brisk cap refill to all digits, 2+ radial pulse right wrist.         Imaging:  Right Shoulder X-Ray  Indication: Pain  AP, scapular Y, and axillary lateral views    Findings:  No fracture  No bony lesion  Normal soft tissues  Moderate glenohumeral joint space narrowing with moderate humeral head elevation  Compared to prior office x-rays    Assessment:        1. Primary osteoarthritis of right shoulder           Plan:  Large Joint Arthrocentesis: R subacromial bursa  Date/Time: 7/31/2024 1:06 PM  Consent given by: patient  Site marked: site marked  Timeout: Immediately prior to procedure a time out was called to verify the correct patient, procedure, equipment, support staff and site/side marked as required   Supporting " Documentation  Indications: pain   Procedure Details  Location: shoulder - R subacromial bursa  Preparation: Patient was prepped and draped in the usual sterile fashion  Needle size: 22 G  Approach: superior  Medications administered: 80 mg triamcinolone acetonide 40 MG/ML; 4 mL lidocaine PF 1% 1 %  Patient tolerance: patient tolerated the procedure well with no immediate complications                Assessment & Plan  1. Right shoulder pain.  A comprehensive discussion was held with the patient regarding the potential treatment options. Upon reviewing his x-rays, it was observed that his right shoulder continues to exhibit signs of moderate degenerative changes. The option of reverse shoulder arthroplasty was considered, however, he expressed a preference against surgical intervention at this time, expressing a preference for conservative treatment. He expressed a desire to proceed with a repeat right shoulder subacromial injection today, given the recurrence of pain. He will persist with home exercises for range of motion and strength of the shoulder as tolerated. All his queries were addressed.    Patient would like to proceed with cortisone injection today to the right shoulder subacromial bursa. Recommended limited use of affected extremity for the next 24 hours to only essential activites other than work on general active and passive motion. Recommended supplementing with ice and soft tissue massage. Discussed with patient that they should see results in 5-7 days, if no improvement in 5-6 weeks I have asked them to call the office to review other options. Patient should call office immediately if they notice redness, warmth, fevers, chills, or residual numbness or tingling for greater than 6 hours after injection.       Follow-up  A follow-up appointment is scheduled for 3 months from now, or sooner if necessary.    Mason Angel was in agreement with plan and had all questions answered.     Orders:  Orders  Placed This Encounter   Procedures    Large Joint Arthrocentesis: R subacromial bursa    XR Shoulder 2+ View Right       Medications:  No orders of the defined types were placed in this encounter.      Followup:  Return in about 3 months (around 10/31/2024).    Diagnoses and all orders for this visit:    1. Primary osteoarthritis of right shoulder (Primary)  -     XR Shoulder 2+ View Right  -     Large Joint Arthrocentesis: R subacromial bursa          Dictated utilizing Dragon dictation     Patient or patient representative verbalized consent for the use of Ambient Listening during the visit with  Shalom Montaño MD for chart documentation. 7/31/2024  13:19 EDT

## 2024-10-30 ENCOUNTER — OFFICE VISIT (OUTPATIENT)
Dept: ORTHOPEDIC SURGERY | Facility: CLINIC | Age: 74
End: 2024-10-30
Payer: MEDICARE

## 2024-10-30 VITALS — BODY MASS INDEX: 24.99 KG/M2 | HEIGHT: 67 IN | WEIGHT: 159.2 LBS

## 2024-10-30 DIAGNOSIS — M75.51 BURSITIS OF RIGHT SHOULDER: ICD-10-CM

## 2024-10-30 DIAGNOSIS — Z98.890 STATUS POST ARTHROSCOPY OF SHOULDER: ICD-10-CM

## 2024-10-30 DIAGNOSIS — M19.011 PRIMARY OSTEOARTHRITIS OF RIGHT SHOULDER: Primary | ICD-10-CM

## 2024-10-30 RX ADMIN — LIDOCAINE HYDROCHLORIDE 4 ML: 10 INJECTION, SOLUTION EPIDURAL; INFILTRATION; INTRACAUDAL; PERINEURAL at 09:53

## 2024-10-30 RX ADMIN — TRIAMCINOLONE ACETONIDE 80 MG: 40 INJECTION, SUSPENSION INTRA-ARTICULAR; INTRAMUSCULAR at 09:53

## 2024-10-30 NOTE — PROGRESS NOTES
Subjective:     Patient ID: Mason Angel is a 73 y.o. male.    Chief Complaint:  Follow-up right shoulder pain, DJD, status post revision rotator cuff repair and distal clavicle excision September 2022  Last injection right shoulder subacromial bursa 7/31/2024    History of Present Illness  History of Present Illness  The patient returns to the clinic today for a follow-up evaluation regarding his right shoulder.    He reports that the last injection provided significant relief. However, over the past 2 to 3 weeks, he has experienced escalating pain, particularly on the front and side of his right shoulder. He rates this pain as a 6 to 7 out of 10, describing it as an ache that intensifies when he raises his arm overhead. He also experiences considerable discomfort at night when lying on his right side.     He reports no systemic symptoms such as fever, chills, or sweats. The pain mildly radiates down the side of his arm but does not extend below the elbow.     Social History     Occupational History    Not on file   Tobacco Use    Smoking status: Never     Passive exposure: Never    Smokeless tobacco: Never   Vaping Use    Vaping status: Never Used   Substance and Sexual Activity    Alcohol use: Yes     Alcohol/week: 2.0 standard drinks of alcohol     Types: 2 Cans of beer per week     Comment: daily    Drug use: Never    Sexual activity: Defer      Past Medical History:   Diagnosis Date    GERD (gastroesophageal reflux disease)     Hyperlipidemia     Stroke 03/2014    affected left side, limited strength left arm     Past Surgical History:   Procedure Laterality Date    APPENDECTOMY      BICEPS TENDONESIS SUBPECTORALIS REPAIR Right 06/25/2021    Procedure: BICEPS TENDONESIS SUBPECTORALIS MINI OPEN REPAIR;  Surgeon: Shalom Montaño MD;  Location: [facility];  Service: Orthopedics;  Laterality: Right;    FOOT SURGERY Left     x2 crush injury w/nerve damage    LAPAROSCOPIC CHOLECYSTECTOMY      NASAL SEPTUM  "SURGERY      SHOULDER ARTHROSCOPY W/ ROTATOR CUFF REPAIR Right 06/25/2021    Procedure: SHOULDER ARTHROSCOPY WITH ROTATOR CUFF REPAIR;  Surgeon: Shalom Montaño MD;  Location:  [facility];  Service: Orthopedics;  Laterality: Right;  RIGHT SHOULDER ARTHROSCOPY WITH ROTATOR CUFF REPAIR    SHOULDER ARTHROSCOPY W/ ROTATOR CUFF REPAIR Right 9/9/2022    Procedure: SHOULDER ARTHROSCOPY WITH ROTATOR CUFF,  distal clavicle excision;  Surgeon: Shalom Montaño MD;  Location:  [facility];  Service: Orthopedics;  Laterality: Right;       Family History   Problem Relation Age of Onset    Ovarian cancer Mother     COPD Father     Malig Hyperthermia Neg Hx          Review of Systems        Objective:  Vitals:    10/30/24 0949   Weight: 72.2 kg (159 lb 3.2 oz)   Height: 170.2 cm (67\")         10/30/24  0949   Weight: 72.2 kg (159 lb 3.2 oz)     Body mass index is 24.93 kg/m².  Physical Exam    Vital signs reviewed.   General: No acute distress, alert and oriented  Eyes: conjunctiva clear; pupils equally round and reactive  ENT: external ears and nose atraumatic; oropharynx clear  CV: no peripheral edema  Resp: normal respiratory effort  Skin: no rashes or wounds; normal turgor  Psych: mood and affect appropriate; recent and remote memory intact          Physical Exam  Right shoulder exhibits maximal tenderness to palpation over the lateral subacromial space extending into the anterior and posterior glenohumeral joint line with mild crepitus. Active forward flexion is 160 degrees, strength is 4+ out of 5, external rotation is 45 degrees, strength is 4+ out of 5, internal rotation is T10, strength is 5 out of 5, belly press test is positive, deltoid components are positive, Cuenca and Neer's are positive, empty can test is positive, drop arm test is negative, external rotation lag sign is negative, cap refill to all digits is brisk, radial pulse is 2+.         Imaging:  None today  Assessment:        1. Primary osteoarthritis of " right shoulder    2. Status post arthroscopy of shoulder    3. Bursitis of right shoulder           Plan:    - Large Joint Arthrocentesis: R subacromial bursa on 10/30/2024 9:53 AM  Indications: pain  Details: 22 G needle, lateral approach  Medications: 80 mg triamcinolone acetonide 40 MG/ML; 4 mL lidocaine PF 1% 1 %  Outcome: tolerated well, no immediate complications  Procedure, treatment alternatives, risks and benefits explained, specific risks discussed. Consent was given by the patient. Immediately prior to procedure a time out was called to verify the correct patient, procedure, equipment, support staff and site/side marked as required. Patient was prepped and draped in the usual sterile fashion.                 Assessment & Plan  1. Right shoulder pain.  He reports increasing pain over the anterior and lateral aspect of his right shoulder, rated as 6-7 out of 10, particularly worse with overhead positioning and at night when lying on his right side. The pain is described as aching in nature, with mild radiation on the lateral aspect of the arm but not below the elbow. On examination, there is maximal tenderness to palpation over the lateral subacromial space extending into the anterior and posterior glenohumeral joint line with mild crepitus. Positive deltoid components, Cuenca and Neer's tests, and empty can test were noted. Negative drop arm test and external rotation lag sign. Active forward flexion is 160 degrees with 4+ out of 5 strength, external rotation is 45 degrees with 4+ out of 5 strength, and internal rotation is T10 with 5 out of 5 strength.     Given the significant recurrence of pain, he opted for a repeat injection today. The option for reverse shoulder arthroplasty was discussed at length, but he prefers to hold off on that. He feels his pain is fairly well controlled with conservative treatment. He will continue with home exercises to work on range of motion.    Patient would like to  proceed with cortisone injection today to the right shoulder subacromial space. Recommended limited use of affected extremity for the next 24 hours to only essential activites other than work on general active and passive motion. Recommended supplementing with ice and soft tissue massage. Discussed with patient that they should see results in 5-7 days, if no improvement in 5-6 weeks I have asked them to call the office to review other options. Patient should call office immediately if they notice redness, warmth, fevers, chills, or residual numbness or tingling for greater than 6 hours after injection.       Follow-up  Return in 3 months or sooner if needed.    Mason Angel was in agreement with plan and had all questions answered.     Orders:  Orders Placed This Encounter   Procedures    - Large Joint Arthrocentesis: R subacromial bursa       Medications:  No orders of the defined types were placed in this encounter.      Followup:  Return in about 3 months (around 1/30/2025).    Diagnoses and all orders for this visit:    1. Primary osteoarthritis of right shoulder (Primary)    2. Status post arthroscopy of shoulder    3. Bursitis of right shoulder    Other orders  -     - Large Joint Arthrocentesis: R subacromial bursa          BMI is within normal parameters. No other follow-up for BMI required.       Dictated utilizing Dragon dictation     Patient or patient representative verbalized consent for the use of Ambient Listening during the visit with  Shalom Montaño MD for chart documentation. 11/15/2024  10:10 EDT

## 2024-11-15 RX ORDER — TRIAMCINOLONE ACETONIDE 40 MG/ML
80 INJECTION, SUSPENSION INTRA-ARTICULAR; INTRAMUSCULAR
Status: COMPLETED | OUTPATIENT
Start: 2024-10-30 | End: 2024-10-30

## 2024-11-15 RX ORDER — LIDOCAINE HYDROCHLORIDE 10 MG/ML
4 INJECTION, SOLUTION EPIDURAL; INFILTRATION; INTRACAUDAL; PERINEURAL
Status: COMPLETED | OUTPATIENT
Start: 2024-10-30 | End: 2024-10-30

## 2025-01-24 ENCOUNTER — OFFICE VISIT (OUTPATIENT)
Dept: ORTHOPEDIC SURGERY | Facility: CLINIC | Age: 75
End: 2025-01-24
Payer: MEDICARE

## 2025-01-24 VITALS — HEIGHT: 67 IN | BODY MASS INDEX: 24.93 KG/M2

## 2025-01-24 DIAGNOSIS — M19.011 PRIMARY OSTEOARTHRITIS OF RIGHT SHOULDER: Primary | ICD-10-CM

## 2025-01-24 DIAGNOSIS — Z98.890 STATUS POST ARTHROSCOPY OF SHOULDER: ICD-10-CM

## 2025-01-24 DIAGNOSIS — M75.51 BURSITIS OF RIGHT SHOULDER: ICD-10-CM

## 2025-01-24 RX ADMIN — LIDOCAINE HYDROCHLORIDE 4 ML: 10 INJECTION, SOLUTION EPIDURAL; INFILTRATION; INTRACAUDAL; PERINEURAL at 13:34

## 2025-01-24 RX ADMIN — TRIAMCINOLONE ACETONIDE 80 MG: 40 INJECTION, SUSPENSION INTRA-ARTICULAR; INTRAMUSCULAR at 13:34

## 2025-01-24 NOTE — PROGRESS NOTES
Subjective:     Patient ID: Mason Angel is a 74 y.o. male.    Chief Complaint:  Right shoulder pain, new issue to examiner  Rotator cuff repair 2021, rotator cuff repair revision 2022  History of Present Illness  History of Present Illness    Mason Angel presents to clinic today with spouse for evaluation of his right shoulder.  Prior rotator cuff repair followed by revision of the shoulder initial surgery 2021 revision repair in 2022.  He is currently being treated by Dr. Montaño new issue to examiner.  Denies known recent injury.  This has been an ongoing issue with increased pain at the shoulder.  Is experiencing pain with reaching overhead, reaching out to the side reaching back behind his back and with activities of daily living.  He has noted reduced strength and activity tolerance of the right upper extremity compared to that of the left.  Pain present along the anterior glenohumeral aspect pain also present along the lateral acromion and across the acromion and posterior aspect of the shoulder.  No current paresthesia at the upper extremity.  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  He received approximately 4 weeks symptom improvement with most recent corticosteroid injections completed 3 months ago.  He is experiencing pain with motion of the shoulder activities of daily living.  Denies any presence of numbness or tingling, prior shoulder scope with revision.  Denies any other concerns present.       Social History     Occupational History    Not on file   Tobacco Use    Smoking status: Never     Passive exposure: Never    Smokeless tobacco: Never   Vaping Use    Vaping status: Never Used   Substance and Sexual Activity    Alcohol use: Yes     Alcohol/week: 2.0 standard drinks of alcohol     Types: 2 Cans of beer per  "week     Comment: daily    Drug use: Never    Sexual activity: Defer      Past Medical History:   Diagnosis Date    GERD (gastroesophageal reflux disease)     Hyperlipidemia     Stroke 03/2014    affected left side, limited strength left arm     Past Surgical History:   Procedure Laterality Date    APPENDECTOMY      BICEPS TENDONESIS SUBPECTORALIS REPAIR Right 06/25/2021    Procedure: BICEPS TENDONESIS SUBPECTORALIS MINI OPEN REPAIR;  Surgeon: Shalom Montaño MD;  Location: [facility];  Service: Orthopedics;  Laterality: Right;    FOOT SURGERY Left     x2 crush injury w/nerve damage    LAPAROSCOPIC CHOLECYSTECTOMY      NASAL SEPTUM SURGERY      SHOULDER ARTHROSCOPY W/ ROTATOR CUFF REPAIR Right 06/25/2021    Procedure: SHOULDER ARTHROSCOPY WITH ROTATOR CUFF REPAIR;  Surgeon: Shalom Montaño MD;  Location:  [facility];  Service: Orthopedics;  Laterality: Right;  RIGHT SHOULDER ARTHROSCOPY WITH ROTATOR CUFF REPAIR    SHOULDER ARTHROSCOPY W/ ROTATOR CUFF REPAIR Right 9/9/2022    Procedure: SHOULDER ARTHROSCOPY WITH ROTATOR CUFF,  distal clavicle excision;  Surgeon: Shalom Montaño MD;  Location:  [facility];  Service: Orthopedics;  Laterality: Right;       Family History   Problem Relation Age of Onset    Ovarian cancer Mother     COPD Father     Malig Hyperthermia Neg Hx                Objective:  Physical Exam    Vital signs reviewed.   General: No acute distress.  Eyes: conjunctiva clear; pupils equally round and reactive  ENT: external ears and nose atraumatic; oropharynx clear  CV: no peripheral edema  Resp: normal respiratory effort  Skin: no rashes or wounds; normal turgor  Psych: mood and affect appropriate; recent and remote memory intact    Vitals:    01/24/25 1325   Height: 170.2 cm (67.01\")     There were no vitals filed for this visit.  Body mass index is 24.93 kg/m².      Ortho Exam     Physical Exam    Right shoulder examined  Positive deltoid firing  Positive sensation light touch all " distributions right upper extremity  Flex/extend all digits right hand  Wrist cap refill all digits right hand  2+ distal radius pulse  Maximal tenderness present anterior glenohumeral, posterior joint line, lateral acromion  Assessment:        1. Primary osteoarthritis of right shoulder    2. Status post arthroscopy of shoulder    3. Bursitis of right shoulder         Assessment & Plan      Plan:  1.  Discussed plan of care with patient.  Long discussion with patient and family regarding expectations with reverse total shoulder we did discuss may take up to a year for complete symptom resolution, plan would moving forward post treatment would include sling for 4 to 6 weeks starting physical therapy between 4 and 6 weeks.  He would be limited to weight lifting, discussed the risks including pain even after the year isadora, risk of dislocation initially.  At this time he wishes to hold off on surgical intervention he does wish to proceed with corticosteroid injection right shoulder subacromial approach will plan to have him follow-up with Dr. Montaño in 3 months.  We did discuss if he is not receiving relief does choose to proceed with reverse total shoulder to contact office we can proceed with CT for surgical planning.  Encouraged to call with any question concerns.  All question answered.  - Large Joint Arthrocentesis: R subacromial bursa on 1/24/2025 1:34 PM  Indications: pain  Details: 22 G needle, lateral approach  Medications: 80 mg triamcinolone acetonide 40 MG/ML; 4 mL lidocaine PF 1% 1 %  Outcome: tolerated well, no immediate complications  Procedure, treatment alternatives, risks and benefits explained, specific risks discussed. Consent was given by the patient. Immediately prior to procedure a time out was called to verify the correct patient, procedure, equipment, support staff and site/side marked as required. Patient was prepped and draped in the usual sterile fashion.         Orders:  Orders Placed This  Encounter   Procedures    - Large Joint Arthrocentesis: R subacromial bursa     No orders of the defined types were placed in this encounter.      Dragon dictation utilized  BMI is within normal parameters. No other follow-up for BMI required.       Patient or patient representative verbalized consent for the use of Ambient Listening during the visit with  LUANA Hatch for chart documentation. 1/25/2025  06:52 EST

## 2025-01-25 RX ORDER — TRIAMCINOLONE ACETONIDE 40 MG/ML
80 INJECTION, SUSPENSION INTRA-ARTICULAR; INTRAMUSCULAR
Status: COMPLETED | OUTPATIENT
Start: 2025-01-24 | End: 2025-01-24

## 2025-01-25 RX ORDER — LIDOCAINE HYDROCHLORIDE 10 MG/ML
4 INJECTION, SOLUTION EPIDURAL; INFILTRATION; INTRACAUDAL; PERINEURAL
Status: COMPLETED | OUTPATIENT
Start: 2025-01-24 | End: 2025-01-24

## 2025-01-29 ENCOUNTER — TELEPHONE (OUTPATIENT)
Dept: ORTHOPEDIC SURGERY | Facility: CLINIC | Age: 75
End: 2025-01-29

## 2025-01-29 NOTE — TELEPHONE ENCOUNTER
Provider: LAZARO CASTILLO     Caller: Mason Angel    Relationship to Patient: Self    Phone Number: [phone]    Reason for Call: PATIENT WAS IN THE OFFICE LAST WEEK AND ASKED TO CALL BACK WITH HIS MEDICARE ID NUMBER WHICH IS [id number].  CONFIRMED ANTHEM MEDICARE WHICH IS VERIFIED.

## 2025-04-28 ENCOUNTER — OFFICE VISIT (OUTPATIENT)
Dept: ORTHOPEDIC SURGERY | Facility: CLINIC | Age: 75
End: 2025-04-28
Payer: MEDICARE

## 2025-04-28 VITALS — WEIGHT: 159 LBS | HEIGHT: 67 IN | BODY MASS INDEX: 24.96 KG/M2

## 2025-04-28 DIAGNOSIS — M19.011 PRIMARY OSTEOARTHRITIS OF RIGHT SHOULDER: Primary | ICD-10-CM

## 2025-04-28 DIAGNOSIS — M94.261 CHONDROMALACIA OF KNEE, RIGHT: ICD-10-CM

## 2025-04-28 DIAGNOSIS — M25.561 ACUTE PAIN OF RIGHT KNEE: ICD-10-CM

## 2025-04-28 DIAGNOSIS — Z98.890 STATUS POST ARTHROSCOPY OF SHOULDER: ICD-10-CM

## 2025-04-28 DIAGNOSIS — M75.51 BURSITIS OF RIGHT SHOULDER: ICD-10-CM

## 2025-04-28 RX ORDER — ALFUZOSIN HYDROCHLORIDE 10 MG/1
10 TABLET, EXTENDED RELEASE ORAL
COMMUNITY

## 2025-04-28 RX ORDER — MIRTAZAPINE 15 MG/1
15 TABLET, FILM COATED ORAL
COMMUNITY
Start: 2025-01-30

## 2025-04-28 RX ORDER — ROSUVASTATIN CALCIUM 20 MG/1
20 TABLET, COATED ORAL
COMMUNITY
Start: 2025-01-30

## 2025-04-28 RX ORDER — VALACYCLOVIR HYDROCHLORIDE 500 MG/1
500 TABLET, FILM COATED ORAL
COMMUNITY
Start: 2024-11-17

## 2025-04-28 RX ORDER — FINASTERIDE 5 MG/1
5 TABLET, FILM COATED ORAL
COMMUNITY

## 2025-04-28 RX ADMIN — TRIAMCINOLONE ACETONIDE 80 MG: 40 INJECTION, SUSPENSION INTRA-ARTICULAR; INTRAMUSCULAR at 13:56

## 2025-04-28 RX ADMIN — LIDOCAINE HYDROCHLORIDE 8 ML: 10 INJECTION, SOLUTION EPIDURAL; INFILTRATION; INTRACAUDAL; PERINEURAL at 14:46

## 2025-04-28 RX ADMIN — LIDOCAINE HYDROCHLORIDE 4 ML: 10 INJECTION, SOLUTION EPIDURAL; INFILTRATION; INTRACAUDAL; PERINEURAL at 13:56

## 2025-04-28 RX ADMIN — TRIAMCINOLONE ACETONIDE 80 MG: 40 INJECTION, SUSPENSION INTRA-ARTICULAR; INTRAMUSCULAR at 14:46

## 2025-04-28 NOTE — PROGRESS NOTES
Subjective:     Patient ID: Mason Angel is a 74 y.o. male.    Chief Complaint:  Follow-up right shoulder pain, DJD, status post revision rotator cuff repair and distal clavicle excision September 2022  Last injection right shoulder subacromial bursa 10/30/2024    Right knee pain, new issue  History of Present Illness  The patient presents for follow-up of his right shoulder and a new evaluation of his right knee.    An injection to the subacromial bursa was administered 4 months ago, providing significant relief with a 90% reduction in pain. However, over the last 2 to 3 weeks, escalating pain has been experienced, now rated as 7 to 8 out of 10. The pain is described as aching and localized over the lateral subacromial space, extending to the anterior posterior glenohumeral joint, accompanied by crepitus. No radiation of pain, numbness, or tingling is reported.    Over the last 4 to 6 weeks, increasing pain has been noted in the medial and anterior aspects of the right knee. No injury is reported, and the pain is rated as 6 to 7 out of 10, described as aching. Moderate swelling that fluctuates in severity is present. The pain worsens with prolonged walking, weightbearing, and deep flexion activities, with minimal improvement from soft tissue massage, rest, and anti-inflammatory medications. Mild swelling that fluctuates is reported, but no instances of knee locking.    SOCIAL HISTORY  Exercise: Home exercise, range of motion, strength as tolerated      Social History     Occupational History    Not on file   Tobacco Use    Smoking status: Never     Passive exposure: Never    Smokeless tobacco: Never   Vaping Use    Vaping status: Never Used   Substance and Sexual Activity    Alcohol use: Yes     Alcohol/week: 2.0 standard drinks of alcohol     Types: 2 Cans of beer per week     Comment: daily    Drug use: Never    Sexual activity: Defer      Past Medical History:   Diagnosis Date    GERD (gastroesophageal  "reflux disease)     Hyperlipidemia     Stroke 03/2014    affected left side, limited strength left arm     Past Surgical History:   Procedure Laterality Date    APPENDECTOMY      BICEPS TENDONESIS SUBPECTORALIS REPAIR Right 06/25/2021    Procedure: BICEPS TENDONESIS SUBPECTORALIS MINI OPEN REPAIR;  Surgeon: Shalom Montaño MD;  Location: [facility] OR;  Service: Orthopedics;  Laterality: Right;    FOOT SURGERY Left     x2 crush injury w/nerve damage    LAPAROSCOPIC CHOLECYSTECTOMY      NASAL SEPTUM SURGERY      SHOULDER ARTHROSCOPY W/ ROTATOR CUFF REPAIR Right 06/25/2021    Procedure: SHOULDER ARTHROSCOPY WITH ROTATOR CUFF REPAIR;  Surgeon: Shalom Montaño MD;  Location:  [facility] OR;  Service: Orthopedics;  Laterality: Right;  RIGHT SHOULDER ARTHROSCOPY WITH ROTATOR CUFF REPAIR    SHOULDER ARTHROSCOPY W/ ROTATOR CUFF REPAIR Right 9/9/2022    Procedure: SHOULDER ARTHROSCOPY WITH ROTATOR CUFF,  distal clavicle excision;  Surgeon: Shalom Montaño MD;  Location: [facility] OR;  Service: Orthopedics;  Laterality: Right;       Family History   Problem Relation Age of Onset    Ovarian cancer Mother     COPD Father     Malig Hyperthermia Neg Hx          Review of Systems        Objective:  Vitals:    04/28/25 1315   Weight: 72.1 kg (159 lb)   Height: 170.2 cm (67\")         04/28/25  1315   Weight: 72.1 kg (159 lb)     Body mass index is 24.9 kg/m².    Physical Exam    Vital signs reviewed.   General: No acute distress, alert and oriented  Eyes: conjunctiva clear; pupils equally round and reactive  ENT: external ears and nose atraumatic; oropharynx clear  CV: no peripheral edema  Resp: normal respiratory effort  Skin: no rashes or wounds; normal turgor  Psych: mood and affect appropriate; recent and remote memory intact       Physical Exam    - Musculoskeletal:    - Right Shoulder:      - Active forward flexion of 160 degrees      - Strength of 4+ out of 5      - External rotation of 45 degrees      - Strength of 4+ out of " 5      - Internal rotation to T10      - Strength of 5 out of 5 on belly press test      - Positive deltoid firing in all three components       - Positive Cuenca, Neer's, positive empty can test      - Negative drop arm test      - Negative external rotation lag sign  Brisk capillary refill to all digits, 2+ radial pulse      - Right Knee:      - Active range of motion from 3 to 125 degrees      - Strength of 4+ out of 5 on flexion and extension      - Mild effusion      - Maximal tenderness to palpation on medial joint line as well as medial and lateral patellar facet      - Positive active compression test      - Negative patellar apprehension test      - Grade 1A Lachman      - Negative anterior and posterior drawer      - Stable to varus and valgus stress at 3 and 30 degrees    - Others: No hip pain on logroll exam        Imaging:  Right knee X-Ray  Indication: Pain  AP, lateral and patella axial views    Findings:  No fracture  No bony lesion  Normal soft tissues  Moderate medial compartment joint space narrowing with minimal reactive osteophyte formation appreciated, slight overall varus alignment consistent with chondromalacia    No prior studies were available for comparison.    Assessment:        1. Primary osteoarthritis of right shoulder    2. Status post arthroscopy of shoulder    3. Bursitis of right shoulder    4. Acute pain of right knee    5. Chondromalacia of knee, right           Large Joint Arthrocentesis: R subacromial bursa  Date/Time: 4/28/2025 1:56 PM  Consent given by: patient  Site marked: site marked  Timeout: Immediately prior to procedure a time out was called to verify the correct patient, procedure, equipment, support staff and site/side marked as required   Supporting Documentation  Indications: pain   Procedure Details  Location: shoulder - R subacromial bursa  Preparation: Patient was prepped and draped in the usual sterile fashion  Needle size: 22 G  Approach: lateral  Medications  administered: 80 mg triamcinolone acetonide 40 MG/ML; 4 mL lidocaine PF 1% 1 %  Patient tolerance: patient tolerated the procedure well with no immediate complications        Assessment & Plan  Right shoulder subacromial bursitis:  Pain rated as 7-8/10, positive Cuenca, Neer's, positive empty can test, negative drop arm test, negative external rotation, lag sign.  Proceed with right shoulder subacromial injection. Continue home exercise, range of motion, and strength as tolerated. Options for shoulder arthroplasty discussed but deferred. Follow up as needed if symptoms recur. All questions answered.    Right knee chondromalacia and arthritic change:  Pain rated as 6-7/10, mild effusion, maximal tenderness to palpation medial joint line, positive active compression test, grade 1 Lachman, negative anterior and posterior drawer, stable to varus and valgus stress.  Proceed with right knee intra-articular injection. Continue home exercise, range of motion, and strength as tolerated. All questions answered.    PROCEDURE  Patient would like to proceed with cortisone injection today to the right shoulder subacromial space and right knee. Recommended limited use of affected extremity for the next 24 hours to only essential activites other than work on general active and passive motion. Recommended supplementing with ice and soft tissue massage. Discussed with patient that they should see results in 5-7 days, if no improvement in 5-6 weeks I have asked them to call the office to review other options. Patient should call office immediately if they notice redness, warmth, fevers, chills, or residual numbness or tingling for greater than 6 hours after injection.       Mason Angel was in agreement with plan and had all questions answered.     Orders:  Orders Placed This Encounter   Procedures    Large Joint Arthrocentesis: R subacromial bursa    Large Joint Arthrocentesis: R knee    XR Knee 3+ View With Correll Right        Medications:  No orders of the defined types were placed in this encounter.      Followup:  No follow-ups on file.    Diagnoses and all orders for this visit:    1. Primary osteoarthritis of right shoulder (Primary)  -     Large Joint Arthrocentesis: R subacromial bursa    2. Status post arthroscopy of shoulder  -     Large Joint Arthrocentesis: R subacromial bursa    3. Bursitis of right shoulder  -     Large Joint Arthrocentesis: R subacromial bursa    4. Acute pain of right knee  -     XR Knee 3+ View With Sunrise Right    5. Chondromalacia of knee, right    Other orders  -     Large Joint Arthrocentesis: R knee        BMI is within normal parameters. No other follow-up for BMI required.          Dictated utilizing Dragon dictation     Patient or patient representative verbalized consent for the use of Ambient Listening during the visit with  Shalom Montaño MD for chart documentation. 4/29/2025  13:38 EDT

## 2025-04-28 NOTE — PROGRESS NOTES
Large Joint Arthrocentesis: R knee  Date/Time: 4/28/2025 2:46 PM  Consent given by: patient  Site marked: site marked  Timeout: Immediately prior to procedure a time out was called to verify the correct patient, procedure, equipment, support staff and site/side marked as required   Supporting Documentation  Indications: pain   Procedure Details  Location: knee - R knee  Preparation: Patient was prepped and draped in the usual sterile fashion  Needle size: 22 G  Approach: anterolateral  Medications administered: 80 mg triamcinolone acetonide 40 MG/ML; 8 mL lidocaine PF 1% 1 %  Patient tolerance: patient tolerated the procedure well with no immediate complications

## 2025-04-29 RX ORDER — LIDOCAINE HYDROCHLORIDE 10 MG/ML
8 INJECTION, SOLUTION EPIDURAL; INFILTRATION; INTRACAUDAL; PERINEURAL
Status: COMPLETED | OUTPATIENT
Start: 2025-04-28 | End: 2025-04-28

## 2025-04-29 RX ORDER — TRIAMCINOLONE ACETONIDE 40 MG/ML
80 INJECTION, SUSPENSION INTRA-ARTICULAR; INTRAMUSCULAR
Status: COMPLETED | OUTPATIENT
Start: 2025-04-28 | End: 2025-04-28

## 2025-04-29 RX ORDER — LIDOCAINE HYDROCHLORIDE 10 MG/ML
4 INJECTION, SOLUTION EPIDURAL; INFILTRATION; INTRACAUDAL; PERINEURAL
Status: COMPLETED | OUTPATIENT
Start: 2025-04-28 | End: 2025-04-28

## 2025-07-30 ENCOUNTER — OFFICE VISIT (OUTPATIENT)
Dept: ORTHOPEDIC SURGERY | Facility: CLINIC | Age: 75
End: 2025-07-30
Payer: MEDICARE

## 2025-07-30 VITALS — BODY MASS INDEX: 25.55 KG/M2 | WEIGHT: 162.8 LBS | HEIGHT: 67 IN

## 2025-07-30 DIAGNOSIS — M19.011 PRIMARY OSTEOARTHRITIS OF RIGHT SHOULDER: Primary | ICD-10-CM

## 2025-07-30 DIAGNOSIS — M17.11 PRIMARY OSTEOARTHRITIS OF RIGHT KNEE: ICD-10-CM

## 2025-07-30 DIAGNOSIS — M94.261 CHONDROMALACIA OF KNEE, RIGHT: ICD-10-CM

## 2025-07-30 RX ORDER — LIDOCAINE HYDROCHLORIDE 10 MG/ML
8 INJECTION, SOLUTION EPIDURAL; INFILTRATION; INTRACAUDAL; PERINEURAL
Status: COMPLETED | OUTPATIENT
Start: 2025-07-30 | End: 2025-07-30

## 2025-07-30 RX ORDER — LIDOCAINE HYDROCHLORIDE 10 MG/ML
4 INJECTION, SOLUTION EPIDURAL; INFILTRATION; INTRACAUDAL; PERINEURAL
Status: COMPLETED | OUTPATIENT
Start: 2025-07-30 | End: 2025-07-30

## 2025-07-30 RX ORDER — TRIAMCINOLONE ACETONIDE 40 MG/ML
80 INJECTION, SUSPENSION INTRA-ARTICULAR; INTRAMUSCULAR
Status: COMPLETED | OUTPATIENT
Start: 2025-07-30 | End: 2025-07-30

## 2025-07-30 RX ADMIN — LIDOCAINE HYDROCHLORIDE 8 ML: 10 INJECTION, SOLUTION EPIDURAL; INFILTRATION; INTRACAUDAL; PERINEURAL at 10:43

## 2025-07-30 RX ADMIN — TRIAMCINOLONE ACETONIDE 80 MG: 40 INJECTION, SUSPENSION INTRA-ARTICULAR; INTRAMUSCULAR at 10:43

## 2025-07-30 RX ADMIN — LIDOCAINE HYDROCHLORIDE 4 ML: 10 INJECTION, SOLUTION EPIDURAL; INFILTRATION; INTRACAUDAL; PERINEURAL at 10:45

## 2025-07-30 RX ADMIN — TRIAMCINOLONE ACETONIDE 80 MG: 40 INJECTION, SUSPENSION INTRA-ARTICULAR; INTRAMUSCULAR at 10:45

## 2025-07-30 NOTE — PROGRESS NOTES
Subjective:     Patient ID: Mason Angel is a 74 y.o. male.    Chief Complaint:  Follow-up right shoulder pain, DJD, status post revision rotator cuff repair and distal clavicle excision September 2022     F/u right knee pain, DJD  History of Present Illness  The patient returns to the clinic today for a follow-up evaluation regarding his right shoulder and right knee.    He had an injection at the end of 04/2025, which significantly improved his condition at both sites. However, he has recently experienced a moderate increase in pain in his right shoulder, particularly over the last 1 to 2 weeks. The pain is localized over the anterior and lateral aspects of the shoulder, intensifying with overhead activities and during sleep. He rates the pain as a 7 out of 10, describing it as aching. He reports no numbness, tingling, fevers, chills, or sweats. The pain does not radiate below the elbow.    In regards to his right knee, he has noticed increasing pain, especially when standing upright, walking for extended periods, and performing deep flexion activities. The pain is primarily located in the anterior medial aspect of his right knee. He also experiences moderate swelling that fluctuates. He reports no locking or catching sensations but does experience occasional sharp pain over the medial joint line. He reports no hip or groin pain at this time.    Social History     Occupational History    Not on file   Tobacco Use    Smoking status: Never     Passive exposure: Never    Smokeless tobacco: Never   Vaping Use    Vaping status: Never Used   Substance and Sexual Activity    Alcohol use: Yes     Alcohol/week: 2.0 standard drinks of alcohol     Types: 2 Cans of beer per week     Comment: daily    Drug use: Never    Sexual activity: Defer      Past Medical History:   Diagnosis Date    GERD (gastroesophageal reflux disease)     Hyperlipidemia     Stroke 03/2014    affected left side, limited strength left arm     Past  "Surgical History:   Procedure Laterality Date    APPENDECTOMY      BICEPS TENDONESIS SUBPECTORALIS REPAIR Right 06/25/2021    Procedure: BICEPS TENDONESIS SUBPECTORALIS MINI OPEN REPAIR;  Surgeon: Shalom Montaño MD;  Location:  [facility] OR;  Service: Orthopedics;  Laterality: Right;    FOOT SURGERY Left     x2 crush injury w/nerve damage    LAPAROSCOPIC CHOLECYSTECTOMY      NASAL SEPTUM SURGERY      SHOULDER ARTHROSCOPY W/ ROTATOR CUFF REPAIR Right 06/25/2021    Procedure: SHOULDER ARTHROSCOPY WITH ROTATOR CUFF REPAIR;  Surgeon: Shalom Montaño MD;  Location:  [facility] OR;  Service: Orthopedics;  Laterality: Right;  RIGHT SHOULDER ARTHROSCOPY WITH ROTATOR CUFF REPAIR    SHOULDER ARTHROSCOPY W/ ROTATOR CUFF REPAIR Right 9/9/2022    Procedure: SHOULDER ARTHROSCOPY WITH ROTATOR CUFF,  distal clavicle excision;  Surgeon: Shalom Montaño MD;  Location:  [facility] OR;  Service: Orthopedics;  Laterality: Right;       Family History   Problem Relation Age of Onset    Ovarian cancer Mother     COPD Father     Malig Hyperthermia Neg Hx          Review of Systems        Objective:  Vitals:    07/30/25 0956   Weight: 73.8 kg (162 lb 12.8 oz)   Height: 170.2 cm (67.01\")         07/30/25  0956   Weight: 73.8 kg (162 lb 12.8 oz)     Body mass index is 25.49 kg/m².    Physical Exam    Vital signs reviewed.   General: No acute distress, alert and oriented  Eyes: conjunctiva clear; pupils equally round and reactive  ENT: external ears and nose atraumatic; oropharynx clear  CV: no peripheral edema  Resp: normal respiratory effort  Skin: no rashes or wounds; normal turgor  Psych: mood and affect appropriate; recent and remote memory intact       Physical Exam  - Musculoskeletal:    - Right shoulder:      - Active forward flexion: 160 degrees, strength 4+ out of 5      - External rotation: 45 degrees, strength 4+ out of 5      - Internal rotation: T10, strength 5 out of 5 on belly press test      - Positive deltoid firing      - " Positive Cuenca      - Positive Neer's      - Positive empty can test      - Negative drop arm test      - Negative external rotation and lag sign    - Right knee:      - Active range of motion: 3 to 120 degrees      - Strength: 4+ out of 5 in flexion and extension      - Moderate effusion      - Maximal tenderness to palpation at medial joint line and medial and lateral patellar facets      - Stable to varus and valgus stress at three and 30 degrees        Imaging:  Right Shoulder X-Ray  Indication: Pain  AP, scapular Y, and axillary lateral views    Findings:  No fracture  No bony lesion  Normal soft tissues  Moderate glenohumeral joint space narrowing, mild humeral head elevation noted, minimal reactive osteophyte formation inferior glenoid appreciated.  Compared to prior office x-rays    Assessment:        1. Primary osteoarthritis of right shoulder    2. Chondromalacia of knee, right    3. Primary osteoarthritis of right knee             Assessment & Plan  1. Right shoulder pain:  Moderate increase in pain over the last 1 to 2 weeks, localized over the anterior and lateral aspect of the right shoulder, worse with overhead activities and at night. Pain is rated as 7 out of 10, aching in nature, without numbness, tingling, fevers, chills, sweats, or radiation below the elbow. Physical exam reveals active forward flexion 160 degrees, 4+ out of 5 strength, external rotation 45 degrees, 4+ out of 5 strength, internal rotation T10 5 out of 5 strength on belly press test, positive deltoid firing, positive Cuenca, Neer's, positive empty can test, negative drop arm test, negative external rotation and lag sign. Over-the-counter anti-inflammatory medications were recommended as needed with usual NSAID precautions. Shoulder range of motion and resisted activity exercises were advised. He elected to proceed with a repeat injection today due to prior success.    2. Right knee pain:  Increasing pain with upright  activities, prolonged walking, and deep flexion activities, localized to the anterior medial aspect of the right knee, with moderate swelling that waxes and wanes. Occasional sharp pain over the medial joint line is noted without any seb locking or catching. Physical exam reveals active range of motion 3 to 120 degrees, 4+ out of 5 strength in flexion and extension, moderate effusion, maximal tenderness to palpation at the medial joint line and medial and lateral patellar facets, stable to varus and valgus stress at three and 30 degrees. Over-the-counter anti-inflammatory medications were recommended as needed with usual NSAID precautions. Hip core and quad strengthening exercises were advised. He elected to proceed with a repeat injection today due to prior success.    Follow-up: The patient will follow up in 3 months for reassessment or sooner if needed.    PROCEDURE    Patient would like to proceed with cortisone injection today to the right shoulder and right knee. Recommended limited use of affected extremity for the next 24 hours to only essential activites other than work on general active and passive motion. Recommended supplementing with ice and soft tissue massage. Discussed with patient that they should see results in 5-7 days, if no improvement in 5-6 weeks I have asked them to call the office to review other options. Patient should call office immediately if they notice redness, warmth, fevers, chills, or residual numbness or tingling for greater than 6 hours after injection.       Repeat injections to both right shoulder and right knee were performed today.      - Large Joint Arthrocentesis: R knee on 7/30/2025 10:43 AM  Indications: pain  Details: 22 G needle, anterolateral approach  Medications: 80 mg triamcinolone acetonide 40 MG/ML; 8 mL lidocaine PF 1% 1 %  Outcome: tolerated well, no immediate complications  Procedure, treatment alternatives, risks and benefits explained, specific risks  discussed. Immediately prior to procedure a time out was called to verify the correct patient, procedure, equipment, support staff and site/side marked as required. Patient was prepped and draped in the usual sterile fashion.       - Large Joint Arthrocentesis: R subacromial bursa on 7/30/2025 10:45 AM  Indications: pain  Details: 22 G needle, lateral approach  Medications: 80 mg triamcinolone acetonide 40 MG/ML; 4 mL lidocaine PF 1% 1 %  Outcome: tolerated well, no immediate complications  Procedure, treatment alternatives, risks and benefits explained, specific risks discussed. Consent was given by the patient. Immediately prior to procedure a time out was called to verify the correct patient, procedure, equipment, support staff and site/side marked as required. Patient was prepped and draped in the usual sterile fashion.         Mason Angel was in agreement with plan and had all questions answered.     Orders:  Orders Placed This Encounter   Procedures    - Large Joint Arthrocentesis: R knee    - Large Joint Arthrocentesis: R subacromial bursa    XR Shoulder 2+ View Right       Medications:  No orders of the defined types were placed in this encounter.      Followup:  Return in about 3 months (around 10/30/2025).    Diagnoses and all orders for this visit:    1. Primary osteoarthritis of right shoulder (Primary)  -     XR Shoulder 2+ View Right    2. Chondromalacia of knee, right    3. Primary osteoarthritis of right knee    Other orders  -     - Large Joint Arthrocentesis: R knee  -     - Large Joint Arthrocentesis: R subacromial bursa        BMI is within normal parameters. No other follow-up for BMI required.          Dictated utilizing Dragon dictation     Patient or patient representative verbalized consent for the use of Ambient Listening during the visit with  Shalom Montaño MD for chart documentation. 7/30/2025  10:26 EDT

## (undated) DEVICE — WEREWOLF FLOW 90 COBLATION WAND: Brand: COBLATION

## (undated) DEVICE — 3M™ MEDIPORE™ H SOFT CLOTH SURGICAL TAPE 2864, 4 INCH X 10 YARD (10CM X 9,14M), 12 ROLLS/CASE: Brand: 3M™ MEDIPORE™

## (undated) DEVICE — CANNULA THREADED FLEX 8.0 X 72MM: Brand: CLEAR-TRAC

## (undated) DEVICE — SPNG GZ WOVN 4X4IN 12PLY 10/BX STRL

## (undated) DEVICE — 1010 S-DRAPE TOWEL DRAPE 10/BX: Brand: STERI-DRAPE™

## (undated) DEVICE — 3M™ STERI-STRIP™ REINFORCED ADHESIVE SKIN CLOSURES, R1547, 1/2 IN X 4 IN (12 MM X 100 MM), 6 STRIPS/ENVELOPE: Brand: 3M™ STERI-STRIP™

## (undated) DEVICE — T-MAX DISPOSABLE FACE MASK 8 PER BOX

## (undated) DEVICE — SHOULDER STABILIZATION KIT,                                    DISPOSABLE 12 PER BOX

## (undated) DEVICE — APPL CHLORAPREP HI/LITE 26ML ORNG

## (undated) DEVICE — SOL ISO/ALC RUB 70PCT 4OZ

## (undated) DEVICE — GLV SURG SENSICARE PI PF LF 7 GRN STRL

## (undated) DEVICE — DECANTER: Brand: UNBRANDED

## (undated) DEVICE — TOWEL,OR,DSP,ST,BLUE,STD,4/PK,20PK/CS: Brand: MEDLINE

## (undated) DEVICE — PK SHLDR ARTHSCP 90

## (undated) DEVICE — CANNULA THREADED FLEX 5.5 X 72MM: Brand: CLEAR-TRAC

## (undated) DEVICE — SUT PDS 0 CT1 36IN Z346H

## (undated) DEVICE — 4.5 FULL RADIUS BONECUTTER BLADES,                                    YELLOW, 8000 MAXIMUM RPM, PACKAGED 6                                    PER BOX, STERILE

## (undated) DEVICE — TRAP FLD MINIVAC MEGADYNE 100ML

## (undated) DEVICE — PENCL E/S ULTRAVAC TELESCP NOSE HOLSTR 10FT

## (undated) DEVICE — GLV SURG SENSICARE PI LF PF 7.0

## (undated) DEVICE — SUT ETHLN 3/0 PS2 18 IN 1669H

## (undated) DEVICE — CLEAR-TRAC DISPOSABLE STOPCOCK: Brand: CLEAR-TRAC

## (undated) DEVICE — PATIENT RETURN ELECTRODE, SINGLE-USE, CONTACT QUALITY MONITORING, ADULT, WITH 9FT CORD, FOR PATIENTS WEIGING OVER 33LBS. (15KG): Brand: MEGADYNE

## (undated) DEVICE — FIRSTPASS ST SUTURE PASSER, SELF CAPTURE: Brand: FIRSTPASS

## (undated) DEVICE — DECANT BG O JET

## (undated) DEVICE — WRAP SHOULDER COLD THERAPY

## (undated) DEVICE — NDL HYPO SFTY GLD 22G 1 1/2IN

## (undated) DEVICE — DRSNG TELFA PAD NONADH STR 1S 3X8IN

## (undated) DEVICE — ST TB GOFLO STRL

## (undated) DEVICE — ADAPT DB SPIKE 2PCT FOR AR6400 TBG

## (undated) DEVICE — 3M™ STERI-DRAPE™ U-DRAPE 1015: Brand: STERI-DRAPE™

## (undated) DEVICE — SYR LUERLOK 20CC BX/50

## (undated) DEVICE — DRSNG SURESITE WNDW 4X4.5

## (undated) DEVICE — SYR LUERLOK 50ML

## (undated) DEVICE — DRSNG GZ PETROLTM XEROFORM CURAD 1X8IN STRL

## (undated) DEVICE — 4.0 MM SHORT SHEATH AND OBTURATOR                                    HANDLE, POWER/EP-1, MAROON, PACKAGED                                    6 PER BOX, STERILE: Brand: DYONICS

## (undated) DEVICE — GLV SURG NEOPRN SENSICARE SZ8

## (undated) DEVICE — TBG PENCL TELESCP MEGADYNE SMOKE EVAC 10FT

## (undated) DEVICE — GLV SURG NEOLON 2G PF LF 7.5 STRL

## (undated) DEVICE — Q-FIX REUSABLE 2.8MM PATHFINDER OBTURATOR: Brand: Q-FIX

## (undated) DEVICE — 4.0 MM ACROMIONIZER STRAIGHT                                    BURRS, POWER/EP-1, MAUVE, 8000                                    MAXIMUM RPM, PACKAGED 6 PER BOX, STERILE: Brand: DYONICS